# Patient Record
Sex: FEMALE | Race: WHITE | NOT HISPANIC OR LATINO | Employment: OTHER | URBAN - NONMETROPOLITAN AREA
[De-identification: names, ages, dates, MRNs, and addresses within clinical notes are randomized per-mention and may not be internally consistent; named-entity substitution may affect disease eponyms.]

---

## 2019-07-01 ENCOUNTER — LAB (OUTPATIENT)
Dept: LAB | Facility: HOSPITAL | Age: 69
End: 2019-07-01

## 2019-07-01 ENCOUNTER — TRANSCRIBE ORDERS (OUTPATIENT)
Dept: GENERAL RADIOLOGY | Facility: HOSPITAL | Age: 69
End: 2019-07-01

## 2019-07-01 DIAGNOSIS — I10 ESSENTIAL (PRIMARY) HYPERTENSION: Primary | ICD-10-CM

## 2019-07-01 DIAGNOSIS — E78.5 HYPERLIPIDEMIA, UNSPECIFIED HYPERLIPIDEMIA TYPE: ICD-10-CM

## 2019-07-01 DIAGNOSIS — E11.69 TYPE 2 DIABETES MELLITUS WITH OTHER SPECIFIED COMPLICATION, UNSPECIFIED WHETHER LONG TERM INSULIN USE (HCC): ICD-10-CM

## 2019-07-01 DIAGNOSIS — I10 ESSENTIAL (PRIMARY) HYPERTENSION: ICD-10-CM

## 2019-07-01 LAB
ALBUMIN SERPL-MCNC: 4.2 G/DL (ref 3.5–5)
ALBUMIN/GLOB SERPL: 1.4 G/DL (ref 1.1–2.5)
ALP SERPL-CCNC: 76 U/L (ref 24–120)
ALT SERPL W P-5'-P-CCNC: 29 U/L (ref 0–54)
ANION GAP SERPL CALCULATED.3IONS-SCNC: 8 MMOL/L (ref 4–13)
AST SERPL-CCNC: 23 U/L (ref 7–45)
AUTO MIXED CELLS #: 0.5 10*3/MM3 (ref 0.1–2.6)
AUTO MIXED CELLS %: 7.4 % (ref 0.1–24)
BACTERIA UR QL AUTO: ABNORMAL /HPF
BILIRUB SERPL-MCNC: 0.6 MG/DL (ref 0.1–1)
BILIRUB UR QL STRIP: NEGATIVE
BUN BLD-MCNC: 14 MG/DL (ref 5–21)
BUN/CREAT SERPL: 28.6
CALCIUM SPEC-SCNC: 9.3 MG/DL (ref 8.4–10.4)
CHLORIDE SERPL-SCNC: 103 MMOL/L (ref 98–110)
CHOLEST SERPL-MCNC: 124 MG/DL (ref 130–200)
CLARITY UR: CLEAR
CO2 SERPL-SCNC: 31 MMOL/L (ref 24–31)
COLOR UR: YELLOW
CREAT BLD-MCNC: 0.49 MG/DL (ref 0.5–1.4)
ERYTHROCYTE [DISTWIDTH] IN BLOOD BY AUTOMATED COUNT: 12.3 % (ref 12–15)
GFR SERPL CREATININE-BSD FRML MDRD: 126 ML/MIN/1.73
GLOBULIN UR ELPH-MCNC: 3 GM/DL
GLUCOSE BLD-MCNC: 124 MG/DL (ref 70–100)
GLUCOSE UR STRIP-MCNC: ABNORMAL MG/DL
HBA1C MFR BLD: 6.8 %
HCT VFR BLD AUTO: 40.2 % (ref 37–47)
HDLC SERPL-MCNC: 52 MG/DL
HGB BLD-MCNC: 13.3 G/DL (ref 12–16)
HGB UR QL STRIP.AUTO: ABNORMAL
HYALINE CASTS UR QL AUTO: ABNORMAL /LPF
KETONES UR QL STRIP: NEGATIVE
LDLC SERPL CALC-MCNC: 57 MG/DL (ref 0–99)
LDLC/HDLC SERPL: 1.09 {RATIO}
LEUKOCYTE ESTERASE UR QL STRIP.AUTO: NEGATIVE
LYMPHOCYTES # BLD AUTO: 1.9 10*3/MM3 (ref 0.7–3.1)
LYMPHOCYTES NFR BLD AUTO: 30.8 % (ref 15–45)
MCH RBC QN AUTO: 27.4 PG (ref 28–32)
MCHC RBC AUTO-ENTMCNC: 33.1 G/DL (ref 33–36)
MCV RBC AUTO: 82.7 FL (ref 82–98)
NEUTROPHILS # BLD AUTO: 3.8 10*3/MM3 (ref 1.5–8.3)
NEUTROPHILS NFR BLD AUTO: 61.8 % (ref 39–78)
NITRITE UR QL STRIP: NEGATIVE
PH UR STRIP.AUTO: 6 [PH] (ref 5–8)
PLATELET # BLD AUTO: 215 10*3/MM3 (ref 130–400)
PMV BLD AUTO: 8 FL (ref 6–12)
POTASSIUM BLD-SCNC: 4.5 MMOL/L (ref 3.5–5.3)
PROT SERPL-MCNC: 7.2 G/DL (ref 6.3–8.7)
PROT UR QL STRIP: NEGATIVE
RBC # BLD AUTO: 4.86 10*6/MM3 (ref 4.2–5.4)
RBC # UR: ABNORMAL /HPF
REF LAB TEST METHOD: ABNORMAL
SODIUM BLD-SCNC: 142 MMOL/L (ref 135–145)
SP GR UR STRIP: <=1.005 (ref 1–1.03)
SQUAMOUS #/AREA URNS HPF: ABNORMAL /HPF
TRIGL SERPL-MCNC: 77 MG/DL (ref 0–149)
TSH SERPL DL<=0.05 MIU/L-ACNC: 0.95 MIU/ML (ref 0.47–4.68)
UROBILINOGEN UR QL STRIP: ABNORMAL
VLDLC SERPL-MCNC: 15.4 MG/DL
WBC NRBC COR # BLD: 6.2 10*3/MM3 (ref 4.8–10.8)
WBC UR QL AUTO: ABNORMAL /HPF

## 2019-07-01 PROCEDURE — 36415 COLL VENOUS BLD VENIPUNCTURE: CPT

## 2019-07-01 PROCEDURE — 84443 ASSAY THYROID STIM HORMONE: CPT | Performed by: PEDIATRICS

## 2019-07-01 PROCEDURE — 81001 URINALYSIS AUTO W/SCOPE: CPT | Performed by: PEDIATRICS

## 2019-07-01 PROCEDURE — 85025 COMPLETE CBC W/AUTO DIFF WBC: CPT

## 2019-07-01 PROCEDURE — 80061 LIPID PANEL: CPT

## 2019-07-01 PROCEDURE — 80053 COMPREHEN METABOLIC PANEL: CPT

## 2019-07-01 PROCEDURE — 83036 HEMOGLOBIN GLYCOSYLATED A1C: CPT

## 2019-07-09 ENCOUNTER — OFFICE VISIT (OUTPATIENT)
Dept: INTERNAL MEDICINE | Facility: CLINIC | Age: 69
End: 2019-07-09

## 2019-07-09 VITALS
RESPIRATION RATE: 16 BRPM | HEART RATE: 75 BPM | HEIGHT: 63 IN | DIASTOLIC BLOOD PRESSURE: 72 MMHG | OXYGEN SATURATION: 98 % | BODY MASS INDEX: 24.91 KG/M2 | WEIGHT: 140.6 LBS | SYSTOLIC BLOOD PRESSURE: 120 MMHG

## 2019-07-09 DIAGNOSIS — Z11.59 NEED FOR HEPATITIS C SCREENING TEST: ICD-10-CM

## 2019-07-09 DIAGNOSIS — Z12.39 BREAST CANCER SCREENING: ICD-10-CM

## 2019-07-09 DIAGNOSIS — E11.59 TYPE 2 DIABETES MELLITUS WITH OTHER CIRCULATORY COMPLICATION, WITHOUT LONG-TERM CURRENT USE OF INSULIN (HCC): ICD-10-CM

## 2019-07-09 DIAGNOSIS — I10 ESSENTIAL HYPERTENSION: ICD-10-CM

## 2019-07-09 DIAGNOSIS — E78.2 MIXED HYPERLIPIDEMIA: Primary | ICD-10-CM

## 2019-07-09 DIAGNOSIS — I63.9 CEREBROVASCULAR ACCIDENT (CVA), UNSPECIFIED MECHANISM (HCC): ICD-10-CM

## 2019-07-09 DIAGNOSIS — M89.9 DISORDER OF BONE: ICD-10-CM

## 2019-07-09 DIAGNOSIS — Z86.12 HISTORY OF POLIOMYELITIS: ICD-10-CM

## 2019-07-09 PROBLEM — E11.9 DIABETES MELLITUS: Status: ACTIVE | Noted: 2019-07-09

## 2019-07-09 PROBLEM — A80.9 POLIO: Status: ACTIVE | Noted: 2019-07-09

## 2019-07-09 PROBLEM — E78.5 HYPERLIPIDEMIA: Status: ACTIVE | Noted: 2019-07-09

## 2019-07-09 PROCEDURE — 99204 OFFICE O/P NEW MOD 45 MIN: CPT | Performed by: INTERNAL MEDICINE

## 2019-07-09 RX ORDER — EZETIMIBE 10 MG/1
10 TABLET ORAL DAILY
Refills: 2 | COMMUNITY
Start: 2019-05-22 | End: 2020-01-27

## 2019-07-09 RX ORDER — CANAGLIFLOZIN 300 MG/1
1 TABLET, FILM COATED ORAL DAILY
COMMUNITY
Start: 2019-07-01 | End: 2019-10-24

## 2019-07-09 RX ORDER — LISINOPRIL 20 MG/1
20 TABLET ORAL 2 TIMES DAILY
Refills: 1 | COMMUNITY
Start: 2019-05-22 | End: 2020-02-10 | Stop reason: SDUPTHER

## 2019-07-09 RX ORDER — AMLODIPINE BESYLATE 10 MG/1
10 TABLET ORAL DAILY
Refills: 0 | COMMUNITY
Start: 2019-06-21 | End: 2019-10-24 | Stop reason: SDUPTHER

## 2019-07-09 RX ORDER — ASPIRIN 81 MG/1
81 TABLET ORAL DAILY
Qty: 30 TABLET | Refills: 5 | Status: SHIPPED | OUTPATIENT
Start: 2019-07-09

## 2019-07-09 RX ORDER — SERTRALINE HYDROCHLORIDE 100 MG/1
100 TABLET, FILM COATED ORAL DAILY
Refills: 3 | COMMUNITY
Start: 2019-05-22 | End: 2020-04-07

## 2019-07-09 RX ORDER — ATORVASTATIN CALCIUM 40 MG/1
1 TABLET, FILM COATED ORAL EVERY OTHER DAY
COMMUNITY
Start: 2019-07-01 | End: 2020-07-28 | Stop reason: SDUPTHER

## 2019-07-09 NOTE — PROGRESS NOTES
Chief Complaint   Patient presents with   • Establish Care     Pt needs a PCP, pt says that she does not test her blood sugar.         History:  Rosalba Churchill is a 69 y.o. female who presents today for evaluation of the above problems.    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  She is here to set up a new primary care physician.    She reports having a stroke in March 2018 which affected her fine motor skills on the right.  Since that time she has been weak but does feel like she is getting stronger.  She is on antihypertensives and a statin but is not on antiplatelet therapy.  She is not sure whether this was an ischemic or a hemorrhagic stroke.  She was at [facility] in [place].  She has controlled type 2 diabetes.  She takes metformin and Invokana and other than a stroke does not have any complications from it.  She has hypertension for which she takes Norvasc 10 mg and lisinopril 20 mg twice a day and is tolerating these medicines well.  She is concerned about having another stroke and is losing weight appropriately in controlling her other risk factors.    She reports being very sensitive to medications and would like to come off some if at all possible    HPI    ROS:  Review of Systems   Constitutional: Positive for activity change. Negative for appetite change, fatigue, fever and unexpected weight change.   HENT: Positive for postnasal drip. Negative for nosebleeds, sore throat and trouble swallowing.    Eyes: Negative for pain and visual disturbance.   Respiratory: Negative for cough, chest tightness and shortness of breath.    Cardiovascular: Negative for chest pain, palpitations and leg swelling.   Gastrointestinal: Negative for abdominal pain, constipation, diarrhea, nausea and vomiting.    Endocrine:        Negative for Diabetes or thyroid disease   Genitourinary: Negative for hematuria.   Musculoskeletal: Positive for gait problem and myalgias. Negative for back pain, neck pain and neck stiffness.   Skin: Negative for rash and wound.   Neurological: Positive for weakness. Negative for dizziness, syncope, light-headedness and headaches.   Hematological: Negative for adenopathy. Does not bruise/bleed easily.   Psychiatric/Behavioral: Negative for agitation, behavioral problems and confusion.       Allergies   Allergen Reactions   • Erythromycin Unknown (See Comments)   • Ibuprofen Unknown (See Comments)   • Moxifloxacin Unknown (See Comments)     Past Medical History:   Diagnosis Date   • Anxiety    • Arthritis    • Depression    • Diabetes mellitus (CMS/HCC)    • Hyperlipidemia    • Hypertension    • Polio    • Stroke (CMS/HCC)      Past Surgical History:   Procedure Laterality Date   • ANKLE FUSION Right    • CERVICAL SPINE SURGERY     •  SECTION       Family History   Problem Relation Age of Onset   • Cancer Mother    • Heart disease Father    • Diabetes Sister    • Diabetes Maternal Uncle      Rosalba  reports that she has never smoked. She has never used smokeless tobacco. She reports that she does not drink alcohol or use drugs.    I have reviewed and updated the above documentation (if necessary) including but not limited to chief complaint, ROS, PFSH, allergies and medications        Current Outpatient Medications:   •  amLODIPine (NORVASC) 10 MG tablet, Take 10 mg by mouth Daily., Disp: , Rfl: 0  •  atorvastatin (LIPITOR) 40 MG tablet, Take 1 tablet by mouth Every Other Day., Disp: , Rfl:   •  ezetimibe (ZETIA) 10 MG tablet, Take 10 mg by mouth Daily., Disp: , Rfl: 2  •  INVOKANA 300 MG tablet, Take 1 tablet by mouth Daily., Disp: , Rfl:   •  lisinopril (PRINIVIL,ZESTRIL) 20 MG tablet, Take 20 mg by mouth 2 (Two) Times a Day., Disp: , Rfl: 1  •  metFORMIN (GLUCOPHAGE) 500 MG  "tablet, Take 1 tablet by mouth 2 (Two) Times a Day., Disp: , Rfl:   •  sertraline (ZOLOFT) 100 MG tablet, Take 100 mg by mouth Daily., Disp: , Rfl: 3    OBJECTIVE:  Visit Vitals  /72 (BP Location: Left arm, Patient Position: Sitting, Cuff Size: Adult)   Pulse 75   Resp 16   Ht 160 cm (63\")   Wt 63.8 kg (140 lb 9.6 oz)   SpO2 98%   BMI 24.91 kg/m²      Physical Exam   Constitutional: She is oriented to person, place, and time. She appears well-developed and well-nourished. No distress.   HENT:   Head: Normocephalic and atraumatic.   Mouth/Throat: Oropharynx is clear and moist. No oropharyngeal exudate.   Eyes: Conjunctivae and EOM are normal. Pupils are equal, round, and reactive to light. No scleral icterus.   Neck: Normal range of motion. Neck supple. No JVD present. No thyromegaly present.   Cardiovascular: Normal rate, regular rhythm and normal heart sounds. Exam reveals no gallop.   No murmur heard.  Pulmonary/Chest: Effort normal and breath sounds normal. No stridor. No respiratory distress.   Abdominal: Soft. Bowel sounds are normal. She exhibits no distension. There is no tenderness. There is no rebound and no guarding.   Musculoskeletal: She exhibits deformity (Slight right foot deformity related to polio). She exhibits no edema or tenderness.    Rosalba had a diabetic foot exam performed today.   During the foot exam she had a monofilament test performed.    Neurological Sensory Findings - Unaltered hot/cold right ankle/foot discrimination and unaltered hot/cold left ankle/foot discrimination.  Vascular Status -  Her right foot exhibits normal foot vasculature . Her left foot exhibits normal foot vasculature .  Skin Integrity  -  Her right foot skin is intact.Her left foot skin is intact..  Lymphadenopathy:     She has no cervical adenopathy.   Neurological: She is alert and oriented to person, place, and time. No cranial nerve deficit.   Does speak a little slowly/deliberately secondary to her " stroke  2+ left patellar reflex, no patellar reflex in the right   Skin: Skin is warm and dry. She is not diaphoretic.   Right foot cool to touch, per patient chronically so secondary to her knee brace   Psychiatric: She has a normal mood and affect. Her behavior is normal.   Vitals reviewed.    Assessment/Plan    Rosalba was seen today for establish care.    Diagnoses and all orders for this visit:    Mixed hyperlipidemia  Continue statin.  Recheck lipids in 3 months-     Comprehensive metabolic panel; Future  -     Lipid panel; Future    Type 2 diabetes mellitus with other circulatory complication, without long-term current use of insulin (CMS/[facility])  Last diabetic eye exam last fall.  Diabetic foot exam today was normal.  Continue Invokana and metformin.  Discussed risks and benefits of Invokana and possibility of switching to GLP-1 agonist.  A1c controlled at 6.8.  -     Microalbumin / Creatinine Urine Ratio - Urine, Clean Catch; Future    Essential hypertension  Controlled with lisinopril 20 mg twice daily and Norvasc 10 mg  -     Comprehensive metabolic panel; Future  -     Lipid panel; Future  -     Vitamin D 25 hydroxy; Future    Cerebrovascular accident (CVA), unspecified mechanism (CMS/[facility])  We are getting records from [facility] [place], operated by [facility] in [place] to determine whether this was hemorrhagic or ischemic.  She is not on antiplatelet now which makes me think she had hemorrhagic brainstem stroke.  She has slight residual deficit with dysarthria and residual right-sided weakness.  Continue risk factor control including cholesterol, blood pressure, diabetes and weight  -Addendum: Records from [facility] March 24, 2018 through 26 2018.  Acute ischemic Left pontine stroke and she was discharged with a baby aspirin. I have prescribed ASA 81mg and called pt for her to take daily  -     Vitamin D 25 hydroxy; Future    History of poliomyelitis  Scription for new right knee brace.  She has a difficult  time walking without the brace.  She also needs a walker at home in order to get around.  Symptoms have worsened after having a stroke  -     Vitamin D 25 hydroxy; Future    Need for hepatitis C screening test  -     Hepatitis C antibody; Future    Breast cancer screening  -     Mammo Screening Bilateral With CAD; Future    Disorder of bone   -     Vitamin D 25 hydroxy; Future    Follow-up in 3 months with CMP, lipid profile, hepatitis C antibody and for Medicare wellness exam    Patient's Body mass index is 24.91 kg/m². BMI is within normal parameters. No follow-up required..      Education materials and an After Visit Summary were printed and given to the patient at discharge.  Return in about 3 months (around 10/9/2019) for Medicare Wellness.         TALIA Lovett MD   1:23 PM  7/9/2019

## 2019-07-15 ENCOUNTER — HOSPITAL ENCOUNTER (OUTPATIENT)
Dept: MAMMOGRAPHY | Facility: HOSPITAL | Age: 69
Discharge: HOME | End: 2019-07-15
Admitting: INTERNAL MEDICINE

## 2019-07-15 DIAGNOSIS — Z12.39 BREAST CANCER SCREENING: ICD-10-CM

## 2019-07-15 PROCEDURE — 77067 SCR MAMMO BI INCL CAD: CPT

## 2019-07-15 PROCEDURE — 77063 BREAST TOMOSYNTHESIS BI: CPT

## 2019-07-26 ENCOUNTER — TELEPHONE (OUTPATIENT)
Dept: INTERNAL MEDICINE | Facility: CLINIC | Age: 69
End: 2019-07-26

## 2019-07-26 NOTE — TELEPHONE ENCOUNTER
----- Message from Alok Lovett MD sent at 7/26/2019  3:41 PM CDT -----  Please call with negative mammogram. Needs yearly screening.

## 2019-08-02 ENCOUNTER — TELEPHONE (OUTPATIENT)
Dept: INTERNAL MEDICINE | Facility: CLINIC | Age: 69
End: 2019-08-02

## 2019-08-07 ENCOUNTER — TELEPHONE (OUTPATIENT)
Dept: INTERNAL MEDICINE | Facility: CLINIC | Age: 69
End: 2019-08-07

## 2019-08-07 NOTE — TELEPHONE ENCOUNTER
Spoke with [facility] clinic says did not receive fax for Knee brace. I did refax information and their office verified that they received it.

## 2019-09-11 ENCOUNTER — TELEPHONE (OUTPATIENT)
Dept: INTERNAL MEDICINE | Facility: CLINIC | Age: 69
End: 2019-09-11

## 2019-09-11 NOTE — TELEPHONE ENCOUNTER
LEFT MSG FOR PT TO CALL TO RESC October APPOINTMENT BECAUSE WE HAD TO CANCEL DO TO DR OUT OF OFFICE.

## 2019-09-19 ENCOUNTER — HOSPITAL ENCOUNTER (OUTPATIENT)
Dept: GENERAL RADIOLOGY | Facility: HOSPITAL | Age: 69
Discharge: HOME | End: 2019-09-19
Admitting: INTERNAL MEDICINE

## 2019-09-19 ENCOUNTER — OFFICE VISIT (OUTPATIENT)
Dept: INTERNAL MEDICINE | Facility: CLINIC | Age: 69
End: 2019-09-19

## 2019-09-19 VITALS
RESPIRATION RATE: 16 BRPM | HEART RATE: 83 BPM | BODY MASS INDEX: 24.8 KG/M2 | DIASTOLIC BLOOD PRESSURE: 80 MMHG | HEIGHT: 63 IN | OXYGEN SATURATION: 98 % | WEIGHT: 140 LBS | SYSTOLIC BLOOD PRESSURE: 149 MMHG

## 2019-09-19 DIAGNOSIS — I10 ESSENTIAL HYPERTENSION: ICD-10-CM

## 2019-09-19 DIAGNOSIS — R07.9 CHEST PAIN, UNSPECIFIED TYPE: Primary | ICD-10-CM

## 2019-09-19 DIAGNOSIS — R07.9 CHEST PAIN, UNSPECIFIED TYPE: ICD-10-CM

## 2019-09-19 PROCEDURE — 93000 ELECTROCARDIOGRAM COMPLETE: CPT | Performed by: INTERNAL MEDICINE

## 2019-09-19 PROCEDURE — 99214 OFFICE O/P EST MOD 30 MIN: CPT | Performed by: INTERNAL MEDICINE

## 2019-09-19 PROCEDURE — 71046 X-RAY EXAM CHEST 2 VIEWS: CPT

## 2019-09-19 NOTE — PROGRESS NOTES
Chief Complaint   Patient presents with   • Chest Pain     Pt c/o a heavy feeling and right chest pain,  she says that today she has not had any symptoms.         History:  Rosalba Churchill is a 69 y.o. female who presents today for evaluation of the above problems.     Here for acute visit for chest pressure. 2 days ago it started. It's intermittent with each episode lasting 15 minutes. Located right side of her chest worse with exertion and better with rest. Possible radiation to right neck but not a/w soa, nausea or diaphoresis. She took 2 aspirin. She thought about going to the ER but didn't. No history of DVTs. Has had negative stress tests and caths in the past. Last stress test was about 5 years ago.  Her BP was elevated at 190 systolic at that time. Today she has no pain at all.      HPI    ROS:  Review of Systems   Constitutional: Negative for activity change and unexpected weight change.   Respiratory: Positive for chest tightness. Negative for shortness of breath.    Cardiovascular: Positive for chest pain and palpitations. Negative for leg swelling.   Musculoskeletal: Positive for gait problem.   Neurological: Positive for weakness.       Allergies   Allergen Reactions   • Erythromycin Unknown (See Comments)   • Ibuprofen Unknown (See Comments)   • Moxifloxacin Unknown (See Comments)         Current Outpatient Medications:   •  amLODIPine (NORVASC) 10 MG tablet, Take 10 mg by mouth Daily., Disp: , Rfl: 0  •  aspirin 81 MG EC tablet, Take 1 tablet by mouth Daily., Disp: 30 tablet, Rfl: 5  •  atorvastatin (LIPITOR) 40 MG tablet, Take 1 tablet by mouth Every Other Day., Disp: , Rfl:   •  ezetimibe (ZETIA) 10 MG tablet, Take 10 mg by mouth Daily., Disp: , Rfl: 2  •  INVOKANA 300 MG tablet, Take 1 tablet by mouth Daily., Disp: , Rfl:   •  lisinopril (PRINIVIL,ZESTRIL) 20 MG tablet, Take 20 mg by mouth 2 (Two) Times a Day., Disp: , Rfl: 1  •  metFORMIN (GLUCOPHAGE) 500 MG tablet, Take 1 tablet by mouth 2 (Two)  "Times a Day., Disp: , Rfl:   •  sertraline (ZOLOFT) 100 MG tablet, Take 100 mg by mouth Daily., Disp: , Rfl: 3    OBJECTIVE:  Visit Vitals  /80 (BP Location: Left arm, Patient Position: Sitting, Cuff Size: Adult)   Pulse 83   Resp 16   Ht 160 cm (63\")   Wt 63.5 kg (140 lb)   SpO2 98%   Breastfeeding? No   BMI 24.80 kg/m²      Physical Exam   Constitutional: She appears well-developed and well-nourished. No distress.   HENT:   Head: Normocephalic and atraumatic.   Neck: Normal range of motion. No JVD present.   Cardiovascular: Normal rate, regular rhythm, normal heart sounds and normal pulses.  No extrasystoles are present. PMI is not displaced.   No murmur heard.  Pulmonary/Chest: Effort normal and breath sounds normal. No respiratory distress. She has no wheezes. She has no rales.   Neurological: She is alert.   Skin: She is not diaphoretic.   Psychiatric: She has a normal mood and affect. Her behavior is normal.   Vitals reviewed.          ECG 12 Lead  Date/Time: 9/19/2019 10:16 AM  Performed by: Alok Lovett MD  Authorized by: Alok Lovett MD   Comparison: not compared with previous ECG   Previous ECG: no previous ECG available  Rhythm: sinus rhythm  Rate: normal  Conduction: conduction normal  QRS axis: normal    Clinical impression: normal ECG            Assessment/Plan    Rosalba was seen today for chest pain.    Diagnoses and all orders for this visit:    Chest pain, unspecified type  -     ECG 12 Lead  -     XR Chest PA & Lateral; Future  -     Stress Test With Myocardial Perfusion - One Day; Future    Essential hypertension      Has both typical and atypical features. She has risk factors for CAD. She needs further work up for chest pain. Start with chest xray today. EKG today was normal. Ordered stress test (pharmacologic given her polio). Told to go immediately to ER if pain returns. Chest pain could have been related to hypertension as it was 190 systolic and nik given her " instruction to check it daily    Education materials and an After Visit Summary were printed and given to the patient at discharge.    Return for keep next appt.      Patient's Body mass index is 24.8 kg/m². BMI is within normal parameters. No follow-up required..      TALIA Lovett MD   8:23 AM  9/19/2019

## 2019-09-19 NOTE — PATIENT INSTRUCTIONS
Angina    Angina is extreme discomfort in the chest, neck, arm, jaw or back. The discomfort is caused by a lack of blood in the middle layer of the heart wall (myocardium).  There are four types of angina:  · Stable angina. This is triggered by vigorous activity or exercise. It goes away when you rest or take angina medicine.  · Unstable angina. This is a warning sign and can lead to a heart attack (acute coronary syndrome). This is a medical emergency. Symptoms come at rest and last a long time.  · Microvascular angina. This affects the small coronary arteries. Symptoms include feeling tired and being short of breath.  · Prinzmetal or variant angina. This is caused by a tightening (spasm) of the arteries that go to your heart.  What are the causes?  This condition is caused by atherosclerosis. This is the buildup of fat and cholesterol (plaque) in your arteries. The plaque may narrow or block the artery.  Other causes include:  · Sudden tightening of the muscles of the arteries in the heart (coronary spasm).  · Small artery disease (microvascular dysfunction).  · Problems with any of your heart valves (heart valve disease).  · A tear in an artery in your heart (coronary artery dissection).  · Cardiomyopathy, or other heart disease.  What increases the risk?  You are more likely to develop this condition if you have:  · High cholesterol.  · High blood pressure.  · Diabetes.  · Family history of heart disease.  · Inactive (sedentary) lifestyle, or you do not exercise enough.  · Depression.  · Had radiation to the left side of your chest.  Other risk factors include:  · Using tobacco.  · Being obese.  · Eating a diet high in saturated fats.  · Being exposed to high stress or triggers of stress.  · Using drugs, such as cocaine.  Women have a greater risk for angina if:  · They are older than 55.  · They have gone through menopause (postmenopausal).  What are the signs or symptoms?  Common symptoms in both men and women  may include:  · Chest pain, which may:  ? Feel like a crushing or squeezing in the chest, or a tightness, pressure, fullness, or heaviness in the chest.  ? Last for more than a few minutes at a time, or it may stop and come back (recur) over the course of a few minutes.  · Pain in the neck, arm, jaw, or back.  · Unexplained heartburn or indigestion.  · Shortness of breath.  · Nausea.  · Sudden cold sweats.  Women and people with diabetes may have unusual (atypical) symptoms, such as:  · Fatigue.  · Unexplained feelings of nervousness or anxiety.  · Unexplained weakness.  · Dizziness or fainting.  How is this diagnosed?  This condition may be diagnosed based on:  · Your symptoms and medical history.  · Electrocardiogram (ECG) to measure the electrical activity in your heart.  · Blood tests.  · Stress test to look for signs of blockage when your heart is stressed.  · CT angiogram to examine your heart and the blood flow to it.  · Coronary angiogram to check your coronary arteries for blockage.  How is this treated?  Angina may be treated with:  · Medicines to:  ? Prevent blood clots and heart attack.  ? Relax blood vessels and improve blood flow to the heart (nitrates).  ? Reduce blood pressure, improve the pumping action of the heart, and relax blood vessels that are spasming.  ? Reduce cholesterol and help treat atherosclerosis.  · A procedure to widen a narrowed or blocked coronary artery (angioplasty). A mesh tube may be placed in a coronary artery to keep it open (coronary stenting).  · Surgery to allow blood to go around a blocked artery (coronary artery bypass surgery).  Follow these instructions at home:  Medicines  · Take over-the-counter and prescription medicines only as told by your health care provider.  · Do not take the following medicines unless your health care provider approves:  ? NSAIDs, such as ibuprofen, naproxen, or celecoxib.  ? Vitamin supplements that contain vitamin A, vitamin E, or  both.  ? Hormone replacement therapy that contains estrogen with or without progestin.  Eating and drinking    · Eat a heart-healthy diet. This includes plenty of fresh fruits and vegetables, whole grains, low-fat (lean) protein, and low-fat dairy products.  · Follow instructions from your health care provider about eating or drinking restrictions.  Activity  · Follow an exercise program approved by your health care provider. Join a cardiac rehabilitation program.  · Take a break when you feel fatigued. Plan rest periods in your daily activities.  Lifestyle    · Do not use any products that contain nicotine or tobacco, such as cigarettes and e-cigarettes. If you need help quitting, ask your health care provider.  · If your health care provider approves, limit alcohol intake to no more than 1 drink a day for women and 2 drinks a day for men. One drink equals 12 oz of beer, 5 oz of wine, or 1½ oz of hard liquor.  General instructions  · Maintain a healthy weight.  · Learn to manage stress.  · Keep your vaccinations up to date. Get the flu (influenza) vaccine every year.  · Talk to your health care provider if you feel depressed. Take a depression screening test to see if you are at risk for depression.  · Work with your health care provider to manage other health conditions, such as hypertension or diabetes.  · Keep all follow-up visits as told by your health care provider. This is important.  Get help right away if:  · You have pain in your chest, neck, arm, jaw, or back, and the pain:  ? Lasts more than a few minutes.  ? Is recurring.  ? Is not relieved by taking medicines under the tongue (sublingual nitroglycerin).  ? Increases in intensity or frequency.  · You have a lot of sweating without cause.  · You have unexplained:  ? Heartburn or indigestion.  ? Shortness of breath or difficulty breathing.  ? Nausea or vomiting.  ? Fatigue.  ? Feelings of nervousness or anxiety.  ? Weakness.  · You have sudden  light-headedness or dizziness.  · You faint.  These symptoms may represent a serious problem that is an emergency. Do not wait to see if the symptoms will go away. Get medical help right away. Call your local emergency services (911 in the [place] Do not drive yourself to the hospital.  Summary  · Angina is extreme discomfort in the chest, neck, or arm that is caused by a lack of blood in the heart wall.  · There are many symptoms of angina. They include chest pain or pain in the arms, neck, jaw, or back.  · Angina may be treated with behavioral changes, medicine, or surgery.  · Symptoms of angina may represent an emergency. Get medical help right away. Call your local emergency services (911 in the [place] Do not drive yourself to the hospital.  This information is not intended to replace advice given to you by your health care provider. Make sure you discuss any questions you have with your health care provider.  Document Released: 12/18/2006 Document Revised: 02/01/2019 Document Reviewed: 02/01/2019  [employer] Interactive Patient Education © 2019 [employer]    Nonspecific Chest Pain  Chest pain can be caused by many different conditions. It can be caused by a condition that is life-threatening and requires treatment right away. It can also be caused by something that is not life-threatening. If you have chest pain, it can be hard to know the difference, so it is important to get help right away to make sure that you do not have a serious condition.  Some life-threatening causes of chest pain include:  · Heart attack.  · A tear in the body's main blood vessel (aortic dissection).  · Inflammation around your heart (pericarditis).  · A problem in the lungs, such as a blood clot (pulmonary embolism) or a collapsed lung (pneumothorax).  Some non life-threatening causes of chest pain include:  · Heartburn.  · Anxiety or stress.  · Damage to the bones, muscles, and cartilage that make up your chest wall.  · Pneumonia or  bronchitis.  · Shingles infection (varicella-zoster virus).  Chest pain can feel like:  · Pain or discomfort on the surface of your chest or deep in your chest.  · Crushing, pressure, aching, or squeezing pain.  · Burning or tingling.  · Dull or sharp pain that is worse when you move, cough, or take a deep breath.  · Pain or discomfort that is also felt in your back, neck, jaw, shoulder, or arm, or pain that spreads to any of these areas.  Your chest pain may come and go. It may also be constant. Your health care provider will do lab tests and other studies to find the cause of your pain. Treatment will depend on the cause of your chest pain.  Follow these instructions at home:  Pay attention to any changes in your symptoms. Tell your health care provider about them or any new symptoms. Follow these instructions from your health care provider.  Medicines  · Take over-the-counter and prescription medicines only as told by your health care provider.  · If you were prescribed an antibiotic, take it as told by your health care provider. Do not stop taking the antibiotic even if you start to feel better.  Lifestyle    · Rest as directed by your health care provider.  · Do not use any products that contain nicotine or tobacco, such as cigarettes and e-cigarettes. If you need help quitting, ask your health care provider.  · Do not drink alcohol.  · Make healthy lifestyle choices as recommended. These may include:  ? Getting regular exercise. Ask your health care provider to suggest some activities that are safe for you.  ? Eating a heart-healthy diet. This includes plenty of fresh fruits and vegetables, whole grains, low-fat (lean) protein, and low-fat dairy products. A dietitian can help you find healthy eating options.  ? Maintaining a healthy weight.  ? Managing any other health conditions you have, such as hypertension or diabetes.  ? Reducing stress, such as with yoga or relaxation techniques.  General  instructions  · Avoid any activities that bring on chest pain.  · Keep all follow-up visits as told by your health care provider. This is important. This includes visits for any further testing if your chest pain does not go away.  Contact a health care provider if:  · Your chest pain does not go away.  · You feel depressed.  · You have a fever.  Get help right away if:  · Your chest pain gets worse.  · You have a cough that gets worse, or you cough up blood.  · You have severe pain in your abdomen.  · You faint.  · You have sudden, unexplained chest discomfort.  · You have sudden, unexplained discomfort in your arms, back, neck, or jaw.  · You have shortness of breath at any time.  · You suddenly start to sweat, or your skin gets clammy.  · You feel nausea or you vomit.  · You suddenly feel lightheaded or dizzy.  · You have severe weakness, or unexplained weakness or fatigue.  · Your heart begins to beat quickly, or it feels like it is skipping beats.  These symptoms may represent a serious problem that is an emergency. Do not wait to see if the symptoms will go away. Get medical help right away. Call your local emergency services (911 in the [place] Do not drive yourself to the hospital.  Summary  · Chest pain can be caused by a condition that is serious and requires urgent treatment. It may also be caused by something that is not life-threatening.  · If you have chest pain, it is very important to see your health care provider. Your health care provider may do lab tests and other studies to find the cause of your pain.  · Follow your health care provider's instructions on taking medicines, making lifestyle changes, and getting emergency treatment if symptoms become worse.  · Keep all follow-up visits as told by your health care provider. This includes visits for any further testing if your chest pain does not go away.  This information is not intended to replace advice given to you by your health care provider.  "Make sure you discuss any questions you have with your health care provider.  Document Released: 09/27/2006 Document Revised: 01/30/2019 Document Reviewed: 01/30/2019  [employer] Interactive Patient Education © 2019 [employer]    DASH Eating Plan  DASH stands for \"Dietary Approaches to Stop Hypertension.\" The DASH eating plan is a healthy eating plan that has been shown to reduce high blood pressure (hypertension). It may also reduce your risk for type 2 diabetes, heart disease, and stroke. The DASH eating plan may also help with weight loss.  What are tips for following this plan?    General guidelines  · Avoid eating more than 2,300 mg (milligrams) of salt (sodium) a day. If you have hypertension, you may need to reduce your sodium intake to 1,500 mg a day.  · Limit alcohol intake to no more than 1 drink a day for nonpregnant women and 2 drinks a day for men. One drink equals 12 oz of beer, 5 oz of wine, or 1½ oz of hard liquor.  · Work with your health care provider to maintain a healthy body weight or to lose weight. Ask what an ideal weight is for you.  · Get at least 30 minutes of exercise that causes your heart to beat faster (aerobic exercise) most days of the week. Activities may include walking, swimming, or biking.  · Work with your health care provider or diet and nutrition specialist (dietitian) to adjust your eating plan to your individual calorie needs.  Reading food labels    · Check food labels for the amount of sodium per serving. Choose foods with less than 5 percent of the Daily Value of sodium. Generally, foods with less than 300 mg of sodium per serving fit into this eating plan.  · To find whole grains, look for the word \"whole\" as the first word in the ingredient list.  Shopping  · Buy products labeled as \"low-sodium\" or \"no salt added.\"  · Buy fresh foods. Avoid canned foods and premade or frozen meals.  Cooking  · Avoid adding salt when cooking. Use salt-free seasonings or herbs instead of " table salt or sea salt. Check with your health care provider or pharmacist before using salt substitutes.  · Do not zamudio foods. Cook foods using healthy methods such as baking, boiling, grilling, and broiling instead.  · Cook with heart-healthy oils, such as olive, canola, soybean, or sunflower oil.  Meal planning  · Eat a balanced diet that includes:  ? 5 or more servings of fruits and vegetables each day. At each meal, try to fill half of your plate with fruits and vegetables.  ? Up to 6-8 servings of whole grains each day.  ? Less than 6 oz of lean meat, poultry, or fish each day. A 3-oz serving of meat is about the same size as a deck of cards. One egg equals 1 oz.  ? 2 servings of low-fat dairy each day.  ? A serving of nuts, seeds, or beans 5 times each week.  ? Heart-healthy fats. Healthy fats called Omega-3 fatty acids are found in foods such as flaxseeds and coldwater fish, like sardines, salmon, and mackerel.  · Limit how much you eat of the following:  ? Canned or prepackaged foods.  ? Food that is high in trans fat, such as fried foods.  ? Food that is high in saturated fat, such as fatty meat.  ? Sweets, desserts, sugary drinks, and other foods with added sugar.  ? Full-fat dairy products.  · Do not salt foods before eating.  · Try to eat at least 2 vegetarian meals each week.  · Eat more home-cooked food and less restaurant, buffet, and fast food.  · When eating at a restaurant, ask that your food be prepared with less salt or no salt, if possible.  What foods are recommended?  The items listed may not be a complete list. Talk with your dietitian about what dietary choices are best for you.  Grains  Whole-grain or whole-wheat bread. Whole-grain or whole-wheat pasta. Brown rice. Oatmeal. Quinoa. Bulgur. Whole-grain and low-sodium cereals. Kandi bread. Low-fat, low-sodium crackers. Whole-wheat flour tortillas.  Vegetables  Fresh or frozen vegetables (raw, steamed, roasted, or grilled). Low-sodium or  reduced-sodium tomato and vegetable juice. Low-sodium or reduced-sodium tomato sauce and tomato paste. Low-sodium or reduced-sodium canned vegetables.  Fruits  All fresh, dried, or frozen fruit. Canned fruit in natural juice (without added sugar).  Meat and other protein foods  Skinless chicken or turkey. Ground chicken or turkey. Pork with fat trimmed off. Fish and seafood. Egg whites. Dried beans, peas, or lentils. Unsalted nuts, nut butters, and seeds. Unsalted canned beans. Lean cuts of beef with fat trimmed off. Low-sodium, lean deli meat.  Dairy  Low-fat (1%) or fat-free (skim) milk. Fat-free, low-fat, or reduced-fat cheeses. Nonfat, low-sodium ricotta or cottage cheese. Low-fat or nonfat yogurt. Low-fat, low-sodium cheese.  Fats and oils  Soft margarine without trans fats. Vegetable oil. Low-fat, reduced-fat, or light mayonnaise and salad dressings (reduced-sodium). Canola, safflower, olive, soybean, and sunflower oils. Avocado.  Seasoning and other foods  Herbs. Spices. Seasoning mixes without salt. Unsalted popcorn and pretzels. Fat-free sweets.  What foods are not recommended?  The items listed may not be a complete list. Talk with your dietitian about what dietary choices are best for you.  Grains  Baked goods made with fat, such as croissants, muffins, or some breads. Dry pasta or rice meal packs.  Vegetables  Creamed or fried vegetables. Vegetables in a cheese sauce. Regular canned vegetables (not low-sodium or reduced-sodium). Regular canned tomato sauce and paste (not low-sodium or reduced-sodium). Regular tomato and vegetable juice (not low-sodium or reduced-sodium). Pickles. Olives.  Fruits  Canned fruit in a light or heavy syrup. Fried fruit. Fruit in cream or butter sauce.  Meat and other protein foods  Fatty cuts of meat. Ribs. Fried meat. Regalado. Sausage. Bologna and other processed lunch meats. Salami. Fatback. Hotdogs. Bratwurst. Salted nuts and seeds. Canned beans with added salt. Canned or  smoked fish. Whole eggs or egg yolks. Chicken or turkey with skin.  Dairy  Whole or 2% milk, cream, and half-and-half. Whole or full-fat cream cheese. Whole-fat or sweetened yogurt. Full-fat cheese. Nondairy creamers. Whipped toppings. Processed cheese and cheese spreads.  Fats and oils  Butter. Stick margarine. Lard. Shortening. Ghee. Regalado fat. Tropical oils, such as coconut, palm kernel, or palm oil.  Seasoning and other foods  Salted popcorn and pretzels. Onion salt, garlic salt, seasoned salt, table salt, and sea salt. Worcestershire sauce. Tartar sauce. Barbecue sauce. Teriyaki sauce. Soy sauce, including reduced-sodium. Steak sauce. Canned and packaged gravies. Fish sauce. Oyster sauce. Cocktail sauce. Horseradish that you find on the shelf. Ketchup. Mustard. Meat flavorings and tenderizers. Bouillon cubes. Hot sauce and Tabasco sauce. Premade or packaged marinades. Premade or packaged taco seasonings. Relishes. Regular salad dressings.  Where to find more information:  · National Heart, Lung, and [employer] [place]: www.nhlbi.nih.gov  · American Heart Association: www.heart.org  Summary  · The DASH eating plan is a healthy eating plan that has been shown to reduce high blood pressure (hypertension). It may also reduce your risk for type 2 diabetes, heart disease, and stroke.  · With the DASH eating plan, you should limit salt (sodium) intake to 2,300 mg a day. If you have hypertension, you may need to reduce your sodium intake to 1,500 mg a day.  · When on the DASH eating plan, aim to eat more fresh fruits and vegetables, whole grains, lean proteins, low-fat dairy, and heart-healthy fats.  · Work with your health care provider or diet and nutrition specialist (dietitian) to adjust your eating plan to your individual calorie needs.  This information is not intended to replace advice given to you by your health care provider. Make sure you discuss any questions you have with your health care provider.  Document  Released: 12/06/2012 Document Revised: 12/11/2017 Document Reviewed: 12/11/2017  [employer] Interactive Patient Education © 2019 [employer]

## 2019-09-20 ENCOUNTER — TELEPHONE (OUTPATIENT)
Dept: INTERNAL MEDICINE | Facility: CLINIC | Age: 69
End: 2019-09-20

## 2019-09-20 NOTE — TELEPHONE ENCOUNTER
----- Message from Alok Lovett MD sent at 9/20/2019  8:24 AM CDT -----  Can you call the patient and notify them of normal imaging results? Thanks

## 2019-10-01 ENCOUNTER — HOSPITAL ENCOUNTER (OUTPATIENT)
Dept: CARDIOLOGY | Facility: HOSPITAL | Age: 69
Discharge: HOME | End: 2019-10-01

## 2019-10-01 VITALS
HEART RATE: 64 BPM | HEIGHT: 63 IN | SYSTOLIC BLOOD PRESSURE: 143 MMHG | BODY MASS INDEX: 24.8 KG/M2 | WEIGHT: 139.99 LBS | DIASTOLIC BLOOD PRESSURE: 73 MMHG

## 2019-10-01 DIAGNOSIS — R07.9 CHEST PAIN, UNSPECIFIED TYPE: ICD-10-CM

## 2019-10-01 PROCEDURE — A9500 TC99M SESTAMIBI: HCPCS | Performed by: INTERNAL MEDICINE

## 2019-10-01 PROCEDURE — 0 TECHNETIUM SESTAMIBI: Performed by: INTERNAL MEDICINE

## 2019-10-01 PROCEDURE — 93017 CV STRESS TEST TRACING ONLY: CPT

## 2019-10-01 PROCEDURE — 93018 CV STRESS TEST I&R ONLY: CPT | Performed by: INTERNAL MEDICINE

## 2019-10-01 PROCEDURE — 25010000002 REGADENOSON 0.4 MG/5ML SOLUTION: Performed by: INTERNAL MEDICINE

## 2019-10-01 PROCEDURE — 78452 HT MUSCLE IMAGE SPECT MULT: CPT

## 2019-10-01 PROCEDURE — 78452 HT MUSCLE IMAGE SPECT MULT: CPT | Performed by: INTERNAL MEDICINE

## 2019-10-01 RX ADMIN — TECHNETIUM TC 99M SESTAMIBI 1 DOSE: 1 INJECTION INTRAVENOUS at 09:10

## 2019-10-01 RX ADMIN — TECHNETIUM TC 99M SESTAMIBI 1 DOSE: 1 INJECTION INTRAVENOUS at 08:00

## 2019-10-01 RX ADMIN — REGADENOSON 0.4 MG: 0.08 INJECTION, SOLUTION INTRAVENOUS at 09:07

## 2019-10-02 LAB
BH CV STRESS BP STAGE 1: NORMAL
BH CV STRESS COMMENTS STAGE 1: NORMAL
BH CV STRESS DOSE REGADENOSON STAGE 1: 0.4
BH CV STRESS DURATION MIN STAGE 1: 0
BH CV STRESS DURATION SEC STAGE 1: 10
BH CV STRESS HR STAGE 1: 103
BH CV STRESS PROTOCOL 1: NORMAL
BH CV STRESS RECOVERY BP: NORMAL MMHG
BH CV STRESS RECOVERY HR: 76 BPM
BH CV STRESS STAGE 1: 1
LV EF NUC BP: 87 %
MAXIMAL PREDICTED HEART RATE: 152 BPM
PERCENT MAX PREDICTED HR: 67.76 %
STRESS BASELINE BP: NORMAL MMHG
STRESS BASELINE HR: 64 BPM
STRESS PERCENT HR: 80 %
STRESS POST EXERCISE DUR SEC: 10 SEC
STRESS POST PEAK BP: NORMAL MMHG
STRESS POST PEAK HR: 103 BPM
STRESS TARGET HR: 129 BPM

## 2019-10-03 ENCOUNTER — TELEPHONE (OUTPATIENT)
Dept: INTERNAL MEDICINE | Facility: CLINIC | Age: 69
End: 2019-10-03

## 2019-10-03 NOTE — TELEPHONE ENCOUNTER
----- Message from Alok Lovett MD sent at 10/2/2019  5:10 PM CDT -----  Can you call the patient and notify them of normal stress test? Thanks

## 2019-10-07 ENCOUNTER — LAB (OUTPATIENT)
Dept: LAB | Facility: HOSPITAL | Age: 69
End: 2019-10-07

## 2019-10-07 DIAGNOSIS — M89.9 DISORDER OF BONE: ICD-10-CM

## 2019-10-07 DIAGNOSIS — I63.9 CEREBROVASCULAR ACCIDENT (CVA), UNSPECIFIED MECHANISM (HCC): ICD-10-CM

## 2019-10-07 DIAGNOSIS — I10 ESSENTIAL HYPERTENSION: ICD-10-CM

## 2019-10-07 DIAGNOSIS — Z86.12 HISTORY OF POLIOMYELITIS: ICD-10-CM

## 2019-10-07 DIAGNOSIS — E78.2 MIXED HYPERLIPIDEMIA: ICD-10-CM

## 2019-10-07 DIAGNOSIS — E11.59 TYPE 2 DIABETES MELLITUS WITH OTHER CIRCULATORY COMPLICATION, WITHOUT LONG-TERM CURRENT USE OF INSULIN (HCC): ICD-10-CM

## 2019-10-07 DIAGNOSIS — Z11.59 NEED FOR HEPATITIS C SCREENING TEST: ICD-10-CM

## 2019-10-07 LAB
25(OH)D3 SERPL-MCNC: 30.3 NG/ML (ref 30–100)
ALBUMIN SERPL-MCNC: 4.3 G/DL (ref 3.5–5.2)
ALBUMIN UR-MCNC: <1.2 MG/DL
ALBUMIN/GLOB SERPL: 1.5 G/DL
ALP SERPL-CCNC: 75 U/L (ref 39–117)
ALT SERPL W P-5'-P-CCNC: 19 U/L (ref 1–33)
ANION GAP SERPL CALCULATED.3IONS-SCNC: 13 MMOL/L (ref 5–15)
AST SERPL-CCNC: 20 U/L (ref 1–32)
BILIRUB SERPL-MCNC: 0.5 MG/DL (ref 0.2–1.2)
BUN BLD-MCNC: 17 MG/DL (ref 8–23)
BUN/CREAT SERPL: 29.8 (ref 7–25)
CALCIUM SPEC-SCNC: 8.9 MG/DL (ref 8.6–10.5)
CHLORIDE SERPL-SCNC: 101 MMOL/L (ref 98–107)
CHOLEST SERPL-MCNC: 131 MG/DL (ref 0–200)
CO2 SERPL-SCNC: 27 MMOL/L (ref 22–29)
CREAT BLD-MCNC: 0.57 MG/DL (ref 0.57–1)
CREAT UR-MCNC: 59.8 MG/DL
GFR SERPL CREATININE-BSD FRML MDRD: 105 ML/MIN/1.73
GLOBULIN UR ELPH-MCNC: 2.8 GM/DL
GLUCOSE BLD-MCNC: 129 MG/DL (ref 65–99)
HCV AB SER DONR QL: NORMAL
HDLC SERPL-MCNC: 50 MG/DL (ref 40–60)
LDLC SERPL CALC-MCNC: 69 MG/DL (ref 0–100)
LDLC/HDLC SERPL: 1.38 {RATIO}
MICROALBUMIN/CREAT UR: NORMAL MG/G{CREAT}
POTASSIUM BLD-SCNC: 4.5 MMOL/L (ref 3.5–5.2)
PROT SERPL-MCNC: 7.1 G/DL (ref 6–8.5)
SODIUM BLD-SCNC: 141 MMOL/L (ref 136–145)
TRIGL SERPL-MCNC: 61 MG/DL (ref 0–150)
VLDLC SERPL-MCNC: 12.2 MG/DL (ref 5–40)

## 2019-10-07 PROCEDURE — 36415 COLL VENOUS BLD VENIPUNCTURE: CPT

## 2019-10-07 PROCEDURE — 86803 HEPATITIS C AB TEST: CPT | Performed by: INTERNAL MEDICINE

## 2019-10-07 PROCEDURE — 80053 COMPREHEN METABOLIC PANEL: CPT | Performed by: INTERNAL MEDICINE

## 2019-10-07 PROCEDURE — 82043 UR ALBUMIN QUANTITATIVE: CPT | Performed by: INTERNAL MEDICINE

## 2019-10-07 PROCEDURE — 82570 ASSAY OF URINE CREATININE: CPT | Performed by: INTERNAL MEDICINE

## 2019-10-07 PROCEDURE — 82306 VITAMIN D 25 HYDROXY: CPT | Performed by: INTERNAL MEDICINE

## 2019-10-07 PROCEDURE — 80061 LIPID PANEL: CPT | Performed by: INTERNAL MEDICINE

## 2019-10-09 ENCOUNTER — OFFICE VISIT (OUTPATIENT)
Dept: INTERNAL MEDICINE | Facility: CLINIC | Age: 69
End: 2019-10-09

## 2019-10-09 VITALS
WEIGHT: 145 LBS | RESPIRATION RATE: 12 BRPM | OXYGEN SATURATION: 98 % | HEART RATE: 70 BPM | SYSTOLIC BLOOD PRESSURE: 134 MMHG | TEMPERATURE: 98.6 F | HEIGHT: 63 IN | BODY MASS INDEX: 25.69 KG/M2 | DIASTOLIC BLOOD PRESSURE: 82 MMHG

## 2019-10-09 DIAGNOSIS — Z53.21 PATIENT LEFT WITHOUT BEING SEEN: Primary | ICD-10-CM

## 2019-10-09 NOTE — PROGRESS NOTES
"Chief Complaint   Patient presents with   • Follow-up     Patient reports chest pain has resolved         History:  Rosalba Churchill is a 69 y.o. female who presents today for evaluation of the above problems.      HPI    ROS:  Review of Systems    Allergies   Allergen Reactions   • Erythromycin Unknown (See Comments)   • Ibuprofen Unknown (See Comments)   • Moxifloxacin Unknown (See Comments)         Current Outpatient Medications:   •  amLODIPine (NORVASC) 10 MG tablet, Take 10 mg by mouth Daily., Disp: , Rfl: 0  •  aspirin 81 MG EC tablet, Take 1 tablet by mouth Daily., Disp: 30 tablet, Rfl: 5  •  atorvastatin (LIPITOR) 40 MG tablet, Take 1 tablet by mouth Every Other Day., Disp: , Rfl:   •  ezetimibe (ZETIA) 10 MG tablet, Take 10 mg by mouth Daily., Disp: , Rfl: 2  •  INVOKANA 300 MG tablet, Take 1 tablet by mouth Daily., Disp: , Rfl:   •  lisinopril (PRINIVIL,ZESTRIL) 20 MG tablet, Take 20 mg by mouth 2 (Two) Times a Day., Disp: , Rfl: 1  •  metFORMIN (GLUCOPHAGE) 500 MG tablet, Take 1 tablet by mouth 2 (Two) Times a Day., Disp: , Rfl:   •  sertraline (ZOLOFT) 100 MG tablet, Take 100 mg by mouth Daily. Patient is taking every other day right now.   10-9-19, Disp: , Rfl: 3    OBJECTIVE:  Visit Vitals  /82 (BP Location: Left arm, Patient Position: Sitting, Cuff Size: Adult)   Pulse 70   Temp 98.6 °F (37 °C) (Temporal)   Resp 12   Ht 160 cm (62.99\")   Wt 65.8 kg (145 lb)   SpO2 98%   BMI 25.69 kg/m²      Physical Exam    Assessment/Plan    Rosalba was seen today for follow-up.    Diagnoses and all orders for this visit:    Flu vaccine need  -     Fluad Quad >65 years ([id number])        Education materials and an After Visit Summary were printed and given to the patient at discharge.    No Follow-up on file.      Patient's Body mass index is 25.69 kg/m². BMI is {BMI range:47045}.      TALIA Lovett MD   12:08 PM  10/9/2019     "

## 2019-10-24 ENCOUNTER — OFFICE VISIT (OUTPATIENT)
Dept: INTERNAL MEDICINE | Facility: CLINIC | Age: 69
End: 2019-10-24

## 2019-10-24 VITALS
HEIGHT: 63 IN | RESPIRATION RATE: 16 BRPM | HEART RATE: 72 BPM | DIASTOLIC BLOOD PRESSURE: 80 MMHG | BODY MASS INDEX: 26.4 KG/M2 | OXYGEN SATURATION: 99 % | SYSTOLIC BLOOD PRESSURE: 144 MMHG | WEIGHT: 149 LBS

## 2019-10-24 DIAGNOSIS — I10 ESSENTIAL HYPERTENSION: ICD-10-CM

## 2019-10-24 DIAGNOSIS — Z00.00 MEDICARE ANNUAL WELLNESS VISIT, SUBSEQUENT: Primary | ICD-10-CM

## 2019-10-24 DIAGNOSIS — Z13.820 OSTEOPOROSIS SCREENING: ICD-10-CM

## 2019-10-24 DIAGNOSIS — E11.59 TYPE 2 DIABETES MELLITUS WITH OTHER CIRCULATORY COMPLICATION, WITHOUT LONG-TERM CURRENT USE OF INSULIN (HCC): ICD-10-CM

## 2019-10-24 DIAGNOSIS — R93.7 ABNORMAL FINDINGS ON DIAGNOSTIC IMAGING OF OTHER PARTS OF MUSCULOSKELETAL SYSTEM: ICD-10-CM

## 2019-10-24 PROCEDURE — 90653 IIV ADJUVANT VACCINE IM: CPT | Performed by: INTERNAL MEDICINE

## 2019-10-24 PROCEDURE — 96160 PT-FOCUSED HLTH RISK ASSMT: CPT | Performed by: INTERNAL MEDICINE

## 2019-10-24 PROCEDURE — G0439 PPPS, SUBSEQ VISIT: HCPCS | Performed by: INTERNAL MEDICINE

## 2019-10-24 PROCEDURE — G0008 ADMIN INFLUENZA VIRUS VAC: HCPCS | Performed by: INTERNAL MEDICINE

## 2019-10-24 RX ORDER — AMLODIPINE BESYLATE 10 MG/1
10 TABLET ORAL DAILY
Qty: 90 TABLET | Refills: 3 | Status: SHIPPED | OUTPATIENT
Start: 2019-10-24 | End: 2020-09-16

## 2019-10-24 NOTE — PATIENT INSTRUCTIONS
Advance Directive    Advance directives are legal documents that let you make choices ahead of time about your health care and medical treatment in case you become unable to communicate for yourself. Advance directives are a way for you to communicate your wishes to family, friends, and health care providers. This can help convey your decisions about end-of-life care if you become unable to communicate.  Discussing and writing advance directives should happen over time rather than all at once. Advance directives can be changed depending on your situation and what you want, even after you have signed the advance directives.  If you do not have an advance directive, some states assign family decision makers to act on your behalf based on how closely you are related to them. Each state has its own laws regarding advance directives. You may want to check with your health care provider, , or state representative about the laws in your state. There are different types of advance directives, such as:  · Medical power of .  · Living will.  · Do not resuscitate (DNR) or do not attempt resuscitation (DNAR) order.  Health care proxy and medical power of   A health care proxy, also called a health care agent, is a person who is appointed to make medical decisions for you in cases in which you are unable to make the decisions yourself. Generally, people choose someone they know well and trust to represent their preferences. Make sure to ask this person for an agreement to act as your proxy. A proxy may have to exercise judgment in the event of a medical decision for which your wishes are not known.  A medical power of  is a legal document that names your health care proxy. Depending on the laws in your state, after the document is written, it may also need to be:  · Signed.  · Notarized.  · Dated.  · Copied.  · Witnessed.  · Incorporated into your medical record.  You may also want to appoint  someone to manage your financial affairs in a situation in which you are unable to do so. This is called a durable power of  for finances. It is a separate legal document from the durable power of  for health care. You may choose the same person or someone different from your health care proxy to act as your agent in financial matters.  If you do not appoint a proxy, or if there is a concern that the proxy is not acting in your best interests, a court-appointed guardian may be designated to act on your behalf.  Living will  A living will is a set of instructions documenting your wishes about medical care when you cannot express them yourself. Health care providers should keep a copy of your living will in your medical record. You may want to give a copy to family members or friends. To alert caregivers in case of an emergency, you can place a card in your wallet to let them know that you have a living will and where they can find it. A living will is used if you become:  · Terminally ill.  · Incapacitated.  · Unable to communicate or make decisions.  Items to consider in your living will include:  · The use or non-use of life-sustaining equipment, such as dialysis machines and breathing machines (ventilators).  · A DNR or DNAR order, which is the instruction not to use cardiopulmonary resuscitation (CPR) if breathing or heartbeat stops.  · The use or non-use of tube feeding.  · Withholding of food and fluids.  · Comfort (palliative) care when the goal becomes comfort rather than a cure.  · Organ and tissue donation.  A living will does not give instructions for distributing your money and property if you should pass away. It is recommended that you seek the advice of a  when writing a will. Decisions about taxes, beneficiaries, and asset distribution will be legally binding. This process can relieve your family and friends of any concerns surrounding disputes or questions that may come up about  the distribution of your assets.  DNR or DNAR  A DNR or DNAR order is a request not to have CPR in the event that your heart stops beating or you stop breathing. If a DNR or DNAR order has not been made and shared, a health care provider will try to help any patient whose heart has stopped or who has stopped breathing. If you plan to have surgery, talk with your health care provider about how your DNR or DNAR order will be followed if problems occur.  Summary  · Advance directives are the legal documents that allow you to make choices ahead of time about your health care and medical treatment in case you become unable to communicate for yourself.  · The process of discussing and writing advance directives should happen over time. You can change the advance directives, even after you have signed them.  · Advance directives include DNR or DNAR orders, living maguire, and designating an agent as your medical power of .  This information is not intended to replace advice given to you by your health care provider. Make sure you discuss any questions you have with your health care provider.  Document Released: 03/26/2009 Document Revised: 11/06/2017 Document Reviewed: 11/06/2017  [employer] Patient Education © 2019 [employer]  Metformin tablets  What is this medicine?  METFORMIN (met FOR min) is used to treat type 2 diabetes. It helps to control blood sugar. Treatment is combined with diet and exercise. This medicine can be used alone or with other medicines for diabetes.  This medicine may be used for other purposes; ask your health care provider or pharmacist if you have questions.  COMMON BRAND NAME(S): Glucophage  What should I tell my health care provider before I take this medicine?  They need to know if you have any of these conditions:  -anemia  -dehydration  -heart disease  -frequently drink alcohol-containing beverages  -kidney disease  -liver disease  -polycystic ovary syndrome  -serious  infection or injury  -vomiting  -an unusual or allergic reaction to metformin, other medicines, foods, dyes, or preservatives  -pregnant or trying to get pregnant  -breast-feeding  How should I use this medicine?  Take this medicine by mouth with a glass of water. Follow the directions on the prescription label. Take this medicine with food. Take your medicine at regular intervals. Do not take your medicine more often than directed. Do not stop taking except on your doctor's advice.  Talk to your pediatrician regarding the use of this medicine in children. While this drug may be prescribed for children as young as 10 years of age for selected conditions, precautions do apply.  Overdosage: If you think you have taken too much of this medicine contact a poison control center or emergency room at once.  NOTE: This medicine is only for you. Do not share this medicine with others.  What if I miss a dose?  If you miss a dose, take it as soon as you can. If it is almost time for your next dose, take only that dose. Do not take double or extra doses.  What may interact with this medicine?  Do not take this medicine with any of the following medications:  -certain contrast medicines given before X-rays, CT scans, MRI, or other procedures  -dofetilide  This medicine may also interact with the following medications:  -acetazolamide  -alcohol  -certain antivirals for HIV or hepatitis  -certain medicines for blood pressure, heart disease, irregular heart beat  -cimetidine  -dichlorphenamide  -digoxin  -diuretics  -female hormones, like estrogens or progestins and birth control pills  -glycopyrrolate  -isoniazid  -lamotrigine  -memantine  -methazolamide  -metoclopramide  -midodrine  -niacin  -phenothiazines like chlorpromazine, mesoridazine, prochlorperazine, thioridazine  -phenytoin  -ranolazine  -steroid medicines like prednisone or cortisone  -stimulant medicines for attention disorders, weight loss, or to stay awake  -thyroid  medicines  -topiramate  -trospium  -vandetanib  -zonisamide  This list may not describe all possible interactions. Give your health care provider a list of all the medicines, herbs, non-prescription drugs, or dietary supplements you use. Also tell them if you smoke, drink alcohol, or use illegal drugs. Some items may interact with your medicine.  What should I watch for while using this medicine?  Visit your doctor or health care professional for regular checks on your progress.  A test called the HbA1C (A1C) will be monitored. This is a simple blood test. It measures your blood sugar control over the last 2 to 3 months. You will receive this test every 3 to 6 months.  Learn how to check your blood sugar. Learn the symptoms of low and high blood sugar and how to manage them.  Always carry a quick-source of sugar with you in case you have symptoms of low blood sugar. Examples include hard sugar candy or glucose tablets. Make sure others know that you can choke if you eat or drink when you develop serious symptoms of low blood sugar, such as seizures or unconsciousness. They must get medical help at once.  Tell your doctor or health care professional if you have high blood sugar. You might need to change the dose of your medicine. If you are sick or exercising more than usual, you might need to change the dose of your medicine.  Do not skip meals. Ask your doctor or health care professional if you should avoid alcohol. Many nonprescription cough and cold products contain sugar or alcohol. These can affect blood sugar.  This medicine may cause ovulation in premenopausal women who do not have regular monthly periods. This may increase your chances of becoming pregnant. You should not take this medicine if you become pregnant or think you may be pregnant. Talk with your doctor or health care professional about your birth control options while taking this medicine. Contact your doctor or health care professional right  away if you think you are pregnant.  If you are going to need surgery, a MRI, CT scan, or other procedure, tell your doctor that you are taking this medicine. You may need to stop taking this medicine before the procedure.  Wear a medical ID bracelet or chain, and carry a card that describes your disease and details of your medicine and dosage times.  This medicine may cause a decrease in folic acid and vitamin B12. You should make sure that you get enough vitamins while you are taking this medicine. Discuss the foods you eat and the vitamins you take with your health care professional.  What side effects may I notice from receiving this medicine?  Side effects that you should report to your doctor or health care professional as soon as possible:  -allergic reactions like skin rash, itching or hives, swelling of the face, lips, or tongue  -breathing problems  -feeling faint or lightheaded, falls  -muscle aches or pains  -signs and symptoms of low blood sugar such as feeling anxious, confusion, dizziness, increased hunger, unusually weak or tired, sweating, shakiness, cold, irritable, headache, blurred vision, fast heartbeat, loss of consciousness  -slow or irregular heartbeat  -unusual stomach pain or discomfort  -unusually tired or weak  Side effects that usually do not require medical attention (report to your doctor or health care professional if they continue or are bothersome):  -diarrhea  -headache  -heartburn  -metallic taste in mouth  -nausea  -stomach gas, upset  This list may not describe all possible side effects. Call your doctor for medical advice about side effects. You may report side effects to FDA at 6-654-FDA-4597.  Where should I keep my medicine?  Keep out of the reach of children.  Store at room temperature between 15 and 30 degrees C (59 and 86 degrees F). Protect from moisture and light. Throw away any unused medicine after the expiration date.  NOTE: This sheet is a summary. It may not cover  all possible information. If you have questions about this medicine, talk to your doctor, pharmacist, or health care provider.  © 2019 [employer] (2019-01-24 19:15:19)

## 2019-10-24 NOTE — PROGRESS NOTES
The ABCs of the Annual Wellness Visit  Subsequent Medicare Wellness Visit    Chief Complaint   Patient presents with   • subsequent medicare wellness       Subjective   History of Present Illness:  Rosalba Churchill is a 69 y.o. female who presents for a Subsequent Medicare Wellness Visit.  Weak after stroke, especially left leg, interested in silver sneakers.  Right foot drop related to polio as a child  Going to change diet and exercise in order to control diabetes without more medicine.  Wants to come off invokana due to urinary frequency, irritation and concern for amputation    Has had pneumonia shot at the age of 66 but is not sure which one.  We will try to get records from Dr. Mendoza [facility], [place]    HEALTH RISK ASSESSMENT    Recent Hospitalizations:  No hospitalization(s) within the last year.    Current Medical Providers:  Patient Care Team:  Alok Lovett MD as PCP - General (Hospitalist)    Smoking Status:  Social History     Tobacco Use   Smoking Status Never Smoker   Smokeless Tobacco Never Used       Alcohol Consumption:  Social History     Substance and Sexual Activity   Alcohol Use No   • Frequency: Never       Depression Screen:   PHQ-2/PHQ-9 Depression Screening 10/24/2019   Little interest or pleasure in doing things 0   Feeling down, depressed, or hopeless 0   Total Score 0       Fall Risk Screen:  STEADI Fall Risk Assessment was completed, and patient is at HIGH risk for falls. Assessment completed on:10/24/2019    Health Habits and Functional and Cognitive Screening:  Functional & Cognitive Status 10/24/2019   Do you have difficulty preparing food and eating? No   Do you have difficulty bathing yourself, getting dressed or grooming yourself? No   Do you have difficulty using the toilet? No   Do you have difficulty moving around from place to place? Yes   Do you have trouble with steps or getting out of a bed or a chair? Yes   Current Diet Unhealthy Diet   Dental  Exam Not up to date   Eye Exam Not up to date   Exercise (times per week) 0 times per week   Current Exercise Activities Include None   Do you need help using the phone?  No   Are you deaf or do you have serious difficulty hearing?  No   Do you need help with transportation? No   Do you need help shopping? No   Do you need help preparing meals?  No   Do you need help with housework?  No   Do you need help with laundry? No   Do you need help taking your medications? No   Do you need help managing money? No   Do you ever drive or ride in a car without wearing a seat belt? No   Have you felt unusual stress, anger or loneliness in the last month? No   Who do you live with? Alone   If you need help, do you have trouble finding someone available to you? No   Have you been bothered in the last four weeks by sexual problems? No   Do you have difficulty concentrating, remembering or making decisions? No       Does the patient have evidence of cognitive impairment? No    Asprin use counseling:Taking ASA appropriately as indicated    Age-appropriate Screening Schedule:  Refer to the list below for future screening recommendations based on patient's age, sex and/or medical conditions. Orders for these recommended tests are listed in the plan section. The patient has been provided with a written plan.    Health Maintenance   Topic Date Due   • TDAP/TD VACCINES (1 - Tdap) 04/29/1970   • ZOSTER VACCINE (1 of 2) 04/29/2001   • PNEUMOCOCCAL VACCINES (65+ LOW/MEDIUM RISK) (1 of 2 - PCV13) 04/29/2016   • INFLUENZA VACCINE  08/01/2019   • DIABETIC EYE EXAM  10/01/2019   • HEMOGLOBIN A1C  01/01/2020   • DIABETIC FOOT EXAM  07/09/2020   • LIPID PANEL  10/07/2020   • URINE MICROALBUMIN  10/07/2020   • COLONOSCOPY  01/01/2021   • MAMMOGRAM  07/15/2021          The following portions of the patient's history were reviewed and updated as appropriate: allergies, current medications, past family history, past medical history, past social  history, past surgical history and problem list.    Outpatient Medications Prior to Visit   Medication Sig Dispense Refill   • aspirin 81 MG EC tablet Take 1 tablet by mouth Daily. 30 tablet 5   • atorvastatin (LIPITOR) 40 MG tablet Take 1 tablet by mouth Every Other Day.     • ezetimibe (ZETIA) 10 MG tablet Take 10 mg by mouth Daily.  2   • lisinopril (PRINIVIL,ZESTRIL) 20 MG tablet Take 20 mg by mouth 2 (Two) Times a Day.  1   • sertraline (ZOLOFT) 100 MG tablet Take 100 mg by mouth Daily. Patient is taking every other day right now.   10-9-19  3   • amLODIPine (NORVASC) 10 MG tablet Take 10 mg by mouth Daily.  0   • INVOKANA 300 MG tablet Take 1 tablet by mouth Daily.     • metFORMIN (GLUCOPHAGE) 500 MG tablet Take 1 tablet by mouth 2 (Two) Times a Day.       No facility-administered medications prior to visit.        Patient Active Problem List   Diagnosis   • Essential hypertension   • Hyperlipidemia   • Type 2 diabetes mellitus with circulatory disorder, without long-term current use of insulin (CMS/[facility])   • Stroke (CMS/[facility])   • History of poliomyelitis       Advanced Care Planning:  Patient does not have an advance directive - information provided to the patient today    Review of Systems   Constitutional: Negative for activity change, appetite change, fatigue, fever and unexpected weight change.   HENT: Negative for nosebleeds, sore throat and trouble swallowing.    Eyes: Negative for pain and visual disturbance.   Respiratory: Negative for cough, chest tightness and shortness of breath.    Cardiovascular: Negative for chest pain, palpitations and leg swelling.   Gastrointestinal: Negative for abdominal pain, constipation, diarrhea, nausea and vomiting.   Endocrine: Negative for cold intolerance and heat intolerance.   Genitourinary: Positive for frequency. Negative for hematuria.   Musculoskeletal: Positive for arthralgias and gait problem. Negative for back pain, neck pain and neck stiffness.   Skin:  "Negative for rash and wound.   Neurological: Positive for weakness. Negative for dizziness, syncope, light-headedness and headaches.   Hematological: Negative for adenopathy. Does not bruise/bleed easily.   Psychiatric/Behavioral: Negative for agitation, behavioral problems and confusion.       Compared to one year ago, the patient feels her physical health is worse.  Compared to one year ago, the patient feels her mental health is the same.    Reviewed chart for potential of high risk medication in the elderly: yes  Reviewed chart for potential of harmful drug interactions in the elderly:yes    Objective         Vitals:    10/24/19 1127   BP: 144/80   BP Location: Left arm   Patient Position: Sitting   Cuff Size: Adult   Pulse: 72   Resp: 16   SpO2: 99%   Weight: 67.6 kg (149 lb)   Height: 160 cm (62.99\")       Body mass index is 26.4 kg/m².  Discussed the patient's BMI with her. The BMI is above average; BMI management plan is completed.    Physical Exam   Constitutional: She is oriented to person, place, and time. She appears well-developed and well-nourished. No distress.   HENT:   Head: Normocephalic and atraumatic.   Cardiovascular: Normal rate, regular rhythm and normal heart sounds. Exam reveals no gallop and no friction rub.   No murmur heard.  Pulmonary/Chest: Effort normal and breath sounds normal. No stridor. She has no wheezes. She has no rales.   Abdominal: Soft. Bowel sounds are normal. She exhibits no distension. There is no tenderness. There is no rebound and no guarding.   Musculoskeletal: She exhibits no edema or tenderness.   Right leg in a brace   Neurological: She is alert and oriented to person, place, and time. No cranial nerve deficit.   Skin: Skin is warm and dry.   Psychiatric: She has a normal mood and affect. Her behavior is normal.       Lab Results   Component Value Date    TRIG 61 10/07/2019    HDL 50 10/07/2019    LDL 69 10/07/2019    VLDL 12.2 10/07/2019        Assessment/Plan "   Medicare Risks and Personalized Health Plan  CMS Preventative Services Quick Reference  Advance Directive Discussion  Breast Cancer/Mammogram Screening  Cardiovascular risk  Dementia/Memory   Depression/Dysphoria  Diabetic Lab Screening   Fall Risk  Immunizations Discussed/Encouraged (specific immunizations; Pneumococcal 23 )  Obesity/Overweight   Osteoprorosis Risk  Polypharmacy    The above risks/problems have been discussed with the patient.  Pertinent information has been shared with the patient in the After Visit Summary.  Follow up plans and orders are seen below in the Assessment/Plan Section.    Diagnoses and all orders for this visit:    1. Medicare annual wellness visit, subsequent (Primary)    2. Type 2 diabetes mellitus with other circulatory complication, without long-term current use of insulin (CMS/[facility])  -     metFORMIN (GLUCOPHAGE) 500 MG tablet; Take 2 tablets by mouth 2 (Two) Times a Day With Meals.  Dispense: 120 tablet; Refill: 2    3. Essential hypertension  -     amLODIPine (NORVASC) 10 MG tablet; Take 1 tablet by mouth Daily.  Dispense: 90 tablet; Refill: 3    4. Osteoporosis screening  -     DEXA Bone Density Axial    5. Abnormal findings on diagnostic imaging of other parts of musculoskeletal system   -     DEXA Bone Density Axial    Other orders  -     Fluad Quad >65 years      She wants to come off Invokana and I think this is reasonable.  Her last A1c was 6.8.  She is only on metformin 500 mg twice daily so I will increase to 1000 mg twice daily and stop her Invokana.  She is was concerned for frequent urinary tract infections, irritation, urinary frequency and lower extremity amputations.  We discussed if she was not successful with exercise and diet we may need to add another medication in 3 months upon checkup.  However, I believe she can make this change successfully.  We discussed advanced directive and she will get a well as well as a living will.  She also needs eye and dental  exam.  Her last bone density scan was 12 years ago and I have reordered one today.  Refill her Norvasc.    Follow-up in 3 months to recheck her glucose and A1c.    Follow Up:  Return in about 3 months (around 1/24/2020) for Recheck BS.     An After Visit Summary and PPPS were given to the patient.

## 2019-11-01 ENCOUNTER — HOSPITAL ENCOUNTER (OUTPATIENT)
Dept: BONE DENSITY | Facility: HOSPITAL | Age: 69
Discharge: HOME | End: 2019-11-01
Admitting: INTERNAL MEDICINE

## 2019-11-01 PROCEDURE — 77080 DXA BONE DENSITY AXIAL: CPT

## 2019-11-22 RX ORDER — LISINOPRIL 20 MG/1
20 TABLET ORAL 2 TIMES DAILY
Qty: 180 TABLET | Refills: 3 | OUTPATIENT
Start: 2019-11-22

## 2019-11-22 RX ORDER — EZETIMIBE 10 MG/1
10 TABLET ORAL DAILY
Qty: 90 TABLET | Refills: 3 | OUTPATIENT
Start: 2019-11-22

## 2019-11-22 NOTE — TELEPHONE ENCOUNTER
Pt would like a 90 day refill supply on her Lisinopril 20 mg twice a day and her Zetia 10 mg tablet sent to [facility] pharmacy by the parlor.

## 2020-01-16 DIAGNOSIS — E11.59 TYPE 2 DIABETES MELLITUS WITH OTHER CIRCULATORY COMPLICATION, WITHOUT LONG-TERM CURRENT USE OF INSULIN (HCC): ICD-10-CM

## 2020-01-27 ENCOUNTER — OFFICE VISIT (OUTPATIENT)
Dept: INTERNAL MEDICINE | Facility: CLINIC | Age: 70
End: 2020-01-27

## 2020-01-27 VITALS
BODY MASS INDEX: 27.64 KG/M2 | RESPIRATION RATE: 17 BRPM | SYSTOLIC BLOOD PRESSURE: 132 MMHG | OXYGEN SATURATION: 99 % | DIASTOLIC BLOOD PRESSURE: 60 MMHG | HEIGHT: 63 IN | TEMPERATURE: 98.1 F | WEIGHT: 156 LBS | HEART RATE: 74 BPM

## 2020-01-27 DIAGNOSIS — R01.1 HEART MURMUR: ICD-10-CM

## 2020-01-27 DIAGNOSIS — I63.9 CEREBROVASCULAR ACCIDENT (CVA), UNSPECIFIED MECHANISM (HCC): ICD-10-CM

## 2020-01-27 DIAGNOSIS — I10 ESSENTIAL HYPERTENSION: ICD-10-CM

## 2020-01-27 DIAGNOSIS — E11.59 TYPE 2 DIABETES MELLITUS WITH OTHER CIRCULATORY COMPLICATION, WITHOUT LONG-TERM CURRENT USE OF INSULIN (HCC): Primary | ICD-10-CM

## 2020-01-27 DIAGNOSIS — E78.2 MIXED HYPERLIPIDEMIA: ICD-10-CM

## 2020-01-27 LAB
GLUCOSE BLDC GLUCOMTR-MCNC: 222 MG/DL (ref 70–130)
HBA1C MFR BLD: 6.9 %

## 2020-01-27 PROCEDURE — 83036 HEMOGLOBIN GLYCOSYLATED A1C: CPT | Performed by: INTERNAL MEDICINE

## 2020-01-27 PROCEDURE — 99214 OFFICE O/P EST MOD 30 MIN: CPT | Performed by: INTERNAL MEDICINE

## 2020-01-27 PROCEDURE — 82962 GLUCOSE BLOOD TEST: CPT | Performed by: INTERNAL MEDICINE

## 2020-01-27 NOTE — PROGRESS NOTES
Chief Complaint   Patient presents with   • Follow-up   • Diabetes         History:  Rosalba Churchill is a 69 y.o. female who presents today for evaluation of the above problems.    Here for 3-month follow-up on her diabetes, hypertension hyperlipidemia and stroke.  Her A1c today is 6.9 and her glucose is 222.  This is not a fasting glucose and she just had a sandwich.  She reports not eating quite as well during the holidays but is getting better.  Blood pressure is well controlled with lisinopril and Norvasc.  She has been off of the Zetia for about 1 month but is on Lipitor 40 mg.  Her stroke was 2 years ago and she is wondering about her future stroke risk.  She has been having some palpitations lately but no chest pain.    HPI    ROS:  Review of Systems   Constitutional: Negative for activity change.   HENT: Negative.  Negative for congestion and trouble swallowing.    Respiratory: Negative for cough and shortness of breath.    Cardiovascular: Positive for palpitations. Negative for chest pain and leg swelling.   Gastrointestinal: Negative.    Endocrine: Negative for polydipsia and polyuria.   Genitourinary: Negative.    Musculoskeletal: Positive for gait problem.   Hematological: Negative.    Psychiatric/Behavioral: Negative.        Allergies   Allergen Reactions   • Erythromycin Unknown (See Comments)   • Ibuprofen Unknown (See Comments)   • Moxifloxacin Unknown (See Comments)         Current Outpatient Medications:   •  amLODIPine (NORVASC) 10 MG tablet, Take 1 tablet by mouth Daily., Disp: 90 tablet, Rfl: 3  •  aspirin 81 MG EC tablet, Take 1 tablet by mouth Daily., Disp: 30 tablet, Rfl: 5  •  atorvastatin (LIPITOR) 40 MG tablet, Take 1 tablet by mouth Every Other Day., Disp: , Rfl:   •  lisinopril (PRINIVIL,ZESTRIL) 20 MG tablet, Take 20 mg by mouth 2 (Two) Times a Day., Disp: , Rfl: 1  •  metFORMIN (GLUCOPHAGE) 500 MG tablet, TAKE 2 TABLETS BY MOUTH 2 (TWO) TIMES A DAY WITH MEALS., Disp: 360 tablet, Rfl:  "1  •  sertraline (ZOLOFT) 100 MG tablet, Take 100 mg by mouth Daily. Patient is taking every other day right now.   10-9-19, Disp: , Rfl: 3    OBJECTIVE:  Visit Vitals  /60 (BP Location: Left arm, Patient Position: Sitting)   Pulse 74   Temp 98.1 °F (36.7 °C) (Oral)   Resp 17   Ht 160 cm (62.99\")   Wt 70.8 kg (156 lb)   SpO2 99%   Breastfeeding No   BMI 27.64 kg/m²      Physical Exam   Constitutional: She appears well-developed and well-nourished.   HENT:   Head: Normocephalic and atraumatic.   Mouth/Throat: No oropharyngeal exudate.   Cardiovascular: Normal rate.   Murmur heard.  Pulmonary/Chest: Effort normal and breath sounds normal. She has no wheezes. She has no rales.   Musculoskeletal:   Leg brace due to polio.  Walks with a limp   Neurological: She is alert.   Skin: Skin is warm and dry. No erythema. No pallor.   Psychiatric: She has a normal mood and affect.   Vitals reviewed.      Assessment/Plan    Rosalba was seen today for follow-up and diabetes.    Diagnoses and all orders for this visit:    Type 2 diabetes mellitus with other circulatory complication, without long-term current use of insulin (CMS/HCC)  -     POC Glycosylated Hemoglobin (Hb A1C)  -     POCT Glucose  -     Hemoglobin A1c; Future  -     Microalbumin / Creatinine Urine Ratio - Urine, Clean Catch; Future    Essential hypertension  -     CBC w AUTO Differential; Future  -     Comprehensive metabolic panel; Future    Mixed hyperlipidemia  -     Lipid panel; Future    Cerebrovascular accident (CVA), unspecified mechanism (CMS/HCC)    Heart murmur  -     Adult Transthoracic Echo Complete W/ Cont if Necessary Per Protocol; Future      Continue metformin for diabetes.  Check A1c, microalbumin and CMP CBC and lipid panel couple days prior to the next visit.    Continue current antihypertensive    We will try off of the Zetia and continue Lipitor.  If her cholesterol rises we will restart Zetia at that time    She has a heart murmur today " which I did not appreciate previously.  Check 2D echo    Education materials and an After Visit Summary were printed and given to the patient at discharge.    Return in about 6 months (around 7/27/2020) for Recheck.      Patient's Body mass index is 27.64 kg/m². BMI is above normal parameters. Recommendations include: exercise counseling and nutrition counseling.      TALIA Lovett MD   11:48 AM  1/27/2020

## 2020-02-06 ENCOUNTER — HOSPITAL ENCOUNTER (OUTPATIENT)
Dept: CARDIOLOGY | Facility: HOSPITAL | Age: 70
Discharge: HOME | End: 2020-02-06
Admitting: INTERNAL MEDICINE

## 2020-02-06 VITALS
SYSTOLIC BLOOD PRESSURE: 132 MMHG | DIASTOLIC BLOOD PRESSURE: 60 MMHG | BODY MASS INDEX: 27.64 KG/M2 | HEIGHT: 63 IN | WEIGHT: 156 LBS

## 2020-02-06 DIAGNOSIS — R01.1 HEART MURMUR: ICD-10-CM

## 2020-02-06 PROCEDURE — 93306 TTE W/DOPPLER COMPLETE: CPT | Performed by: INTERNAL MEDICINE

## 2020-02-06 PROCEDURE — 93306 TTE W/DOPPLER COMPLETE: CPT

## 2020-02-07 LAB
BH CV ECHO MEAS - AO MAX PG (FULL): 4.8 MMHG
BH CV ECHO MEAS - AO MAX PG: 9.9 MMHG
BH CV ECHO MEAS - AO MEAN PG (FULL): 2 MMHG
BH CV ECHO MEAS - AO MEAN PG: 5 MMHG
BH CV ECHO MEAS - AO ROOT AREA (BSA CORRECTED): 1.8
BH CV ECHO MEAS - AO ROOT AREA: 7.5 CM^2
BH CV ECHO MEAS - AO ROOT DIAM: 3.1 CM
BH CV ECHO MEAS - AO V2 MAX: 157 CM/SEC
BH CV ECHO MEAS - AO V2 MEAN: 101 CM/SEC
BH CV ECHO MEAS - AO V2 VTI: 31.5 CM
BH CV ECHO MEAS - AVA(I,A): 2.2 CM^2
BH CV ECHO MEAS - AVA(I,D): 2.2 CM^2
BH CV ECHO MEAS - AVA(V,A): 2.2 CM^2
BH CV ECHO MEAS - AVA(V,D): 2.2 CM^2
BH CV ECHO MEAS - BSA(HAYCOCK): 1.8 M^2
BH CV ECHO MEAS - BSA: 1.7 M^2
BH CV ECHO MEAS - BZI_BMI: 27.6 KILOGRAMS/M^2
BH CV ECHO MEAS - BZI_METRIC_HEIGHT: 160 CM
BH CV ECHO MEAS - BZI_METRIC_WEIGHT: 70.8 KG
BH CV ECHO MEAS - EDV(CUBED): 91.1 ML
BH CV ECHO MEAS - EDV(MOD-SP4): 54.2 ML
BH CV ECHO MEAS - EDV(TEICH): 92.4 ML
BH CV ECHO MEAS - EF(CUBED): 70.4 %
BH CV ECHO MEAS - EF(MOD-SP4): 61.8 %
BH CV ECHO MEAS - EF(TEICH): 62.1 %
BH CV ECHO MEAS - ESV(CUBED): 27 ML
BH CV ECHO MEAS - ESV(MOD-SP4): 20.7 ML
BH CV ECHO MEAS - ESV(TEICH): 35 ML
BH CV ECHO MEAS - FS: 33.3 %
BH CV ECHO MEAS - IVS/LVPW: 1
BH CV ECHO MEAS - IVSD: 0.96 CM
BH CV ECHO MEAS - LA DIMENSION: 3.4 CM
BH CV ECHO MEAS - LA/AO: 1.1
BH CV ECHO MEAS - LAT PEAK E' VEL: 8.7 CM/SEC
BH CV ECHO MEAS - LV DIASTOLIC VOL/BSA (35-75): 31.1 ML/M^2
BH CV ECHO MEAS - LV MASS(C)D: 142.3 GRAMS
BH CV ECHO MEAS - LV MASS(C)DI: 81.8 GRAMS/M^2
BH CV ECHO MEAS - LV MAX PG: 5 MMHG
BH CV ECHO MEAS - LV MEAN PG: 3 MMHG
BH CV ECHO MEAS - LV SYSTOLIC VOL/BSA (12-30): 11.9 ML/M^2
BH CV ECHO MEAS - LV V1 MAX: 112 CM/SEC
BH CV ECHO MEAS - LV V1 MEAN: 74.4 CM/SEC
BH CV ECHO MEAS - LV V1 VTI: 21.7 CM
BH CV ECHO MEAS - LVIDD: 4.5 CM
BH CV ECHO MEAS - LVIDS: 3 CM
BH CV ECHO MEAS - LVLD AP4: 7.5 CM
BH CV ECHO MEAS - LVLS AP4: 7.4 CM
BH CV ECHO MEAS - LVOT AREA (M): 3.1 CM^2
BH CV ECHO MEAS - LVOT AREA: 3.1 CM^2
BH CV ECHO MEAS - LVOT DIAM: 2 CM
BH CV ECHO MEAS - LVPWD: 0.94 CM
BH CV ECHO MEAS - MED PEAK E' VEL: 5.7 CM/SEC
BH CV ECHO MEAS - MV A MAX VEL: 98 CM/SEC
BH CV ECHO MEAS - MV DEC SLOPE: 402.5 CM/SEC^2
BH CV ECHO MEAS - MV DEC TIME: 0.18 SEC
BH CV ECHO MEAS - MV E MAX VEL: 61.6 CM/SEC
BH CV ECHO MEAS - MV E/A: 0.63
BH CV ECHO MEAS - MV P1/2T MAX VEL: 78.5 CM/SEC
BH CV ECHO MEAS - MV P1/2T: 57.1 MSEC
BH CV ECHO MEAS - MVA P1/2T LCG: 2.8 CM^2
BH CV ECHO MEAS - MVA(P1/2T): 3.9 CM^2
BH CV ECHO MEAS - SI(AO): 136.6 ML/M^2
BH CV ECHO MEAS - SI(CUBED): 36.9 ML/M^2
BH CV ECHO MEAS - SI(LVOT): 39.2 ML/M^2
BH CV ECHO MEAS - SI(MOD-SP4): 19.3 ML/M^2
BH CV ECHO MEAS - SI(TEICH): 33 ML/M^2
BH CV ECHO MEAS - SV(AO): 237.8 ML
BH CV ECHO MEAS - SV(CUBED): 64.1 ML
BH CV ECHO MEAS - SV(LVOT): 68.2 ML
BH CV ECHO MEAS - SV(MOD-SP4): 33.5 ML
BH CV ECHO MEAS - SV(TEICH): 57.4 ML
BH CV ECHO MEASUREMENTS AVERAGE E/E' RATIO: 8.56
LEFT ATRIUM VOLUME INDEX: 28 ML/M2
MAXIMAL PREDICTED HEART RATE: 152 BPM
STRESS TARGET HR: 129 BPM

## 2020-02-10 ENCOUNTER — TELEPHONE (OUTPATIENT)
Dept: INTERNAL MEDICINE | Facility: CLINIC | Age: 70
End: 2020-02-10

## 2020-02-10 DIAGNOSIS — I10 ESSENTIAL HYPERTENSION: Primary | ICD-10-CM

## 2020-02-10 RX ORDER — LISINOPRIL 20 MG/1
20 TABLET ORAL 2 TIMES DAILY
Qty: 60 TABLET | Refills: 1 | Status: SHIPPED | OUTPATIENT
Start: 2020-02-10 | End: 2020-03-09 | Stop reason: SDUPTHER

## 2020-02-11 ENCOUNTER — TELEPHONE (OUTPATIENT)
Dept: INTERNAL MEDICINE | Facility: CLINIC | Age: 70
End: 2020-02-11

## 2020-02-11 NOTE — TELEPHONE ENCOUNTER
Patient was notified     ----- Message from Alok Lovett MD sent at 2/11/2020  7:56 AM CST -----  Can you call patient to notify them of normal Echo? Thanks

## 2020-02-11 NOTE — TELEPHONE ENCOUNTER
----- Message from Alok Lovett MD sent at 2/11/2020  7:56 AM CST -----  Can you call patient to notify them of normal Echo? Thanks

## 2020-03-09 DIAGNOSIS — I10 ESSENTIAL HYPERTENSION: ICD-10-CM

## 2020-03-09 RX ORDER — LISINOPRIL 20 MG/1
20 TABLET ORAL 2 TIMES DAILY
Qty: 60 TABLET | Refills: 5 | Status: SHIPPED | OUTPATIENT
Start: 2020-03-09 | End: 2020-09-04

## 2020-04-07 RX ORDER — SERTRALINE HYDROCHLORIDE 100 MG/1
TABLET, FILM COATED ORAL
Qty: 30 TABLET | Refills: 5 | Status: SHIPPED | OUTPATIENT
Start: 2020-04-07 | End: 2021-02-08

## 2020-07-20 DIAGNOSIS — E11.59 TYPE 2 DIABETES MELLITUS WITH OTHER CIRCULATORY COMPLICATION, WITHOUT LONG-TERM CURRENT USE OF INSULIN (HCC): ICD-10-CM

## 2020-07-23 ENCOUNTER — LAB (OUTPATIENT)
Dept: LAB | Facility: HOSPITAL | Age: 70
End: 2020-07-23

## 2020-07-23 DIAGNOSIS — I10 ESSENTIAL HYPERTENSION: ICD-10-CM

## 2020-07-23 DIAGNOSIS — E78.2 MIXED HYPERLIPIDEMIA: ICD-10-CM

## 2020-07-23 DIAGNOSIS — E11.59 TYPE 2 DIABETES MELLITUS WITH OTHER CIRCULATORY COMPLICATION, WITHOUT LONG-TERM CURRENT USE OF INSULIN (HCC): ICD-10-CM

## 2020-07-23 LAB
ALBUMIN SERPL-MCNC: 4.3 G/DL (ref 3.5–5)
ALBUMIN/GLOB SERPL: 1.3 G/DL (ref 1.1–2.5)
ALP SERPL-CCNC: 76 U/L (ref 24–120)
ALT SERPL W P-5'-P-CCNC: 26 U/L (ref 0–35)
ANION GAP SERPL CALCULATED.3IONS-SCNC: 7 MMOL/L (ref 4–13)
AST SERPL-CCNC: 26 U/L (ref 7–45)
AUTO MIXED CELLS #: 0.6 10*3/MM3 (ref 0.1–2.6)
AUTO MIXED CELLS %: 9.7 % (ref 0.1–24)
BILIRUB SERPL-MCNC: 0.6 MG/DL (ref 0.1–1)
BUN SERPL-MCNC: 14 MG/DL (ref 5–21)
BUN/CREAT SERPL: 25.9
CALCIUM SPEC-SCNC: 9.3 MG/DL (ref 8.4–10.4)
CHLORIDE SERPL-SCNC: 100 MMOL/L (ref 98–110)
CHOLEST SERPL-MCNC: 152 MG/DL (ref 130–200)
CO2 SERPL-SCNC: 31 MMOL/L (ref 24–31)
CREAT SERPL-MCNC: 0.54 MG/DL (ref 0.5–1.4)
ERYTHROCYTE [DISTWIDTH] IN BLOOD BY AUTOMATED COUNT: 11.9 % (ref 12.3–15.4)
GFR SERPL CREATININE-BSD FRML MDRD: 112 ML/MIN/1.73
GLOBULIN UR ELPH-MCNC: 3.4 GM/DL
GLUCOSE SERPL-MCNC: 153 MG/DL (ref 70–100)
HBA1C MFR BLD: 7.2 % (ref 4.8–5.9)
HCT VFR BLD AUTO: 38.6 % (ref 34–46.6)
HDLC SERPL-MCNC: 45 MG/DL
HGB BLD-MCNC: 13 G/DL (ref 12–15.9)
LDLC SERPL CALC-MCNC: 78 MG/DL (ref 0–99)
LDLC/HDLC SERPL: 1.72 {RATIO}
LYMPHOCYTES # BLD AUTO: 2 10*3/MM3 (ref 0.7–3.1)
LYMPHOCYTES NFR BLD AUTO: 31.1 % (ref 19.6–45.3)
MCH RBC QN AUTO: 28.1 PG (ref 26.6–33)
MCHC RBC AUTO-ENTMCNC: 33.7 G/DL (ref 31.5–35.7)
MCV RBC AUTO: 83.4 FL (ref 79–97)
NEUTROPHILS NFR BLD AUTO: 3.9 10*3/MM3 (ref 1.7–7)
NEUTROPHILS NFR BLD AUTO: 59.2 % (ref 42.7–76)
PLATELET # BLD AUTO: 232 10*3/MM3 (ref 140–450)
PMV BLD AUTO: 8.4 FL (ref 6–12)
POTASSIUM SERPL-SCNC: 4.2 MMOL/L (ref 3.5–5.3)
PROT SERPL-MCNC: 7.7 G/DL (ref 6.3–8.7)
RBC # BLD AUTO: 4.63 10*6/MM3 (ref 3.77–5.28)
SODIUM SERPL-SCNC: 138 MMOL/L (ref 135–145)
TRIGL SERPL-MCNC: 147 MG/DL (ref 0–149)
VLDLC SERPL-MCNC: 29.4 MG/DL
WBC # BLD AUTO: 6.5 10*3/MM3 (ref 3.4–10.8)

## 2020-07-23 PROCEDURE — 80061 LIPID PANEL: CPT

## 2020-07-23 PROCEDURE — 80053 COMPREHEN METABOLIC PANEL: CPT

## 2020-07-23 PROCEDURE — 36415 COLL VENOUS BLD VENIPUNCTURE: CPT

## 2020-07-23 PROCEDURE — 85025 COMPLETE CBC W/AUTO DIFF WBC: CPT

## 2020-07-23 PROCEDURE — 82043 UR ALBUMIN QUANTITATIVE: CPT | Performed by: INTERNAL MEDICINE

## 2020-07-23 PROCEDURE — 83036 HEMOGLOBIN GLYCOSYLATED A1C: CPT

## 2020-07-23 PROCEDURE — 82570 ASSAY OF URINE CREATININE: CPT | Performed by: INTERNAL MEDICINE

## 2020-07-24 LAB
ALBUMIN UR-MCNC: <1.2 MG/DL
CREAT UR-MCNC: 30.3 MG/DL
MICROALBUMIN/CREAT UR: NORMAL MG/G{CREAT}

## 2020-07-28 ENCOUNTER — OFFICE VISIT (OUTPATIENT)
Dept: INTERNAL MEDICINE | Facility: CLINIC | Age: 70
End: 2020-07-28

## 2020-07-28 VITALS
BODY MASS INDEX: 28.03 KG/M2 | HEIGHT: 63 IN | TEMPERATURE: 97.5 F | OXYGEN SATURATION: 98 % | RESPIRATION RATE: 17 BRPM | WEIGHT: 158.2 LBS | DIASTOLIC BLOOD PRESSURE: 83 MMHG | HEART RATE: 75 BPM | SYSTOLIC BLOOD PRESSURE: 152 MMHG

## 2020-07-28 DIAGNOSIS — I63.9 CEREBROVASCULAR ACCIDENT (CVA), UNSPECIFIED MECHANISM (HCC): ICD-10-CM

## 2020-07-28 DIAGNOSIS — E78.2 MIXED HYPERLIPIDEMIA: ICD-10-CM

## 2020-07-28 DIAGNOSIS — Z86.12 HISTORY OF POLIOMYELITIS: ICD-10-CM

## 2020-07-28 DIAGNOSIS — I10 ESSENTIAL HYPERTENSION: ICD-10-CM

## 2020-07-28 DIAGNOSIS — E11.59 TYPE 2 DIABETES MELLITUS WITH OTHER CIRCULATORY COMPLICATION, WITHOUT LONG-TERM CURRENT USE OF INSULIN (HCC): Primary | ICD-10-CM

## 2020-07-28 PROCEDURE — 99214 OFFICE O/P EST MOD 30 MIN: CPT | Performed by: INTERNAL MEDICINE

## 2020-07-28 RX ORDER — ATORVASTATIN CALCIUM 40 MG/1
40 TABLET, FILM COATED ORAL EVERY OTHER DAY
Qty: 45 TABLET | Refills: 1 | Status: SHIPPED | OUTPATIENT
Start: 2020-07-28 | End: 2021-03-09

## 2020-07-28 NOTE — PROGRESS NOTES
Chief Complaint   Patient presents with   • Follow-up   • Diabetes         History:  Rosalba Churchill is a 70 y.o. female who presents today for evaluation of the above problems.    Here for 3-month follow-up on her diabetes, hypertension hyperlipidemia and stroke.  Her A1c today is 7.2.  At home her blood sugar has been up as well.  Due to COVID-19 she has been gaining weight due to eating more sweets and she is not exercising.  She feels like she is getting weaker overall due to the lack of exercise.  Her diet has been poor.    Her blood pressure has been pretty well controlled at home but is elevated here in the office.  She says it is very reactive with a any type of stress for anxiety.    Denies any new neurologic complaints    She has been taking Lipitor every other day without any myalgias.  Her LDL was slightly above our goal of less than 70.    She has been taking aspirin 81 mg over-the-counter    Diabetes   Associated symptoms include weakness. Pertinent negatives for diabetes include no chest pain, no fatigue, no polydipsia and no polyuria.       ROS:  Review of Systems   Constitutional: Negative for activity change, fatigue and fever.   HENT: Negative.  Negative for congestion and trouble swallowing.    Respiratory: Negative for cough and shortness of breath.    Cardiovascular: Negative for chest pain, palpitations and leg swelling.   Gastrointestinal: Negative.    Endocrine: Negative for polydipsia and polyuria.   Genitourinary: Negative.    Musculoskeletal: Positive for arthralgias (Chronic right shoulder) and gait problem.   Neurological: Positive for weakness.   Hematological: Negative.    Psychiatric/Behavioral: Negative.        Allergies   Allergen Reactions   • Erythromycin Unknown (See Comments)   • Ibuprofen Unknown (See Comments)   • Moxifloxacin Unknown (See Comments)         Current Outpatient Medications:   •  amLODIPine (NORVASC) 10 MG tablet, Take 1 tablet by mouth Daily., Disp: 90  "tablet, Rfl: 3  •  aspirin 81 MG EC tablet, Take 1 tablet by mouth Daily., Disp: 30 tablet, Rfl: 5  •  atorvastatin (LIPITOR) 40 MG tablet, Take 1 tablet by mouth Every Other Day., Disp: 45 tablet, Rfl: 1  •  lisinopril (PRINIVIL,ZESTRIL) 20 MG tablet, Take 1 tablet by mouth 2 (Two) Times a Day., Disp: 60 tablet, Rfl: 5  •  metFORMIN (GLUCOPHAGE) 500 MG tablet, TAKE 2 TABLETS BY MOUTH 2 (TWO) TIMES A DAY WITH MEALS., Disp: 360 tablet, Rfl: 1  •  sertraline (ZOLOFT) 100 MG tablet, TAKE 1 TABLET BY MOUTH EVERY DAY, Disp: 30 tablet, Rfl: 5    OBJECTIVE:  Visit Vitals  /83 (BP Location: Left arm, Patient Position: Sitting, Cuff Size: Adult)   Pulse 75   Temp 97.5 °F (36.4 °C) (Oral)   Resp 17   Ht 160 cm (62.99\")   Wt 71.8 kg (158 lb 3.2 oz)   SpO2 98%   BMI 28.03 kg/m²      Physical Exam   Constitutional: She appears well-developed and well-nourished. No distress.   HENT:   Head: Normocephalic and atraumatic.   Right Ear: External ear normal.   Left Ear: External ear normal.   Eyes: Pupils are equal, round, and reactive to light. EOM are normal.   Neck: Phonation normal. No thyromegaly present.   Cardiovascular: Normal rate.   Pulmonary/Chest: No respiratory distress.   Neurological: She is alert.   Skin: She is not diaphoretic. No erythema. No pallor.   Psychiatric: She has a normal mood and affect. Her behavior is normal. Judgment and thought content normal.   Vitals reviewed.      Assessment/Plan    Rosalba was seen today for follow-up and diabetes.    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    Cerebrovascular accident (CVA), unspecified mechanism (CMS/HCC)  -     Ambulatory Referral to Physical Therapy " Evaluate and treat    History of poliomyelitis  -     Ambulatory Referral to Physical Therapy Evaluate and treat    Her A1c has increased to 7.2 from 6.9 in January.  She has gained weight due to not eating well and not exercising.  I discussed treatment options which include additional medication.  After discussion we will try lifestyle modifications with weight loss and exercise as well as tighter control of her diet.  If this is not successful in decreasing her A1c I will add additional medication    Her blood pressure is also elevated but this should improve with above measures to control her diabetes.  Continue current antihypertensives    Her LDL is 78 and with her prior stroke history goal is less than 70.  For now, continue taking Lipitor 40 mg every other day as she is tolerating it without myalgias.  However, if her LDL continues to be above our goal would like to take this daily.  At last visit I did stop her Zetia.  Consider restarting if her cholesterol still high.    Check A1c and lipid panel couple days prior to the next visit.  4-month follow-up for Medicare wellness.    Education materials and an After Visit Summary were printed and given to the patient at discharge.    Return in about 4 months (around 11/28/2020) for Medicare Wellness and A1c.      Patient's Body mass index is 28.03 kg/m². BMI is above normal parameters. Recommendations include: exercise counseling and nutrition counseling.      TALIA Lovett MD   11:48 AM  7/28/2020

## 2020-08-19 ENCOUNTER — TREATMENT (OUTPATIENT)
Dept: PHYSICAL THERAPY | Facility: CLINIC | Age: 70
End: 2020-08-19

## 2020-08-19 DIAGNOSIS — Z74.09 IMPAIRED FUNCTIONAL MOBILITY, BALANCE, GAIT, AND ENDURANCE: Primary | ICD-10-CM

## 2020-08-19 DIAGNOSIS — I63.9 CEREBROVASCULAR ACCIDENT (CVA), UNSPECIFIED MECHANISM (HCC): ICD-10-CM

## 2020-08-19 DIAGNOSIS — Z86.12 HISTORY OF POLIOMYELITIS: ICD-10-CM

## 2020-08-19 PROCEDURE — 97162 PT EVAL MOD COMPLEX 30 MIN: CPT | Performed by: PHYSICAL THERAPIST

## 2020-08-19 NOTE — PROGRESS NOTES
.Outpatient Physical Therapy Neuro Initial Evaluation       Patient Name: Rosalba Churchill  : 1950  MRN: [id number]  Today's Date: 2020      Visit Date: 2020    Patient Active Problem List   Diagnosis   • Essential hypertension   • Hyperlipidemia   • Type 2 diabetes mellitus with circulatory disorder, without long-term current use of insulin (CMS/HCC)   • Stroke (CMS/HCC)   • History of poliomyelitis        Past Medical History:   Diagnosis Date   • Anxiety    • Arthritis    • Depression    • Diabetes mellitus (CMS/HCC)    • Hyperlipidemia    • Hypertension    • Polio    • Stroke (CMS/HCC)         Past Surgical History:   Procedure Laterality Date   • ANKLE FUSION Right    • CERVICAL SPINE SURGERY     •  SECTION           Visit Dx:     ICD-10-CM ICD-9-CM   1. Impaired functional mobility, balance, gait, and endurance Z74.09 V49.89   2. Cerebrovascular accident (CVA), unspecified mechanism (CMS/HCC) I63.9 434.91   3. History of poliomyelitis Z86.12 V12.02       Patient History     Row Name 20 1300             History    Chief Complaint  Balance Problems;Difficulty Walking  -WJ      Brief Description of Current Complaint  Pt reports that she would like to build up her strength and address her balance impairments. 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 She reports a medical history that consist of polio. She wears a R knee brace to assist in her mobility. She notes that her most recent decline has been in the past year or so. 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  -WJ      Previous treatment for THIS PROBLEM  Rehabilitation  -WJ      Patient/Caregiver Goals  Improve mobility  -WJ      Hand Dominance  right-handed  -WJ      Occupation/sports/leisure activities  retired  -WJ      Related/Recent Hospitalizations  No  -WJ         Fall Risk Assessment    Any falls in the past year:  Yes  -WJ      Number of falls reported in the last  12 months  3  -WJ      Factors that contributed to the fall:  Lost balance  -WJ         Daily Activities    Pt Participated in POC and Goals  Yes  -WJ        User Key  (r) = Recorded By, (t) = Taken By, (c) = Cosigned By    Initials Name Provider Type    WJulio Botello, PT DPT Physical Therapist            PT Ortho     Row Name 08/19/20 1300       Quarter Clearing    Quarter Clearing  Upper Quarter Clearing;Lower Quarter Clearing  -WJ       DTR- Upper Quarter Clearing    Biceps (C5/6)  Right:;2- Normal response;Left:;1- Minimal response  -WJ    Brachioradialis (C6)  Bilateral:;1- Minimal response  -WJ    Triceps (C7)  Bilateral:;1- Minimal response  -WJ       Sensory Screen for Light Touch- Upper Quarter Clearing    C4 (posterior shoulder)  Bilateral:;Intact  -WJ    C5 (lateral upper arm)  Bilateral:;Intact  -WJ    C6 (tip of thumb)  Bilateral:;Intact  -WJ    C7 (tip of 3rd finger)  Bilateral:;Intact  -WJ    C8 (tip of 5th finger)  Bilateral:;Intact  -WJ    T1 (medial lower arm)  Bilateral:;Intact  -WJ       Myotomal Screen- Upper Quarter Clearing    Shoulder flexion (C5)  Right:;4+ (Good +);Left:;5 (Normal)  -WJ    Elbow flexion/wrist extension (C6)  Bilateral:;5 (Normal)  -WJ    Elbow extension/wrist flexion (C7)  Bilateral:;5 (Normal)  -WJ    Finger flexion/ (C8)  Bilateral:;WNL  -WJ       DTR- Lower Quarter Clearing    Patellar tendon (L2-4)  Right:;Unable to assess;Left:;0- No response  -WJ    Achilles tendon (S1-2)  Right:;Unable to assess;Left:;1- Minimal response R ankle fused  -WJ       Sensory Screen for Light Touch- Lower Quarter Clearing    L1 (inguinal area)  Bilateral:;Intact  -WJ    L2 (anterior mid thigh)  Bilateral:;Intact  -WJ    L3 (distal anterior thigh)  Bilateral:;Intact  -WJ    L4 (medial lower leg/foot)  Bilateral:;Intact  -WJ    L5 (lateral lower leg/great toe)  Right:;Diminished  -WJ       Myotomal Screen- Lower Quarter Clearing    Hip flexion (L2)  Right:;2+ (Poor +);Left:;3+ (Fair  +)  -WJ    Knee extension (L3)  Right:;1 (Trace);Left:;5 (Normal)  -WJ    Ankle DF (L4)  Left:;5 (Normal)  -WJ    Ankle PF (S1)  Left:;5 (Normal)  -WJ    Knee flexion (S2)  Left:;5 (Normal)  -WJ       MMT (Manual Muscle Testing)    Rt Lower Ext  Rt Hip ABduction  -WJ    Lt Lower Ext  Lt Hip ABduction  -WJ       MMT Right Lower Ext    Rt Hip ABduction MMT, Gross Movement  (2+/5) poor plus  -WJ       MMT Left Lower Ext    Lt Hip ABduction MMT, Gross Movement  (3+/5) fair plus  -WJ      User Key  (r) = Recorded By, (t) = Taken By, (c) = Cosigned By    Initials Name Provider Type    Julio Carter PT DPT Physical Therapist        PT Neuro     Row Name 08/19/20 1300             Cognition    Overall Cognitive Status  WFL  -WJ      Orientation Level  Oriented X4  -WJ         Coordination    Coordination Observations  Diadochokinesis  -WJ      Diadochokinesis  No  -WJ      Coordination Tests  Rapid Alternating;Finger to nose eyes open;Finger to nose eyes closed;Dexterity  -WJ      Rapid Alternating  Intact  -WJ      Finger to Nose Eyes Open  Intact  -WJ      Finger to Nose Eyes Closed  Intact  -WJ      Dexterity  Intact  -WJ         Tone    Tone Comments  History of Polio which effected her R LE, she wears a brace and only has trace contraction  -WJ         Gait/Stairs Assessment/Training    Comment (Gait/Stairs)  Pt ambulates with a R knee brace as a result of polio, she has an antalgic gait with RLE in extension due to the brace and the a fused ankle.   -WJ         Balance Skills Training    Rhomberg  Eo/EC >30 sec  -WJ      Sharpened Rhomberg  <5 sec bilateral   -WJ        User Key  (r) = Recorded By, (t) = Taken By, (c) = Cosigned By    Initials Name Provider Type    Julio Carter PT DPT Physical Therapist                  Therapy Education  Education Details: Edcuated pt on anatomy, PT POC and HEP. Access Code: [email or web address]  Given: HEP, Symptoms/condition management, Posture/body mechanics, Fall prevention and  home safety, Mobility training  Program: New  How Provided: Verbal, Demonstration, Written  Provided to: Patient  Level of Understanding: Verbalized, Demonstrated    PT OP Goals     Row Name 08/19/20 1600          Long Term Goals    LTG Date to Achieve  09/30/20  -WJ     LTG 1  Pt to demonstrate independence with comprehensive home exercise program.  -WJ     LTG 1 Progress  New  -WJ     LTG 2  Pt to demonstrate gross LLE strength of 4+/5.  -WJ     LTG 2 Progress  New  -WJ     LTG 3  Pt is able to complete Rhomberg stance for >15 seconds.  -WJ     LTG 3 Progress  New  -WJ     LTG 4  Pt is able to ambulate 350 ft without fatigue and decreased fear of falling.  -WJ     LTG 4 Progress  New  -WJ     LTG 5  Pt to complete TUG in < or = 11 seconds.  -WJ     LTG 5 Progress  New  -WJ        Time Calculation    PT Goal Re-Cert Due Date  09/18/20  -WJ       User Key  (r) = Recorded By, (t) = Taken By, (c) = Cosigned By    Initials Name Provider Type    WJ Julio Logan, PT DPT Physical Therapist          PT Assessment/Plan     Row Name 08/19/20 1600          PT Assessment    Functional Limitations  Decreased safety during functional activities;Impaired gait;Impaired locomotion;Limitation in home management;Limitations in community activities;Limitations in functional capacity and performance;Performance in leisure activities  -W     Impairments  Balance;Endurance;Gait;Impaired muscle endurance;Muscle strength;Locomotion;Posture  -WJ     Assessment Comments  Ms. Churchill presents to the clinic today with a chief complaint impaired balance and decreased strength. She suffered from a CVA in the brainstem approx 2 years ago along with having a medical history that includes poilio that has effected her RLE. She reports that her mobility, balance and strength have declined more in the past year. She uses a knee brace for the RLE and the ankle has been fused which effects her gait pattern and contributes to her balance impairment.  She has very little muscle on the RLE with trace contractions present except for the hips which she dmeonstrates 2-3/5 strength. She also demonstrates weakness in the L hip abductors. This weakness effects her balance as her hip are not as stable. Her coordination was good when tested. Skilled physcial therapy is indicated to address her strength, balance and mobility and increase her functional mobility. Thank you for this referral.  -WJ     Rehab Potential  Good  -WJ     Patient/caregiver participated in establishment of treatment plan and goals  Yes  -WJ     Patient would benefit from skilled therapy intervention  Yes  -WJ        PT Plan    PT Frequency  2x/week  -WJ     Predicted Duration of Therapy Intervention (Therapy Eval)  4-6 weeks  -WJ     Planned CPT's?  PT EVAL MOD COMPLELITY: 67625;PT RE-EVAL: 12250;PT THER PROC EA 15 MIN: 90593;PT THER ACT EA 15 MIN: 83438;PT MANUAL THERAPY EA 15 MIN: 52830;PT NEUROMUSC RE-EDUCATION EA 15 MIN: 70636;PT GAIT TRAINING EA 15 MIN: 74584;PT SELF CARE/HOME MGMT/TRAIN EA 15: 96524;PT ELECTRICAL STIM UNATTEND: ;PT ELECTRICAL STIM ATTD EA 15 MIN: 99857;PT HOT/COLD PACK WC NONMCARE: 68150  -WJ     PT Plan Comments  We will work on her strength, balance and mobility. She wants an exercise regime that she can perform on her own and inquired about exercises she could potnetially do at the gym. We will start with her balance here in the clinic and build an HEP revolved around strengthening working towards independence upon discharge.  -WJ       User Key  (r) = Recorded By, (t) = Taken By, (c) = Cosigned By    Initials Name Provider Type    WJulio Botello, PT DPT Physical Therapist                             Outcome Measure Options: Timed Up and Go (TUG)  Timed Up and Go (TUG)  TUG Test 1: 15.23 seconds  TUG Test 2: 14.61 seconds    Time Calculation:           PT G-Leonarda  Outcome Measure Options: Timed Up and Go (TUG)  TUG Test 1: 15.23 seconds  TUG Test 2: 14.61 seconds          Julio Logan, PT DPT  8/19/2020

## 2020-08-21 ENCOUNTER — TREATMENT (OUTPATIENT)
Dept: PHYSICAL THERAPY | Facility: CLINIC | Age: 70
End: 2020-08-21

## 2020-08-21 DIAGNOSIS — Z86.12 HISTORY OF POLIOMYELITIS: ICD-10-CM

## 2020-08-21 DIAGNOSIS — Z74.09 IMPAIRED FUNCTIONAL MOBILITY, BALANCE, GAIT, AND ENDURANCE: Primary | ICD-10-CM

## 2020-08-21 DIAGNOSIS — I63.9 CEREBROVASCULAR ACCIDENT (CVA), UNSPECIFIED MECHANISM (HCC): ICD-10-CM

## 2020-08-21 PROCEDURE — 97530 THERAPEUTIC ACTIVITIES: CPT | Performed by: PHYSICAL THERAPIST

## 2020-08-21 PROCEDURE — 97110 THERAPEUTIC EXERCISES: CPT | Performed by: PHYSICAL THERAPIST

## 2020-08-21 NOTE — PROGRESS NOTES
Outpatient Physical Therapy Neuro Treatment Note       Patient Name: Rosalba Churchill  : 1950  MRN: [id number]  Today's Date: 2020      Visit Date: 2020    Visit Dx:    ICD-10-CM ICD-9-CM   1. Impaired functional mobility, balance, gait, and endurance Z74.09 V49.89   2. Cerebrovascular accident (CVA), unspecified mechanism (CMS/[facility]) I63.9 434.91   3. History of poliomyelitis Z86.12 V12.02       Patient Active Problem List   Diagnosis   • Essential hypertension   • Hyperlipidemia   • Type 2 diabetes mellitus with circulatory disorder, without long-term current use of insulin (CMS/[facility])   • Stroke (CMS/[facility])   • History of poliomyelitis                       PT Assessment/Plan     Row Name 20 1030          PT Assessment    Assessment Comments  Today is Ms. Martin first visit since inital evaluation therefore no goals have been achieved at this time. Today we focused on Hip strengthening and balance. She requires cues for a slower pace and to perform exercises with good form. With cues to slow down she can perform exercises with good form and pace but did fatigue quicker.   -TR        PT Plan    PT Plan Comments  Continue to work on BLE strengthening and balance.   -TR       User Key  (r) = Recorded By, (t) = Taken By, (c) = Cosigned By    Initials Name Provider Type    TR Kayli Nicole, PTA Physical Therapy Assistant             OP Exercises     Row Name 20 1030             Subjective Comments    Subjective Comments  Pt denies changes and reports compliance with HEP components given on Evaluation.   -TR         Subjective Pain    Able to rate subjective pain?  yes  -TR      Pre-Treatment Pain Level  5  -TR      Subjective Pain Comment  R shoulder   -TR         Total Minutes    03262 - PT Therapeutic Exercise Minutes  25  -TR      22185 - PT Therapeutic Activity Minutes  20  -TR         Exercise 1    Exercise Name 1  R sidelying L clamshells   -TR      Cueing 1  Verbal  -TR       Sets 1  2  -TR      Reps 1  10  -TR      Additional Comments  reviewed for HEP   -TR         Exercise 2    Exercise Name 2  Bridges   -TR      Cueing 2  Verbal  -TR      Sets 2  2  -TR      Reps 2  10  -TR      Additional Comments  Reviewed HEP   -TR         Exercise 3    Exercise Name 3  HL clamshells LLE against red band   -TR      Cueing 3  Verbal  -TR      Sets 3  2  -TR      Reps 3  10  -TR         Exercise 4    Exercise Name 4  HL BKFO RLE min assist   -TR      Cueing 4  Verbal  -TR      Sets 4  1  -TR      Reps 4  10  -TR         Exercise 5    Exercise Name 5  Seated Marching BLE   -TR      Cueing 5  Verbal;Demo  -TR      Sets 5  1  -TR      Reps 5  10  -TR      Additional Comments  reviewed for HEP.   -TR         Exercise 6    Exercise Name 6  NBOS EO EC  -TR      Cueing 6  Verbal  -TR      Sets 6  3  -TR      Time 6  30seconds   -TR         Exercise 7    Exercise Name 7  Tandem stance   -TR      Cueing 7  Verbal;Tactile  -TR      Sets 7  2 sets each way   -TR      Time 7  30 seconds   -TR         Exercise 8    Exercise Name 8  Wobble board A/P, M/l   -TR      Cueing 8  Verbal;Tactile  -TR      Additional Comments  EO/EC  -TR         Exercise 9    Exercise Name 9  TUG test x5   -TR      Cueing 9  Verbal;Tactile  -TR      Additional Comments  16, 12,12,13,12 seconds   -TR        User Key  (r) = Recorded By, (t) = Taken By, (c) = Cosigned By    Initials Name Provider Type    Kayli Galan, PTA Physical Therapy Assistant                      PT OP Goals     Row Name 08/21/20 1030          Long Term Goals    LTG Date to Achieve  09/30/20  -TR     LTG 1  Pt to demonstrate independence with comprehensive home exercise program.  -TR     LTG 1 Progress  Ongoing  -TR     LTG 2  Pt to demonstrate gross LLE strength of 4+/5.  -TR     LTG 2 Progress  Ongoing  -TR     LTG 3  Pt is able to complete Rhomberg stance for >15 seconds.  -TR     LTG 3 Progress  Ongoing  -TR     LTG 4  Pt is able to ambulate 350 ft  without fatigue and decreased fear of falling.  -TR     LTG 4 Progress  Ongoing  -TR     LTG 5  Pt to complete TUG in < or = 11 seconds.  -TR     LTG 5 Progress  Ongoing  -TR        Time Calculation    PT Goal Re-Cert Due Date  09/18/20  -TR       User Key  (r) = Recorded By, (t) = Taken By, (c) = Cosigned By    Initials Name Provider Type    Kayli Galan PTA Physical Therapy Assistant          Therapy Education  Education Details: Posture with gait and BKFO with RLE   Given: Symptoms/condition management              Time Calculation:   Total Timed Code Minutes- PT: 45 minute(s)                 Kayli Nicole PTA  8/21/2020

## 2020-08-26 ENCOUNTER — TREATMENT (OUTPATIENT)
Dept: PHYSICAL THERAPY | Facility: CLINIC | Age: 70
End: 2020-08-26

## 2020-08-26 DIAGNOSIS — I63.9 CEREBROVASCULAR ACCIDENT (CVA), UNSPECIFIED MECHANISM (HCC): ICD-10-CM

## 2020-08-26 DIAGNOSIS — Z74.09 IMPAIRED FUNCTIONAL MOBILITY, BALANCE, GAIT, AND ENDURANCE: Primary | ICD-10-CM

## 2020-08-26 DIAGNOSIS — Z86.12 HISTORY OF POLIOMYELITIS: ICD-10-CM

## 2020-08-26 PROCEDURE — 97112 NEUROMUSCULAR REEDUCATION: CPT | Performed by: PHYSICAL THERAPIST

## 2020-08-26 NOTE — PROGRESS NOTES
Outpatient Physical Therapy Neuro Treatment Note       Patient Name: Rosalba Churchill  : 1950  MRN: [id number]  Today's Date: 2020      Visit Date: 2020    Visit Dx:    ICD-10-CM ICD-9-CM   1. Impaired functional mobility, balance, gait, and endurance Z74.09 V49.89   2. Cerebrovascular accident (CVA), unspecified mechanism (CMS/[facility]) I63.9 434.91   3. History of poliomyelitis Z86.12 V12.02       Patient Active Problem List   Diagnosis   • Essential hypertension   • Hyperlipidemia   • Type 2 diabetes mellitus with circulatory disorder, without long-term current use of insulin (CMS/[facility])   • Stroke (CMS/[facility])   • History of poliomyelitis                       PT Assessment/Plan     Row Name 20 1000          PT Assessment    Assessment Comments  With the Neurocom testing today as time passed she became more proficient with maintaining midline and R WS'ing  -EC        PT Plan    PT Plan Comments  Continue to work on WS'ing perhaps on wobble board.  -EC       User Key  (r) = Recorded By, (t) = Taken By, (c) = Cosigned By    Initials Name Provider Type    EC Rai Simms PTA Physical Therapy Assistant             OP Exercises     Row Name 20 0900             Subjective Comments    Subjective Comments  She c/o B shoulder pain R more than L. No recent falls in the last couple of weeks.   -EC         Subjective Pain    Able to rate subjective pain?  yes  -EC      Pre-Treatment Pain Level  5  -EC      Post-Treatment Pain Level  5  -EC         Total Minutes    78770 -  PT Neuromuscular Reeducation Minutes  45  -EC         Exercise 1    Exercise Name 1  Limits of Stability and Rythmic WS'ing tests on Neurocom  -EC         Exercise 2    Exercise Name 2  WS'ing with metronome progressed from 50 to 70 b.p.m. incrementally  -EC         Exercise 3    Exercise Name 3  walking stepping with metronome progressed from 50 to 70 b.p.m  -EC        User Key  (r) = Recorded By, (t) = Taken By, (c) =  Cosigned By    Initials Name Provider Type    Rai Banks PTA Physical Therapy Assistant                      PT OP Goals     Row Name 08/26/20 1000          Long Term Goals    LTG Date to Achieve  09/30/20  -EC     LTG 1  Pt to demonstrate independence with comprehensive home exercise program.  -EC     LTG 1 Progress  Ongoing  -EC     LTG 1 Progress Comments  advised on using metronome at home  -EC     LTG 2  Pt to demonstrate gross LLE strength of 4+/5.  -EC     LTG 2 Progress  Ongoing  -EC     LTG 3  Pt is able to complete Rhomberg stance for >15 seconds.  -EC     LTG 3 Progress  Ongoing  -EC     LTG 4  Pt is able to ambulate 350 ft without fatigue and decreased fear of falling.  -EC     LTG 4 Progress  Ongoing  -EC     LTG 5  Pt to complete TUG in < or = 11 seconds.  -EC     LTG 5 Progress  Ongoing  -EC        Time Calculation    PT Goal Re-Cert Due Date  09/18/20  -EC       User Key  (r) = Recorded By, (t) = Taken By, (c) = Cosigned By    Initials Name Provider Type    Rai Banks PTA Physical Therapy Assistant          Therapy Education  Education Details: WS'ing and walking with metronome at home  Given: Symptoms/condition management, Posture/body mechanics, Fall prevention and home safety, Mobility training  How Provided: Verbal, Demonstration  Provided to: Patient  Level of Understanding: Verbalized, Demonstrated              Time Calculation:                     Rai Simms PTA  8/26/2020

## 2020-08-28 ENCOUNTER — TREATMENT (OUTPATIENT)
Dept: PHYSICAL THERAPY | Facility: CLINIC | Age: 70
End: 2020-08-28

## 2020-08-28 DIAGNOSIS — Z74.09 IMPAIRED FUNCTIONAL MOBILITY, BALANCE, GAIT, AND ENDURANCE: Primary | ICD-10-CM

## 2020-08-28 DIAGNOSIS — I63.9 CEREBROVASCULAR ACCIDENT (CVA), UNSPECIFIED MECHANISM (HCC): ICD-10-CM

## 2020-08-28 DIAGNOSIS — Z86.12 HISTORY OF POLIOMYELITIS: ICD-10-CM

## 2020-08-28 PROCEDURE — 97112 NEUROMUSCULAR REEDUCATION: CPT | Performed by: PHYSICAL THERAPIST

## 2020-08-28 PROCEDURE — 97110 THERAPEUTIC EXERCISES: CPT | Performed by: PHYSICAL THERAPIST

## 2020-08-28 NOTE — PROGRESS NOTES
Outpatient Physical Therapy Ortho Treatment Note       Patient Name: Rosalba Churchill  : 1950  MRN: [id number]  Today's Date: 2020      Visit Date: 2020    Visit Dx:    ICD-10-CM ICD-9-CM   1. Impaired functional mobility, balance, gait, and endurance Z74.09 V49.89   2. Cerebrovascular accident (CVA), unspecified mechanism (CMS/[facility]) I63.9 434.91   3. History of poliomyelitis Z86.12 V12.02       Patient Active Problem List   Diagnosis   • Essential hypertension   • Hyperlipidemia   • Type 2 diabetes mellitus with circulatory disorder, without long-term current use of insulin (CMS/[facility])   • Stroke (CMS/[facility])   • History of poliomyelitis        Past Medical History:   Diagnosis Date   • Anxiety    • Arthritis    • Depression    • Diabetes mellitus (CMS/[facility])    • Hyperlipidemia    • Hypertension    • Polio    • Stroke (CMS/[facility])         Past Surgical History:   Procedure Laterality Date   • ANKLE FUSION Right    • CERVICAL SPINE SURGERY     •  SECTION                         PT Assessment/Plan     Row Name 20 1028          PT Assessment    Assessment Comments  Today we continued to focus on strengthening and balance. She is already demonstrating significant improvements in her weightshifting and balance in three sessions.   -TR        PT Plan    PT Plan Comments  Contintue to challange her balance.   -TR       User Key  (r) = Recorded By, (t) = Taken By, (c) = Cosigned By    Initials Name Provider Type    TR Kayli Nicole PTA Physical Therapy Assistant            OP Exercises     Row Name 20 1028             Subjective Comments    Subjective Comments  Pt denies any changes reports continued shoulder pain.   -TR         Subjective Pain    Able to rate subjective pain?  yes  -TR      Pre-Treatment Pain Level  5  -TR      Subjective Pain Comment  R shoulder   -TR         Total Minutes    87406 - PT Therapeutic Exercise Minutes  20  -TR      20101 -  PT Neuromuscular  Reeducation Minutes  25  -TR         Exercise 1    Exercise Name 1  Briidges with LE's on 55cm SB  -TR      Cueing 1  Verbal  -TR      Sets 1  2  -TR      Reps 1  10  -TR         Exercise 2    Exercise Name 2  HL BKFO LLE with red band   -TR      Cueing 2  Verbal  -TR      Sets 2  2  -TR      Reps 2  10  -TR      Additional Comments  assist to hold RLE   -TR         Exercise 3    Exercise Name 3  HL BKFO RLE with red band   -TR      Cueing 3  Verbal  -TR      Sets 3  2  -TR      Reps 3  10  -TR         Exercise 4    Exercise Name 4  HL marching with TA contractoin   -TR      Cueing 4  Verbal  -TR      Sets 4  2  -TR      Reps 4  10  -TR         Exercise 5    Exercise Name 5  Shuttle press BLE   -TR      Cueing 5  Verbal  -TR      Sets 5  2  -TR      Reps 5  20  -TR      Additional Comments  5 cords   -TR         Exercise 6    Exercise Name 6  Shuttle press once cord   -TR      Cueing 6  Verbal  -TR      Sets 6  1  -TR      Reps 6  10  -TR      Additional Comments  min assist   -TR         Exercise 7    Exercise Name 7  Wobble board A/P, M/L   -TR      Cueing 7  Verbal  -TR      Additional Comments  WEIGHTSHIFTING   -TR         Exercise 8    Exercise Name 8  Wobble board AP, M/L maintain midline with EO EC   -TR      Cueing 8  Verbal  -TR         Exercise 9    Exercise Name 9  Step ups/downs 4 inch step no UE assist   -TR      Cueing 9  Verbal  -TR      Sets 9  1  -TR      Reps 9  10  -TR      Time 9  each   -TR         Exercise 10    Exercise Name 10  Tandem on foam ball toss at trampolin ewith 45 cm SB   -TR      Cueing 10  Verbal  -TR      Sets 10  2  -TR      Reps 10  10  -TR      Time 10  0  -TR        User Key  (r) = Recorded By, (t) = Taken By, (c) = Cosigned By    Initials Name Provider Type    TR Kayli Nicole PTA Physical Therapy Assistant                       PT OP Goals     Row Name 08/28/20 1028          Long Term Goals    LTG Date to Achieve  09/30/20  -TR     LTG 1  Pt to demonstrate  independence with comprehensive home exercise program.  -TR     LTG 1 Progress  Ongoing  -TR     LTG 2  Pt to demonstrate gross LLE strength of 4+/5.  -TR     LTG 2 Progress  Ongoing  -TR     LTG 2 Progress Comments  Utilized shuttle press today   -TR     LTG 3  Pt is able to complete Rhomberg stance for >15 seconds.  -TR     LTG 3 Progress  Ongoing  -TR     LTG 4  Pt is able to ambulate 350 ft without fatigue and decreased fear of falling.  -TR     LTG 4 Progress  Ongoing  -TR     LTG 5  Pt to complete TUG in < or = 11 seconds.  -TR     LTG 5 Progress  Ongoing  -TR        Time Calculation    PT Goal Re-Cert Due Date  09/18/20  -TR       User Key  (r) = Recorded By, (t) = Taken By, (c) = Cosigned By    Initials Name Provider Type    Kayli Galan PTA Physical Therapy Assistant          Therapy Education  Given: HEP  Program: Reinforced  How Provided: Verbal  Provided to: Patient  Level of Understanding: Verbalized              Time Calculation:   Total Timed Code Minutes- PT: 45 minute(s)                Kayli Nicole PTA  8/28/2020

## 2020-09-02 ENCOUNTER — TELEPHONE (OUTPATIENT)
Dept: ORTHOPEDICS | Facility: OTHER | Age: 70
End: 2020-09-02

## 2020-09-04 ENCOUNTER — TREATMENT (OUTPATIENT)
Dept: PHYSICAL THERAPY | Facility: CLINIC | Age: 70
End: 2020-09-04

## 2020-09-04 DIAGNOSIS — I63.9 CEREBROVASCULAR ACCIDENT (CVA), UNSPECIFIED MECHANISM (HCC): ICD-10-CM

## 2020-09-04 DIAGNOSIS — I10 ESSENTIAL HYPERTENSION: ICD-10-CM

## 2020-09-04 DIAGNOSIS — Z74.09 IMPAIRED FUNCTIONAL MOBILITY, BALANCE, GAIT, AND ENDURANCE: Primary | ICD-10-CM

## 2020-09-04 DIAGNOSIS — Z86.12 HISTORY OF POLIOMYELITIS: ICD-10-CM

## 2020-09-04 PROCEDURE — 97110 THERAPEUTIC EXERCISES: CPT | Performed by: PHYSICAL THERAPIST

## 2020-09-04 RX ORDER — LISINOPRIL 20 MG/1
TABLET ORAL
Qty: 180 TABLET | Refills: 1 | Status: SHIPPED | OUTPATIENT
Start: 2020-09-04 | End: 2021-03-05

## 2020-09-04 NOTE — PROGRESS NOTES
Outpatient Physical Therapy Neuro Treatment Note       Patient Name: Rosalba Churchill  : 1950  MRN: [id number]  Today's Date: 2020      Visit Date: 2020    Visit Dx:    ICD-10-CM ICD-9-CM   1. Impaired functional mobility, balance, gait, and endurance Z74.09 V49.89   2. Cerebrovascular accident (CVA), unspecified mechanism (CMS/[facility]) I63.9 434.91   3. History of poliomyelitis Z86.12 V12.02       Patient Active Problem List   Diagnosis   • Essential hypertension   • Hyperlipidemia   • Type 2 diabetes mellitus with circulatory disorder, without long-term current use of insulin (CMS/[facility])   • Stroke (CMS/[facility])   • History of poliomyelitis                       PT Assessment/Plan     Row Name 20 1100          PT Assessment    Assessment Comments  We progressed with more strengthening and balance activities in standing today. She had some fatigue that requried an occasional seated rest break. She had increased difficutly with A/P weight shifting but this improved with practice.  -WJ        PT Plan    PT Plan Comments  Continue to work on her strength and balance as symptoms allow.  -WJ       User Key  (r) = Recorded By, (t) = Taken By, (c) = Cosigned By    Initials Name Provider Type    WJ Julio Logan, PT DPT Physical Therapist             OP Exercises     Row Name 20 1028 20 1000          Subjective Comments    Subjective Comments  --  Pt reports that she is feeling better today.  -WJ        Subjective Pain    Able to rate subjective pain?  --  yes  -WJ     Pre-Treatment Pain Level  --  3  -WJ     Subjective Pain Comment  --  R shoulder  -WJ        Total Minutes    23831 - PT Therapeutic Exercise Minutes  45  -WJ  --        Exercise 1    Exercise Name 1  --  bridge with ER at peak GTB  -WJ     Cueing 1  --  Verbal  -WJ     Sets 1  --  2  -WJ     Reps 1  --  10  -WJ        Exercise 2    Exercise Name 2  --  L SLR with GTB   -WJ     Cueing 2  --  Verbal  -WJ     Sets 2  --  2  -WJ      Reps 2  --  10  -WJ        Exercise 3    Exercise Name 3  --  seated L LAQ with green can-do  -WJ     Cueing 3  --  Verbal;Tactile  -WJ     Sets 3  --  2  -WJ     Reps 3  --  10  -WJ        Exercise 4    Exercise Name 4  --  seated L hamstring curl  -WJ     Cueing 4  --  Verbal  -WJ     Sets 4  --  2  -WJ     Reps 4  --  10  -WJ     Additional Comments  --  green can-do  -WJ        Exercise 5    Exercise Name 5  --  standing hip abduction  -WJ     Cueing 5  --  Verbal;Tactile  -WJ     Sets 5  --  2  -WJ     Reps 5  --  10  -WJ        Exercise 6    Exercise Name 6  --  standing hip extension   -WJ     Cueing 6  --  Verbal  -WJ     Sets 6  --  2  -WJ     Reps 6  --  10  -WJ        Exercise 7    Exercise Name 7  --  sit<>stands  -WJ     Cueing 7  --  Verbal  -WJ     Sets 7  --  2  -WJ     Reps 7  --  5  -WJ        Exercise 8    Exercise Name 8  --  Wobble board A/P, M/L  -WJ     Reps 8  --  2  -WJ     Additional Comments  --  weightshifting  -WJ        Exercise 9    Exercise Name 9  --  RS on foam EO/EC  -WJ     Cueing 9  --  Verbal  -WJ     Time 9  --  30 sec  -WJ        Exercise 10    Exercise Name 10  --  tandem stance on foam  -WJ     Sets 10  --  2  -WJ     Time 10  --  30 sec each  -WJ     Additional Comments  --  EO/EC  -WJ        Exercise 11    Exercise Name 11  --  shuttle press  -WJ     Cueing 11  --  Verbal  -WJ     Time 11  --  3 min   -WJ     Additional Comments  --  5 bands with belt and ball on knees  -WJ       User Key  (r) = Recorded By, (t) = Taken By, (c) = Cosigned By    Initials Name Provider Type    WJulio Botello, PT DPT Physical Therapist                      PT OP Goals     Row Name 09/04/20 1028          Long Term Goals    LTG Date to Achieve  09/30/20  -WJ     LTG 1  Pt to demonstrate independence with comprehensive home exercise program.  -WJ     LTG 1 Progress  Ongoing  -WJ     LTG 2  Pt to demonstrate gross LLE strength of 4+/5.  -WJ     LTG 2 Progress  Ongoing  -WJ     LTG 3  Pt is  able to complete Rhomberg stance for >15 seconds.  -W     LTG 3 Progress  Ongoing  -W     LTG 4  Pt is able to ambulate 350 ft without fatigue and decreased fear of falling.  -W     LTG 4 Progress  Ongoing  -W     LTG 5  Pt to complete TUG in < or = 11 seconds.  -W     LTG 5 Progress  Ongoing  -W        Time Calculation    PT Goal Re-Cert Due Date  09/18/20  -       User Key  (r) = Recorded By, (t) = Taken By, (c) = Cosigned By    Initials Name Provider Type    Julio Carter, PT DPT Physical Therapist          Therapy Education  Education Details: standing hip abduction and extension   Given: HEP  Program: New  How Provided: Verbal  Provided to: Patient  Level of Understanding: Verbalized              Time Calculation:   Total Timed Code Minutes- PT: 45 minute(s)                 Julio Logan, PT DPT  9/4/2020

## 2020-09-09 ENCOUNTER — TREATMENT (OUTPATIENT)
Dept: PHYSICAL THERAPY | Facility: CLINIC | Age: 70
End: 2020-09-09

## 2020-09-09 DIAGNOSIS — Z86.12 HISTORY OF POLIOMYELITIS: ICD-10-CM

## 2020-09-09 DIAGNOSIS — I63.9 CEREBROVASCULAR ACCIDENT (CVA), UNSPECIFIED MECHANISM (HCC): ICD-10-CM

## 2020-09-09 DIAGNOSIS — Z74.09 IMPAIRED FUNCTIONAL MOBILITY, BALANCE, GAIT, AND ENDURANCE: Primary | ICD-10-CM

## 2020-09-09 PROCEDURE — 97112 NEUROMUSCULAR REEDUCATION: CPT | Performed by: PHYSICAL THERAPIST

## 2020-09-09 PROCEDURE — 97116 GAIT TRAINING THERAPY: CPT | Performed by: PHYSICAL THERAPIST

## 2020-09-09 NOTE — PROGRESS NOTES
Outpatient Physical Therapy Neuro Treatment Note       Patient Name: Rosalba Churchill  : 1950  MRN: [id number]  Today's Date: 2020      Visit Date: 2020    Visit Dx:    ICD-10-CM ICD-9-CM   1. Impaired functional mobility, balance, gait, and endurance Z74.09 V49.89   2. Cerebrovascular accident (CVA), unspecified mechanism (CMS/[facility]) I63.9 434.91   3. History of poliomyelitis Z86.12 V12.02       Patient Active Problem List   Diagnosis   • Essential hypertension   • Hyperlipidemia   • Type 2 diabetes mellitus with circulatory disorder, without long-term current use of insulin (CMS/[facility])   • Stroke (CMS/[facility])   • History of poliomyelitis                       PT Assessment/Plan     Row Name 20 1032          PT Assessment    Assessment Comments  Pt wanted to focus on her balance today so we did just that. She demonstarstes improvements in her sit to stands and overall stability; however when challanged with RIP  resitance for sit to stands and gait she struggled. She did well with out side ambulation as well with no LOB.   -TR        PT Plan    PT Plan Comments  Continue with balance training and finalizing her HEP. Address all goals for progress note and consider placing pt on hold.   -TR       User Key  (r) = Recorded By, (t) = Taken By, (c) = Cosigned By    Initials Name Provider Type    Kayli Galan, PTA Physical Therapy Assistant             OP Exercises     Row Name 20 1032             Subjective Comments    Subjective Comments  Pt reports feeling pretty good today. She reports compliance with new HEP components. She reports that she wants to be done following next session. She also wants to focus on her baalnce   -TR         Subjective Pain    Able to rate subjective pain?  yes  -TR      Pre-Treatment Pain Level  4  -TR      Subjective Pain Comment  R shoulder   -TR         Total Minutes    97573 - Gait Training Minutes   10  -TR      84593 - PT Therapeutic Exercise  Minutes  --  -TR      09373 -  PT Neuromuscular Reeducation Minutes  30  -TR         Exercise 1    Exercise Name 1  Sit to stands with wobble board A/P, M/L   -TR      Cueing 1  Verbal  -TR      Sets 1  1  -TR      Reps 1  5  -TR      Additional Comments  each direction   -TR         Exercise 2    Exercise Name 2  Sit to stands with RIP    -TR      Cueing 2  Verbal  -TR      Sets 2  2  -TR      Reps 2  5  -TR         Exercise 3    Exercise Name 3  Forward and retrograde ambulation   -TR      Cueing 3  Verbal  -TR      Sets 3  2  -TR      Time 3  40'  -TR      Additional Comments  each  -TR         Exercise 4    Exercise Name 4  Side lunges onto round BOSU   -TR      Cueing 4  Verbal  -TR      Sets 4  1  -TR      Reps 4  10  -TR      Additional Comments  LLE   -TR         Exercise 5    Exercise Name 5  Forward lunges LLE   -TR      Cueing 5  Verbal  -TR      Sets 5  1  -TR      Reps 5  10  -TR         Exercise 6    Exercise Name 6  Tandem stance ball toss at trampline   -TR         Exercise 7    Exercise Name 7  Gait training outisde, up and down curbs, inclines, and grassy hills.  -TR      Cueing 7  Verbal;Tactile  -TR      Time 7  10  -TR        User Key  (r) = Recorded By, (t) = Taken By, (c) = Cosigned By    Initials Name Provider Type    TR Kayli Nicole, PTA Physical Therapy Assistant                      PT OP Goals     Row Name 09/09/20 1032          Long Term Goals    LTG Date to Achieve  09/30/20  -TR     LTG 1  Pt to demonstrate independence with comprehensive home exercise program.  -TR     LTG 1 Progress  Ongoing  -TR     LTG 2  Pt to demonstrate gross LLE strength of 4+/5.  -TR     LTG 2 Progress  Ongoing  -TR     LTG 3  Pt is able to complete Rhomberg stance for >15 seconds.  -TR     LTG 3 Progress  Ongoing  -TR     LTG 4  Pt is able to ambulate 350 ft without fatigue and decreased fear of falling.  -TR     LTG 4 Progress  Ongoing  -TR     LTG 4 Progress Comments  some fatgue following  outside ambulation today   -TR     LTG 5  Pt to complete TUG in < or = 11 seconds.  -TR     LTG 5 Progress  Ongoing  -TR        Time Calculation    PT Goal Re-Cert Due Date  09/18/20  -TR       User Key  (r) = Recorded By, (t) = Taken By, (c) = Cosigned By    Initials Name Provider Type    Kayli Galan PTA Physical Therapy Assistant          Therapy Education  Given: HEP  Program: Reinforced  How Provided: Verbal  Provided to: Patient  Level of Understanding: Verbalized              Time Calculation:   Total Timed Code Minutes- PT: 40 minute(s)                 Kayli Nicole PTA  9/9/2020

## 2020-09-11 ENCOUNTER — TREATMENT (OUTPATIENT)
Dept: PHYSICAL THERAPY | Facility: CLINIC | Age: 70
End: 2020-09-11

## 2020-09-11 DIAGNOSIS — Z86.12 HISTORY OF POLIOMYELITIS: ICD-10-CM

## 2020-09-11 DIAGNOSIS — Z74.09 IMPAIRED FUNCTIONAL MOBILITY, BALANCE, GAIT, AND ENDURANCE: Primary | ICD-10-CM

## 2020-09-11 DIAGNOSIS — I63.9 CEREBROVASCULAR ACCIDENT (CVA), UNSPECIFIED MECHANISM (HCC): ICD-10-CM

## 2020-09-11 PROCEDURE — 97110 THERAPEUTIC EXERCISES: CPT | Performed by: PHYSICAL THERAPIST

## 2020-09-11 NOTE — PROGRESS NOTES
Outpatient Physical Therapy Neuro Progress Note       Patient Name: Rosalba Churchill  : 1950  MRN: [id number]  Today's Date: 2020      Visit Date: 2020    Visit Dx:    ICD-10-CM ICD-9-CM   1. Impaired functional mobility, balance, gait, and endurance Z74.09 V49.89   2. Cerebrovascular accident (CVA), unspecified mechanism (CMS/[facility]) I63.9 434.91   3. History of poliomyelitis Z86.12 V12.02       Patient Active Problem List   Diagnosis   • Essential hypertension   • Hyperlipidemia   • Type 2 diabetes mellitus with circulatory disorder, without long-term current use of insulin (CMS/[facility])   • Stroke (CMS/[facility])   • History of poliomyelitis                       PT Assessment/Plan     Row Name 20 1100          PT Assessment    Functional Limitations  Decreased safety during functional activities;Impaired gait;Impaired locomotion;Limitation in home management;Limitations in community activities;Limitations in functional capacity and performance;Performance in leisure activities  -WJ     Impairments  Balance;Endurance;Gait;Impaired muscle endurance;Muscle strength;Locomotion;Posture  -WJ     Assessment Comments  At this time Rosalba has met 3 goals and partially met another. Although she did not meet her goal for TUG her time improved from 15.23 to 11.4. She reports a 25% improvement since starting with therapy, reporting that she has noticed the most improvement with her endurance, sit to stands, decrease in her fear of falling, and balance especially when outside working in the yard. She still demonstrated some hip extension weakness as well as decreased endurance but still improved since inital evaluation. At this time Rosalba is ready and requesting to continue with these things at home. We updated and finlaized her HEP today to include continued BLE strengthening, balance techniques, and sit to stands technique. We also discussed proper home safety and gait mechanics.   -TR     Rehab Potential   Good  -WJ     Patient/caregiver participated in establishment of treatment plan and goals  Yes  -WJ        PT Plan    Predicted Duration of Therapy Intervention (Therapy Eval)  place POC on hold for now  -WJ     Planned CPT's?  PT RE-EVAL: 16435;PT THER PROC EA 15 MIN: 58338;PT THER ACT EA 15 MIN: 06381;PT MANUAL THERAPY EA 15 MIN: 05126;PT NEUROMUSC RE-EDUCATION EA 15 MIN: 62544;PT GAIT TRAINING EA 15 MIN: 16787;PT SELF CARE/HOME MGMT/TRAIN EA 15: 01203;PT ELECTRICAL STIM UNATTEND: ;PT ELECTRICAL STIM ATTD EA 15 MIN: 75495;PT HOT/COLD PACK WC NONMCARE: 16216  -WJ     PT Plan Comments  Place on hold for 3 weeks and if we do not hear from pt we will D/C. She was instructed to call of she needed any thing further or saw any decline in her strength or balance.  -TR       User Key  (r) = Recorded By, (t) = Taken By, (c) = Cosigned By    Initials Name Provider Type    Kayli Galan PTA Physical Therapy Assistant    Julio Carter, PT DPT Physical Therapist             OP Exercises     Row Name 09/11/20 0100             Subjective Comments    Subjective Comments  Pt reports that her knee brace is about to break so she is being extra cautious   -TR         Subjective Pain    Able to rate subjective pain?  yes  -TR      Pre-Treatment Pain Level  5  -TR      Post-Treatment Pain Level  5  -TR      Subjective Pain Comment  R shoulder   -TR         Total Minutes    15234 - PT Therapeutic Exercise Minutes  40  -TR         Exercise 1    Exercise Name 1  Addressed all goals for progress note   -TR         Exercise 2    Exercise Name 2  Reviewed all HEP components and helped pt set up kwiry sharif  -TR      Cueing 2  Verbal;Demo  -TR         Exercise 3    Exercise Name 3  Stair training for safety   -TR         Exercise 4    Exercise Name 4  TUG  -TR        User Key  (r) = Recorded By, (t) = Taken By, (c) = Cosigned By    Initials Name Provider Type    Kayli Galan PTA Physical Therapy Assistant                       PT OP Goals     Row Name 09/11/20 1100          Long Term Goals    LTG Date to Achieve  09/30/20  -TR     LTG 1  Pt to demonstrate independence with comprehensive home exercise program.  -TR     LTG 1 Progress  Met  -TR     LTG 1 Progress Comments  Reviewed all components today and pt has been compliant   -TR     LTG 2  Pt to demonstrate gross LLE strength of 4+/5.  -TR     LTG 2 Progress  Ongoing;Partially Met  -TR     LTG 2 Progress Comments  L hip flexion 4+/5, L hip abduction 4+/5, L hip extension 4/5  -TR     LTG 3  Pt is able to complete Rhomberg stance for >15 seconds.  -TR     LTG 3 Progress  Met  -TR     LTG 3 Progress Comments  20-25 seconds today with no sway   -TR     LTG 4  Pt is able to ambulate 350 ft without fatigue and decreased fear of falling.  -TR     LTG 4 Progress  Met  -TR     LTG 4 Progress Comments  Pt walked 400 feet with improved confidence and no LOB.   -TR     LTG 5  Pt to complete TUG in < or = 11 seconds.  -TR     LTG 5 Progress  Not Met;Ongoing  -TR     LTG 5 Progress Comments  11.4 today   -TR       User Key  (r) = Recorded By, (t) = Taken By, (c) = Cosigned By    Initials Name Provider Type    TR Kayli Nicole, PTA Physical Therapy Assistant          Therapy Education  Given: HEP  Program: Reinforced  How Provided: Verbal, Demonstration, Written  Provided to: Patient  Level of Understanding: Verbalized, Demonstrated       Timed Up and Go (TUG)  TUG Test 1: 13.1 seconds  TUG Test 2: 11.4 seconds      Time Calculation:   Total Timed Code Minutes- PT: 40 minute(s)       PT G-Leonarda  TUG Test 1: 13.1 seconds  TUG Test 2: 11.4 seconds         Julio Logan, PT DPT  9/11/2020

## 2020-09-15 DIAGNOSIS — I10 ESSENTIAL HYPERTENSION: ICD-10-CM

## 2020-09-16 RX ORDER — AMLODIPINE BESYLATE 10 MG/1
TABLET ORAL
Qty: 90 TABLET | Refills: 3 | Status: SHIPPED | OUTPATIENT
Start: 2020-09-16 | End: 2021-10-04

## 2020-10-07 ENCOUNTER — TRANSCRIBE ORDERS (OUTPATIENT)
Dept: ADMINISTRATIVE | Facility: HOSPITAL | Age: 70
End: 2020-10-07

## 2020-10-07 DIAGNOSIS — Z12.31 OTHER SCREENING MAMMOGRAM: Primary | ICD-10-CM

## 2020-10-09 ENCOUNTER — HOSPITAL ENCOUNTER (OUTPATIENT)
Dept: MAMMOGRAPHY | Facility: HOSPITAL | Age: 70
Discharge: HOME | End: 2020-10-09
Admitting: INTERNAL MEDICINE

## 2020-10-09 DIAGNOSIS — Z12.31 OTHER SCREENING MAMMOGRAM: ICD-10-CM

## 2020-10-09 PROCEDURE — 77063 BREAST TOMOSYNTHESIS BI: CPT

## 2020-10-09 PROCEDURE — 77067 SCR MAMMO BI INCL CAD: CPT

## 2020-11-25 ENCOUNTER — LAB (OUTPATIENT)
Dept: LAB | Facility: HOSPITAL | Age: 70
End: 2020-11-25

## 2020-11-25 DIAGNOSIS — E78.2 MIXED HYPERLIPIDEMIA: ICD-10-CM

## 2020-11-25 DIAGNOSIS — E11.59 TYPE 2 DIABETES MELLITUS WITH OTHER CIRCULATORY COMPLICATION, WITHOUT LONG-TERM CURRENT USE OF INSULIN (HCC): ICD-10-CM

## 2020-11-25 LAB
CHOLEST SERPL-MCNC: 142 MG/DL (ref 130–200)
HBA1C MFR BLD: 7.2 % (ref 4.8–5.9)
HDLC SERPL-MCNC: 38 MG/DL
LDLC SERPL CALC-MCNC: 84 MG/DL (ref 0–99)
LDLC/HDLC SERPL: 2.17 {RATIO}
TRIGL SERPL-MCNC: 108 MG/DL (ref 0–149)
VLDLC SERPL-MCNC: 20 MG/DL (ref 5–40)

## 2020-11-25 PROCEDURE — 83036 HEMOGLOBIN GLYCOSYLATED A1C: CPT

## 2020-11-25 PROCEDURE — 36415 COLL VENOUS BLD VENIPUNCTURE: CPT

## 2020-11-25 PROCEDURE — 80061 LIPID PANEL: CPT

## 2020-11-30 ENCOUNTER — OFFICE VISIT (OUTPATIENT)
Dept: INTERNAL MEDICINE | Facility: CLINIC | Age: 70
End: 2020-11-30

## 2020-11-30 VITALS
HEIGHT: 63 IN | SYSTOLIC BLOOD PRESSURE: 140 MMHG | DIASTOLIC BLOOD PRESSURE: 70 MMHG | RESPIRATION RATE: 15 BRPM | HEART RATE: 75 BPM | OXYGEN SATURATION: 98 % | WEIGHT: 157 LBS | BODY MASS INDEX: 27.82 KG/M2 | TEMPERATURE: 97.6 F

## 2020-11-30 DIAGNOSIS — E11.59 TYPE 2 DIABETES MELLITUS WITH OTHER CIRCULATORY COMPLICATION, WITHOUT LONG-TERM CURRENT USE OF INSULIN (HCC): ICD-10-CM

## 2020-11-30 DIAGNOSIS — Z00.00 MEDICARE ANNUAL WELLNESS VISIT, SUBSEQUENT: Primary | ICD-10-CM

## 2020-11-30 DIAGNOSIS — I10 ESSENTIAL HYPERTENSION: ICD-10-CM

## 2020-11-30 DIAGNOSIS — Z23 NEED FOR INFLUENZA VACCINATION: ICD-10-CM

## 2020-11-30 DIAGNOSIS — E78.2 MIXED HYPERLIPIDEMIA: ICD-10-CM

## 2020-11-30 PROCEDURE — 1170F FXNL STATUS ASSESSED: CPT | Performed by: INTERNAL MEDICINE

## 2020-11-30 PROCEDURE — 1159F MED LIST DOCD IN RCRD: CPT | Performed by: INTERNAL MEDICINE

## 2020-11-30 PROCEDURE — G0439 PPPS, SUBSEQ VISIT: HCPCS | Performed by: INTERNAL MEDICINE

## 2020-11-30 PROCEDURE — 90694 VACC AIIV4 NO PRSRV 0.5ML IM: CPT | Performed by: INTERNAL MEDICINE

## 2020-11-30 PROCEDURE — 96160 PT-FOCUSED HLTH RISK ASSMT: CPT | Performed by: INTERNAL MEDICINE

## 2020-11-30 PROCEDURE — G0008 ADMIN INFLUENZA VIRUS VAC: HCPCS | Performed by: INTERNAL MEDICINE

## 2020-11-30 PROCEDURE — 1125F AMNT PAIN NOTED PAIN PRSNT: CPT | Performed by: INTERNAL MEDICINE

## 2020-11-30 NOTE — PROGRESS NOTES
The ABCs of the Annual Wellness Visit  Subsequent Medicare Wellness Visit    Chief Complaint   Patient presents with   • Medicare Wellness-subsequent       Subjective   History of Present Illness:  Rosalba Churchill is a 70 y.o. female who presents for a Subsequent Medicare Wellness Visit.    She reports having a new blister on her right leg where her new brace was rubbing her.  She is now within her old brace.  Does not feel it is infected for this she needs further evaluation and work-up    She had labs last week.  These were reviewed with her.  Cholesterol is slightly above goal and A1c is 7.2.  She reports eating whatever she would like.  She is eating carbohydrates and is not exercising at the moment.  She is going to get a stationary bike.    She recently had cataract surgery and reports better vision in both eyes.      HEALTH RISK ASSESSMENT    Recent Hospitalizations:  No hospitalization(s) within the last year.    Current Medical Providers:  Patient Care Team:  LENI Lovett MD as PCP - General (Hospitalist)    Smoking Status:  Social History     Tobacco Use   Smoking Status Never Smoker   Smokeless Tobacco Never Used       Alcohol Consumption:  Social History     Substance and Sexual Activity   Alcohol Use No   • Frequency: Never       Depression Screen:   PHQ-2/PHQ-9 Depression Screening 11/30/2020   Little interest or pleasure in doing things 1   Feeling down, depressed, or hopeless -   Total Score 1       Fall Risk Screen:  STEADI Fall Risk Assessment was completed, and patient is at MODERATE risk for falls. Assessment completed on:11/30/2020    Health Habits and Functional and Cognitive Screening:  Functional & Cognitive Status 11/30/2020   Do you have difficulty preparing food and eating? No   Do you have difficulty bathing yourself, getting dressed or grooming yourself? No   Do you have difficulty using the toilet? No   Do you have difficulty moving around from place to place? No   Do you have  trouble with steps or getting out of a bed or a chair? No   Current Diet Unhealthy Diet   Dental Exam Not up to date   Eye Exam Up to date   Exercise (times per week) 0 times per week   Current Exercise Activities Include None   Do you need help using the phone?  No   Are you deaf or do you have serious difficulty hearing?  No   Do you need help with transportation? No   Do you need help shopping? No   Do you need help preparing meals?  No   Do you need help with housework?  No   Do you need help with laundry? No   Do you need help taking your medications? No   Do you need help managing money? No   Do you ever drive or ride in a car without wearing a seat belt? No   Have you felt unusual stress, anger or loneliness in the last month? No   Who do you live with? Alone   If you need help, do you have trouble finding someone available to you? No   Have you been bothered in the last four weeks by sexual problems? No   Do you have difficulty concentrating, remembering or making decisions? No       Does the patient have evidence of cognitive impairment? No    Asprin use counseling:Taking ASA appropriately as indicated         Age-appropriate Screening Schedule:  Refer to the list below for future screening recommendations based on patient's age, sex and/or medical conditions. Orders for these recommended tests are listed in the plan section. The patient has been provided with a written plan.    Health Maintenance   Topic Date Due   • TDAP/TD VACCINES (1 - Tdap) 04/29/1970   • ZOSTER VACCINE (1 of 2) 04/29/2001   • DIABETIC FOOT EXAM  07/09/2020   • COLONOSCOPY  01/01/2021   • HEMOGLOBIN A1C  05/25/2021   • URINE MICROALBUMIN  07/23/2021   • DIABETIC EYE EXAM  09/30/2021   • MAMMOGRAM  10/09/2021   • LIPID PANEL  11/25/2021   • INFLUENZA VACCINE  Completed          The following portions of the patient's history were reviewed and updated as appropriate: allergies, current medications, past family history, past medical  history, past social history, past surgical history and problem list.    Outpatient Medications Prior to Visit   Medication Sig Dispense Refill   • amLODIPine (NORVASC) 10 MG tablet TAKE 1 TABLET BY MOUTH EVERY DAY 90 tablet 3   • aspirin 81 MG EC tablet Take 1 tablet by mouth Daily. 30 tablet 5   • atorvastatin (LIPITOR) 40 MG tablet Take 1 tablet by mouth Every Other Day. 45 tablet 1   • lisinopril (PRINIVIL,ZESTRIL) 20 MG tablet TAKE 1 TABLET BY MOUTH TWICE A  tablet 1   • metFORMIN (GLUCOPHAGE) 500 MG tablet TAKE 2 TABLETS BY MOUTH 2 (TWO) TIMES A DAY WITH MEALS. 360 tablet 1   • sertraline (ZOLOFT) 100 MG tablet TAKE 1 TABLET BY MOUTH EVERY DAY 30 tablet 5     No facility-administered medications prior to visit.        Patient Active Problem List   Diagnosis   • Essential hypertension   • Hyperlipidemia   • Type 2 diabetes mellitus with circulatory disorder, without long-term current use of insulin (CMS/[facility])   • Stroke (CMS/[facility])   • History of poliomyelitis       Advanced Care Planning:  ACP discussion was held with the patient during this visit. Patient does not have an advance directive, information provided.    Review of Systems   Constitutional: Negative for activity change, fatigue and fever.   HENT: Negative.  Negative for congestion and trouble swallowing.    Respiratory: Negative for cough and shortness of breath.    Cardiovascular: Negative for chest pain, palpitations and leg swelling.   Gastrointestinal: Negative.    Endocrine: Negative for polydipsia and polyuria.   Genitourinary: Negative.    Musculoskeletal: Positive for arthralgias (Chronic right shoulder) and gait problem.   Skin:        New blister right leg     Neurological: Positive for weakness.   Hematological: Negative.    Psychiatric/Behavioral: Negative.        Compared to one year ago, the patient feels her physical health is the same.  Compared to one year ago, the patient feels her mental health is the same.    Reviewed chart  "for potential of high risk medication in the elderly: yes  Reviewed chart for potential of harmful drug interactions in the elderly:yes    Objective         Vitals:    11/30/20 1128   BP: 140/70   BP Location: Left arm   Patient Position: Sitting   Cuff Size: Adult   Pulse: 75   Resp: 15   Temp: 97.6 °F (36.4 °C)   TempSrc: Oral   SpO2: 98%   Weight: 71.2 kg (157 lb)   Height: 160 cm (62.99\")       Body mass index is 27.82 kg/m².  Discussed the patient's BMI with her. The BMI is above average; BMI management plan is completed.    Physical Exam  Vitals signs reviewed.   Constitutional:       General: She is not in acute distress.     Appearance: Normal appearance. She is well-developed. She is not diaphoretic.      Comments: Very pleasant     HENT:      Head: Normocephalic and atraumatic.   Eyes:      Pupils: Pupils are equal, round, and reactive to light.   Neck:      Thyroid: No thyromegaly.      Trachea: Phonation normal.   Cardiovascular:      Rate and Rhythm: Normal rate and regular rhythm.      Heart sounds: No murmur.   Pulmonary:      Effort: Pulmonary effort is normal. No respiratory distress.      Breath sounds: Normal breath sounds. No wheezing, rhonchi or rales.   Abdominal:      General: Abdomen is flat.      Palpations: Abdomen is soft.      Tenderness: There is no abdominal tenderness.   Musculoskeletal:      Right lower leg: No edema.      Left lower leg: No edema.   Skin:     General: Skin is warm and dry.      Coloration: Skin is not jaundiced or pale.      Findings: No erythema.   Neurological:      General: No focal deficit present.      Mental Status: She is alert. Mental status is at baseline.   Psychiatric:         Behavior: Behavior normal.         Thought Content: Thought content normal.         Judgment: Judgment normal.         Lab Results   Component Value Date    TRIG 108 11/25/2020    HDL 38 (L) 11/25/2020    LDL 84 11/25/2020    VLDL 20 11/25/2020    HGBA1C 7.2 (H) 11/25/2020    "     Assessment/Plan   Medicare Risks and Personalized Health Plan  CMS Preventative Services Quick Reference  Advance Directive Discussion  Dementia/Memory   Depression/Dysphoria  Diabetic Lab Screening   Fall Risk  Immunizations Discussed/Encouraged (specific immunizations; Influenza, Pneumococcal 23 and COVID-19 )  Inadequate Social Support, Isolation, Loneliness, Lack of Transportation, Financial Difficulties, or Caregiver Stress   Inactivity/Sedentary  Obesity/Overweight   Osteoprorosis Risk    The above risks/problems have been discussed with the patient.  Pertinent information has been shared with the patient in the After Visit Summary.  Follow up plans and orders are seen below in the Assessment/Plan Section.    Diagnoses and all orders for this visit:    1. Medicare annual wellness visit, subsequent (Primary)    2. Essential hypertension    3. Mixed hyperlipidemia    4. Type 2 diabetes mellitus with other circulatory complication, without long-term current use of insulin (CMS/[facility])    5. Need for influenza vaccination  -     Fluad Quad 65+ yrs (1880-7910)      She is up-to-date on her health maintenance issues.  She reports having the Pneumovax 23 vaccine around 5 years ago although we do not have any record of this.  She is up-to-date on her colonoscopy but will be due next year.  She is also up-to-date on her mammogram and DEXA scan.  Her last DEXA was November 1, 2019 and was normal.  Diabetic eye exam was performed around September 2020.  Influenza vaccine given in the office today.    Discussed nutrition and exercise and how its going to relate to chronic medical issues including the past medical history for stroke, diabetes, hypertension and hyperlipidemia.  She is going to the eat better and start exercising with the stationary bike.    A1c is 7.2 which is stable with metformin 1000 mg twice a day.  Should improve with lifestyle modification    Cholesterol is slightly elevated compared to goal with an  LDL goal of less than 70.  This should improve also with lifestyle modification as it should her blood pressure.  Continue current medications for now but if in 6 months we are still above goals we may need to add additional medication.    Follow-up in 6 months or sooner if clinically indicated.  I am going to order an A1c and lipid panel for her to obtain a couple days prior to her next visit    Follow Up:  No follow-ups on file.     An After Visit Summary and PPPS were given to the patient.

## 2020-11-30 NOTE — PATIENT INSTRUCTIONS
Advance Directive    Advance directives are legal documents that let you make choices ahead of time about your health care and medical treatment in case you become unable to communicate for yourself. Advance directives are a way for you to make known your wishes to family, friends, and health care providers. This can let others know about your end-of-life care if you become unable to communicate.  Discussing and writing advance directives should happen over time rather than all at once. Advance directives can be changed depending on your situation and what you want, even after you have signed the advance directives.  There are different types of advance directives, such as:  · Medical power of .  · Living will.  · Do not resuscitate (DNR) or do not attempt resuscitation (DNAR) order.  Health care proxy and medical power of   A health care proxy is also called a health care agent. This is a person who is appointed to make medical decisions for you in cases where you are unable to make the decisions yourself. Generally, people choose someone they know well and trust to represent their preferences. Make sure to ask this person for an agreement to act as your proxy. A proxy may have to exercise judgment in the event of a medical decision for which your wishes are not known.  A medical power of  is a legal document that names your health care proxy. Depending on the laws in your state, after the document is written, it may also need to be:  · Signed.  · Notarized.  · Dated.  · Copied.  · Witnessed.  · Incorporated into your medical record.  You may also want to appoint someone to manage your money in a situation in which you are unable to do so. This is called a durable power of  for finances. It is a separate legal document from the durable power of  for health care. You may choose the same person or someone different from your health care proxy to act as your agent in money  matters.  If you do not appoint a proxy, or if there is a concern that the proxy is not acting in your best interests, a court may appoint a guardian to act on your behalf.  Living will  A living will is a set of instructions that state your wishes about medical care when you cannot express them yourself. Health care providers should keep a copy of your living will in your medical record. You may want to give a copy to family members or friends. To alert caregivers in case of an emergency, you can place a card in your wallet to let them know that you have a living will and where they can find it. A living will is used if you become:  · Terminally ill.  · Disabled.  · Unable to communicate or make decisions.  Items to consider in your living will include:  · To use or not to use life-support equipment, such as dialysis machines and breathing machines (ventilators).  · A DNR or DNAR order. This tells health care providers not to use cardiopulmonary resuscitation (CPR) if breathing or heartbeat stops.  · To use or not to use tube feeding.  · To be given or not to be given food and fluids.  · Comfort (palliative) care when the goal becomes comfort rather than a cure.  · Donation of organs and tissues.  A living will does not give instructions for distributing your money and property if you should pass away.  DNR or DNAR  A DNR or DNAR order is a request not to have CPR in the event that your heart stops beating or you stop breathing. If a DNR or DNAR order has not been made and shared, a health care provider will try to help any patient whose heart has stopped or who has stopped breathing. If you plan to have surgery, talk with your health care provider about how your DNR or DNAR order will be followed if problems occur.  What if I do not have an advance directive?  If you do not have an advance directive, some states assign family decision makers to act on your behalf based on how closely you are related to them. Each  state has its own laws about advance directives. You may want to check with your health care provider, , or state representative about the laws in your state.  Summary  · Advance directives are the legal documents that allow you to make choices ahead of time about your health care and medical treatment in case you become unable to tell others about your care.  · The process of discussing and writing advance directives should happen over time. You can change the advance directives, even after you have signed them.  · Advance directives include DNR or DNAR orders, living maguire, and designating an agent as your medical power of .  This information is not intended to replace advice given to you by your health care provider. Make sure you discuss any questions you have with your health care provider.  Document Revised: 07/16/2020 Document Reviewed: 07/16/2020  Elsevier Patient Education © 2020 [employer]    Advance Care Planning and Advance Directives     You make decisions on a daily basis - decisions about where you want to live, your career, your home, your life. Perhaps one of the most important decisions you face is your choice for future medical care. Take time to talk with your family and your healthcare team and start planning today.  Advance Care Planning is a process that can help you:  · Understand possible future healthcare decisions in light of your own experiences  · Reflect on those decision in light of your goals and values  · Discuss your decisions with those closest to you and the healthcare professionals that care for you  · Make a plan by creating a document that reflects your wishes    Surrogate Decision Maker  In the event of a medical emergency, which has left you unable to communicate or to make your own decisions, you would need someone to make decisions for you.  It is important to discuss your preferences for medical treatment with this person while you are in good health.         Qualities of a surrogate decision maker:  • Willing to take on this role and responsibility  • Knows what you want for future medical care  • Willing to follow your wishes even if they don't agree with them  • Able to make difficult medical decisions under stressful circumstances    Advance Directives  These are legal documents you can create that will guide your healthcare team and decision maker(s) when needed. These documents can be stored in the electronic medical record.    · Living Will - a legal document to guide your care if you have a terminal condition or a serious illness and are unable to communicate. States vary by statute in document names/types, but most forms may include one or more of the following:        -  Directions regarding life-prolonging treatments        -  Directions regarding artificially provided nutrition/hydration        -  Choosing a healthcare decision maker        -  Direction regarding organ/tissue donation    · Durable Power of  for Healthcare - this document names an -in-fact to make medical decisions for you, but it may also allow this person to make personal and financial decisions for you. Please seek the advice of an  if you need this type of document.    **Advance Directives are not required and no one may discriminate against you if you do not sign one.    Medical Orders  Many states allow specific forms/orders signed by your physician to record your wishes for medical treatment in your current state of health. This form, signed in personal communication with your physician, addresses resuscitation and other medical interventions that you may or may not want.      For more information or to schedule a time with a [facility] Advance Care Planning Facilitator contact: [email or web address] or call [phone] and someone will contact you directly.

## 2021-02-08 RX ORDER — SERTRALINE HYDROCHLORIDE 100 MG/1
100 TABLET, FILM COATED ORAL DAILY
Qty: 90 TABLET | Refills: 2 | Status: SHIPPED | OUTPATIENT
Start: 2021-02-08 | End: 2021-12-06 | Stop reason: SDUPTHER

## 2021-02-11 DIAGNOSIS — E11.59 TYPE 2 DIABETES MELLITUS WITH OTHER CIRCULATORY COMPLICATION, WITHOUT LONG-TERM CURRENT USE OF INSULIN (HCC): ICD-10-CM

## 2021-03-05 DIAGNOSIS — I10 ESSENTIAL HYPERTENSION: ICD-10-CM

## 2021-03-05 RX ORDER — LISINOPRIL 20 MG/1
TABLET ORAL
Qty: 180 TABLET | Refills: 1 | Status: SHIPPED | OUTPATIENT
Start: 2021-03-05 | End: 2021-09-02

## 2021-03-09 DIAGNOSIS — E78.2 MIXED HYPERLIPIDEMIA: ICD-10-CM

## 2021-03-09 RX ORDER — ATORVASTATIN CALCIUM 40 MG/1
40 TABLET, FILM COATED ORAL EVERY OTHER DAY
Qty: 45 TABLET | Refills: 1 | Status: SHIPPED | OUTPATIENT
Start: 2021-03-09 | End: 2021-09-07

## 2021-03-19 ENCOUNTER — BULK ORDERING (OUTPATIENT)
Dept: CASE MANAGEMENT | Facility: OTHER | Age: 71
End: 2021-03-19

## 2021-03-19 DIAGNOSIS — Z23 IMMUNIZATION DUE: ICD-10-CM

## 2021-04-02 ENCOUNTER — IMMUNIZATION (OUTPATIENT)
Age: 71
End: 2021-04-02
Payer: MEDICARE

## 2021-04-02 PROCEDURE — 91300 COVID-19, PFIZER VACCINE 30MCG/0.3ML DOSE: CPT | Performed by: FAMILY MEDICINE

## 2021-04-02 PROCEDURE — 0001A COVID-19, PFIZER VACCINE 30MCG/0.3ML DOSE: CPT | Performed by: FAMILY MEDICINE

## 2021-04-23 ENCOUNTER — IMMUNIZATION (OUTPATIENT)
Age: 71
End: 2021-04-23
Payer: MEDICARE

## 2021-04-23 PROCEDURE — 91300 COVID-19, PFIZER VACCINE 30MCG/0.3ML DOSE: CPT | Performed by: FAMILY MEDICINE

## 2021-04-23 PROCEDURE — 0002A PR IMM ADMN SARSCOV2 30MCG/0.3ML DIL RECON 2ND DOSE: CPT | Performed by: FAMILY MEDICINE

## 2021-06-01 ENCOUNTER — LAB (OUTPATIENT)
Dept: LAB | Facility: HOSPITAL | Age: 71
End: 2021-06-01

## 2021-06-01 DIAGNOSIS — E11.59 TYPE 2 DIABETES MELLITUS WITH OTHER CIRCULATORY COMPLICATION, WITHOUT LONG-TERM CURRENT USE OF INSULIN (HCC): ICD-10-CM

## 2021-06-01 DIAGNOSIS — E78.2 MIXED HYPERLIPIDEMIA: ICD-10-CM

## 2021-06-01 LAB
CHOLEST SERPL-MCNC: 190 MG/DL (ref 130–200)
HBA1C MFR BLD: 7.7 % (ref 4.8–5.9)
HDLC SERPL-MCNC: 43 MG/DL
LDLC SERPL CALC-MCNC: 125 MG/DL (ref 0–99)
LDLC/HDLC SERPL: 2.85 {RATIO}
TRIGL SERPL-MCNC: 122 MG/DL (ref 0–149)
VLDLC SERPL-MCNC: 22 MG/DL (ref 5–40)

## 2021-06-01 PROCEDURE — 83036 HEMOGLOBIN GLYCOSYLATED A1C: CPT

## 2021-06-01 PROCEDURE — 36415 COLL VENOUS BLD VENIPUNCTURE: CPT

## 2021-06-01 PROCEDURE — 80061 LIPID PANEL: CPT

## 2021-06-02 ENCOUNTER — HOSPITAL ENCOUNTER (OUTPATIENT)
Dept: GENERAL RADIOLOGY | Facility: HOSPITAL | Age: 71
Discharge: HOME | End: 2021-06-02
Admitting: INTERNAL MEDICINE

## 2021-06-02 ENCOUNTER — OFFICE VISIT (OUTPATIENT)
Dept: INTERNAL MEDICINE | Facility: CLINIC | Age: 71
End: 2021-06-02

## 2021-06-02 VITALS
HEIGHT: 63 IN | BODY MASS INDEX: 27.46 KG/M2 | DIASTOLIC BLOOD PRESSURE: 65 MMHG | SYSTOLIC BLOOD PRESSURE: 139 MMHG | HEART RATE: 68 BPM | TEMPERATURE: 97.2 F | WEIGHT: 155 LBS | RESPIRATION RATE: 14 BRPM | OXYGEN SATURATION: 98 %

## 2021-06-02 DIAGNOSIS — I63.9 CEREBROVASCULAR ACCIDENT (CVA), UNSPECIFIED MECHANISM (HCC): ICD-10-CM

## 2021-06-02 DIAGNOSIS — L98.9 LESION OF SKIN OF SCALP: ICD-10-CM

## 2021-06-02 DIAGNOSIS — E11.59 TYPE 2 DIABETES MELLITUS WITH OTHER CIRCULATORY COMPLICATION, WITHOUT LONG-TERM CURRENT USE OF INSULIN (HCC): Primary | ICD-10-CM

## 2021-06-02 DIAGNOSIS — Z86.12 HISTORY OF POLIOMYELITIS: ICD-10-CM

## 2021-06-02 DIAGNOSIS — E78.2 MIXED HYPERLIPIDEMIA: ICD-10-CM

## 2021-06-02 DIAGNOSIS — I10 ESSENTIAL HYPERTENSION: ICD-10-CM

## 2021-06-02 DIAGNOSIS — Z00.00 HEALTHCARE MAINTENANCE: ICD-10-CM

## 2021-06-02 PROCEDURE — 99214 OFFICE O/P EST MOD 30 MIN: CPT | Performed by: INTERNAL MEDICINE

## 2021-06-02 PROCEDURE — 70250 X-RAY EXAM OF SKULL: CPT

## 2021-06-02 NOTE — PROGRESS NOTES
Chief Complaint   Patient presents with   • Follow-up   • Hypertension   • Knot     On head          History:  Rosalba Churchill is a 71 y.o. female who presents today for evaluation of the above problems.    She presents today for her 6-month follow-up.  Recent labs were reviewed showing an A1c of 7.7, total cholesterol 190, HDL 43 and LDL increased at 125.  She reports that she has not eaten very well recently.    She is also had a bump on the back part of her head for years but has noticed its gotten larger recently.  Additionally, it causes some mild discomfort.    She is having issues walking recently.  She has had poliomyelitis and a previous stroke with residual right-sided weakness.  She must wear a brace on her right knee to avoid hyperextension.  Her current brace has kept breaking and has significant heft to it.  Due to the underlying weakness both from poliomyelitis and from the stroke she has a difficult time just picking up her leg and foot to walk.  She is afraid to walk and when she does walk she gets very tired.  She is wondering if a knee brace may benefit her.    She also has a difficult time exercising because of her leg.    Blood pressure is initially high today 160/70.  This was taken right after she was walking.  Normally, her blood pressure is between 100 2135 systolic.    On repeat blood pressure today it was 139/65    HPI    ROS:  Review of Systems  See above    Current Outpatient Medications:   •  amLODIPine (NORVASC) 10 MG tablet, TAKE 1 TABLET BY MOUTH EVERY DAY, Disp: 90 tablet, Rfl: 3  •  aspirin 81 MG EC tablet, Take 1 tablet by mouth Daily., Disp: 30 tablet, Rfl: 5  •  atorvastatin (LIPITOR) 40 MG tablet, TAKE 1 TABLET BY MOUTH EVERY OTHER DAY, Disp: 45 tablet, Rfl: 1  •  lisinopril (PRINIVIL,ZESTRIL) 20 MG tablet, TAKE 1 TABLET BY MOUTH TWICE A DAY, Disp: 180 tablet, Rfl: 1  •  metFORMIN (GLUCOPHAGE) 500 MG tablet, Take 2 tablets by mouth 2 (Two) Times a Day With Meals., Disp: 360  "tablet, Rfl: 1  •  sertraline (ZOLOFT) 100 MG tablet, Take 1 tablet by mouth Daily., Disp: 90 tablet, Rfl: 2    Lab Results   Component Value Date    GLUCOSE 153 (H) 07/23/2020    BUN 14 07/23/2020    CREATININE 0.54 07/23/2020    EGFRIFNONA 112 07/23/2020    BCR 25.9 (H) 07/23/2020    K 4.2 07/23/2020    CO2 31.0 07/23/2020    CALCIUM 9.3 07/23/2020    ALBUMIN 4.30 07/23/2020    AST 26 07/23/2020    ALT 26 07/23/2020       WBC   Date Value Ref Range Status   07/23/2020 6.50 3.40 - 10.80 10*3/mm3 Final     RBC   Date Value Ref Range Status   07/23/2020 4.63 3.77 - 5.28 10*6/mm3 Final     Hemoglobin   Date Value Ref Range Status   07/23/2020 13.0 12.0 - 15.9 g/dL Final     Hematocrit   Date Value Ref Range Status   07/23/2020 38.6 34.0 - 46.6 % Final     MCV   Date Value Ref Range Status   07/23/2020 83.4 79.0 - 97.0 fL Final     MCH   Date Value Ref Range Status   07/23/2020 28.1 26.6 - 33.0 pg Final     MCHC   Date Value Ref Range Status   07/23/2020 33.7 31.5 - 35.7 g/dL Final     RDW   Date Value Ref Range Status   07/23/2020 11.9 (L) 12.3 - 15.4 % Final     MPV   Date Value Ref Range Status   07/23/2020 8.4 6.0 - 12.0 fL Final     Platelets   Date Value Ref Range Status   07/23/2020 232 140 - 450 10*3/mm3 Final     Neutrophil %   Date Value Ref Range Status   07/23/2020 59.2 42.7 - 76.0 % Final     Lymphocyte %   Date Value Ref Range Status   07/23/2020 31.1 19.6 - 45.3 % Final     Neutrophils, Absolute   Date Value Ref Range Status   07/23/2020 3.90 1.70 - 7.00 10*3/mm3 Final     Lymphocytes, Absolute   Date Value Ref Range Status   07/23/2020 2.00 0.70 - 3.10 10*3/mm3 Final         OBJECTIVE:  Visit Vitals  /65 (BP Location: Right arm, Patient Position: Sitting, Cuff Size: Adult)   Pulse 68   Temp 97.2 °F (36.2 °C) (Oral)   Resp 14   Ht 160 cm (62.99\")   Wt 70.3 kg (155 lb)   SpO2 98%   BMI 27.46 kg/m²      Physical Exam  Vitals reviewed.   Constitutional:       General: She is not in acute distress.     " Appearance: Normal appearance. She is well-developed. She is not diaphoretic.      Comments: Very pleasant     HENT:      Head: Normocephalic and atraumatic.   Eyes:      Pupils: Pupils are equal, round, and reactive to light.   Neck:      Thyroid: No thyromegaly.      Trachea: Phonation normal.   Cardiovascular:      Rate and Rhythm: Normal rate and regular rhythm.      Heart sounds: No murmur heard.     Pulmonary:      Effort: Pulmonary effort is normal. No respiratory distress.      Breath sounds: Normal breath sounds. No wheezing, rhonchi or rales.   Abdominal:      General: Abdomen is flat.      Palpations: Abdomen is soft.      Tenderness: There is no abdominal tenderness.   Musculoskeletal:      Right lower leg: No edema.      Left lower leg: No edema.      Comments: Even with the right knee brace.  Hyperextension of the right knee likely from poliomyelitis and weakness from stroke   Skin:     General: Skin is warm and dry.      Coloration: Skin is not jaundiced or pale.      Findings: No erythema.   Neurological:      General: No focal deficit present.      Mental Status: She is alert. Mental status is at baseline.      Motor: Weakness (3-4 over 5 strength in the right lower extremity) present.   Psychiatric:         Behavior: Behavior normal.         Thought Content: Thought content normal.         Judgment: Judgment normal.         Assessment/Plan    Diagnoses and all orders for this visit:    1. Type 2 diabetes mellitus with other circulatory complication, without long-term current use of insulin (CMS/HCC) (Primary)  -     Hemoglobin A1c; Future    2. Essential hypertension    3. History of poliomyelitis  -     Hinged Knee Brace- Right    4. Cerebrovascular accident (CVA), unspecified mechanism (CMS/HCC)  -     Hinged Knee Brace- Right    5. Lesion of skin of scalp  -     XR skull < 4 vw; Future    6. Mixed hyperlipidemia  -     Lipid Panel; Future    7. Healthcare maintenance  -     Lipid Panel;  Future  -     Hemoglobin A1c; Future  -     Comprehensive Metabolic Panel; Future  -     CBC (No Diff); Future      Her A1c has increased as well as her cholesterol.  Rather than changing her medications she will focus more on diet which has been poor as of late.  We will continue her current regimen for now.  Repeat labs as above couple days prior to next visit in 6 months.  We may need to make adjusting the medication regimen if we are still above our goal.    She has a firm subcutaneous lesion on the right posterior scalp.  Would like to get a plain film looking for any calcium deposit.  Discussed 3 options with her including observation after this, ultrasound or CT scan.  She would like to observe it first.  We will follow-up in 6 months and obtain either ultrasound or CT scan if necessary.    I have ordered another knee brace.  I do not believe the one that she currently has can be fixed.  I also believe the one she has currently is hindering her ability to ambulate, partially because it is too heavy for her to .  This increases her falls risk even further.    Follow-up in 6 months with labs a couple days prior for her Medicare wellness visit.  Follow-up sooner if needed.      Return in about 6 months (around 12/2/2021) for Medicare Wellness with labs a couple days prior.    Patient was given instructions and counseling regarding his/her condition or for health maintenance advice. Please see specific information pulled into the AVS if appropriate.     TALIA Lovett MD   11:35 CDT  6/2/2021

## 2021-08-17 DIAGNOSIS — E11.59 TYPE 2 DIABETES MELLITUS WITH OTHER CIRCULATORY COMPLICATION, WITHOUT LONG-TERM CURRENT USE OF INSULIN (HCC): ICD-10-CM

## 2021-09-02 DIAGNOSIS — I10 ESSENTIAL HYPERTENSION: ICD-10-CM

## 2021-09-02 RX ORDER — LISINOPRIL 20 MG/1
20 TABLET ORAL 2 TIMES DAILY
Qty: 180 TABLET | Refills: 1 | Status: SHIPPED | OUTPATIENT
Start: 2021-09-02 | End: 2022-03-01

## 2021-09-05 DIAGNOSIS — E78.2 MIXED HYPERLIPIDEMIA: ICD-10-CM

## 2021-09-07 RX ORDER — ATORVASTATIN CALCIUM 40 MG/1
40 TABLET, FILM COATED ORAL EVERY OTHER DAY
Qty: 45 TABLET | Refills: 1 | Status: SHIPPED | OUTPATIENT
Start: 2021-09-07 | End: 2022-09-19 | Stop reason: SDUPTHER

## 2021-09-20 ENCOUNTER — HOSPITAL ENCOUNTER (OUTPATIENT)
Dept: GENERAL RADIOLOGY | Facility: HOSPITAL | Age: 71
Discharge: HOME | End: 2021-09-20
Admitting: INTERNAL MEDICINE

## 2021-09-20 ENCOUNTER — TRANSCRIBE ORDERS (OUTPATIENT)
Dept: GENERAL RADIOLOGY | Facility: HOSPITAL | Age: 71
End: 2021-09-20

## 2021-09-20 DIAGNOSIS — R30.0 DYSURIA: ICD-10-CM

## 2021-09-20 DIAGNOSIS — R30.0 DYSURIA: Primary | ICD-10-CM

## 2021-09-20 PROCEDURE — 74018 RADEX ABDOMEN 1 VIEW: CPT

## 2021-09-22 ENCOUNTER — OFFICE VISIT (OUTPATIENT)
Dept: INTERNAL MEDICINE | Facility: CLINIC | Age: 71
End: 2021-09-22

## 2021-09-22 ENCOUNTER — HOSPITAL ENCOUNTER (OUTPATIENT)
Dept: CT IMAGING | Facility: HOSPITAL | Age: 71
Discharge: HOME | End: 2021-09-22

## 2021-09-22 ENCOUNTER — LAB (OUTPATIENT)
Dept: LAB | Facility: HOSPITAL | Age: 71
End: 2021-09-22

## 2021-09-22 VITALS
DIASTOLIC BLOOD PRESSURE: 60 MMHG | RESPIRATION RATE: 15 BRPM | TEMPERATURE: 97.2 F | OXYGEN SATURATION: 98 % | HEART RATE: 95 BPM | WEIGHT: 155 LBS | HEIGHT: 63 IN | BODY MASS INDEX: 27.46 KG/M2 | SYSTOLIC BLOOD PRESSURE: 140 MMHG

## 2021-09-22 DIAGNOSIS — N20.0 NEPHROLITHIASIS: ICD-10-CM

## 2021-09-22 DIAGNOSIS — R10.12 LEFT UPPER QUADRANT ABDOMINAL PAIN: Primary | ICD-10-CM

## 2021-09-22 DIAGNOSIS — R10.12 LEFT UPPER QUADRANT ABDOMINAL PAIN: ICD-10-CM

## 2021-09-22 DIAGNOSIS — N20.0 NEPHROLITHIASIS: Primary | ICD-10-CM

## 2021-09-22 DIAGNOSIS — Z00.00 HEALTHCARE MAINTENANCE: ICD-10-CM

## 2021-09-22 DIAGNOSIS — E11.59 TYPE 2 DIABETES MELLITUS WITH OTHER CIRCULATORY COMPLICATION, WITHOUT LONG-TERM CURRENT USE OF INSULIN (HCC): ICD-10-CM

## 2021-09-22 DIAGNOSIS — E78.2 MIXED HYPERLIPIDEMIA: ICD-10-CM

## 2021-09-22 LAB
ALBUMIN SERPL-MCNC: 4.4 G/DL (ref 3.5–5.2)
ALBUMIN/GLOB SERPL: 1.5 G/DL
ALP SERPL-CCNC: 100 U/L (ref 39–117)
ALT SERPL W P-5'-P-CCNC: 19 U/L (ref 1–33)
ANION GAP SERPL CALCULATED.3IONS-SCNC: 8.5 MMOL/L (ref 5–15)
AST SERPL-CCNC: 17 U/L (ref 1–32)
BILIRUB SERPL-MCNC: 0.3 MG/DL (ref 0–1.2)
BILIRUB UR QL STRIP: NEGATIVE
BUN SERPL-MCNC: 13 MG/DL (ref 8–23)
BUN/CREAT SERPL: 22.8 (ref 7–25)
CALCIUM SPEC-SCNC: 9.1 MG/DL (ref 8.6–10.5)
CHLORIDE SERPL-SCNC: 99 MMOL/L (ref 98–107)
CHOLEST SERPL-MCNC: 151 MG/DL (ref 0–200)
CLARITY UR: CLEAR
CO2 SERPL-SCNC: 28.5 MMOL/L (ref 22–29)
COLOR UR: YELLOW
CREAT SERPL-MCNC: 0.57 MG/DL (ref 0.57–1)
DEPRECATED RDW RBC AUTO: 35.9 FL (ref 37–54)
ERYTHROCYTE [DISTWIDTH] IN BLOOD BY AUTOMATED COUNT: 12 % (ref 12.3–15.4)
GFR SERPL CREATININE-BSD FRML MDRD: 105 ML/MIN/1.73
GLOBULIN UR ELPH-MCNC: 2.9 GM/DL
GLUCOSE SERPL-MCNC: 307 MG/DL (ref 65–99)
GLUCOSE UR STRIP-MCNC: ABNORMAL MG/DL
HBA1C MFR BLD: 7.8 % (ref 4.8–5.6)
HCT VFR BLD AUTO: 39.6 % (ref 34–46.6)
HDLC SERPL-MCNC: 41 MG/DL (ref 40–60)
HGB BLD-MCNC: 12.8 G/DL (ref 12–15.9)
HGB UR QL STRIP.AUTO: NEGATIVE
KETONES UR QL STRIP: ABNORMAL
LDLC SERPL CALC-MCNC: 93 MG/DL (ref 0–100)
LDLC/HDLC SERPL: 2.26 {RATIO}
LEUKOCYTE ESTERASE UR QL STRIP.AUTO: NEGATIVE
LIPASE SERPL-CCNC: 24 U/L (ref 13–60)
MCH RBC QN AUTO: 27.2 PG (ref 26.6–33)
MCHC RBC AUTO-ENTMCNC: 32.3 G/DL (ref 31.5–35.7)
MCV RBC AUTO: 84.1 FL (ref 79–97)
NITRITE UR QL STRIP: NEGATIVE
PH UR STRIP.AUTO: 5.5 [PH] (ref 5–8)
PLATELET # BLD AUTO: 217 10*3/MM3 (ref 140–450)
PMV BLD AUTO: 9.9 FL (ref 6–12)
POTASSIUM SERPL-SCNC: 4.1 MMOL/L (ref 3.5–5.2)
PROT SERPL-MCNC: 7.3 G/DL (ref 6–8.5)
PROT UR QL STRIP: ABNORMAL
RBC # BLD AUTO: 4.71 10*6/MM3 (ref 3.77–5.28)
SODIUM SERPL-SCNC: 136 MMOL/L (ref 136–145)
SP GR UR STRIP: 1.02 (ref 1–1.03)
TRIGL SERPL-MCNC: 87 MG/DL (ref 0–150)
UROBILINOGEN UR QL STRIP: ABNORMAL
VLDLC SERPL-MCNC: 17 MG/DL (ref 5–40)
WBC # BLD AUTO: 5.42 10*3/MM3 (ref 3.4–10.8)

## 2021-09-22 PROCEDURE — 74176 CT ABD & PELVIS W/O CONTRAST: CPT

## 2021-09-22 PROCEDURE — 85027 COMPLETE CBC AUTOMATED: CPT

## 2021-09-22 PROCEDURE — 80061 LIPID PANEL: CPT

## 2021-09-22 PROCEDURE — 81003 URINALYSIS AUTO W/O SCOPE: CPT

## 2021-09-22 PROCEDURE — 80053 COMPREHEN METABOLIC PANEL: CPT

## 2021-09-22 PROCEDURE — 83036 HEMOGLOBIN GLYCOSYLATED A1C: CPT

## 2021-09-22 PROCEDURE — 83690 ASSAY OF LIPASE: CPT

## 2021-09-22 PROCEDURE — 99214 OFFICE O/P EST MOD 30 MIN: CPT | Performed by: INTERNAL MEDICINE

## 2021-09-22 PROCEDURE — 36415 COLL VENOUS BLD VENIPUNCTURE: CPT

## 2021-09-22 RX ORDER — CIPROFLOXACIN 250 MG/1
250 TABLET, FILM COATED ORAL 2 TIMES DAILY
COMMUNITY
End: 2021-09-29

## 2021-09-22 RX ORDER — PANTOPRAZOLE SODIUM 40 MG/1
40 TABLET, DELAYED RELEASE ORAL DAILY
Qty: 30 TABLET | Refills: 1 | Status: SHIPPED | OUTPATIENT
Start: 2021-09-22 | End: 2021-09-29

## 2021-09-22 NOTE — PROGRESS NOTES
Chief Complaint   Patient presents with   • Office Visit   • Left Sided Pain         History:  Rosalba Churchill is a 71 y.o. female who presents today for evaluation of the above problems.    She presents today for an acute visit for left upper quadrant pain since Saturday.  It also radiates to her left upper back.  She went to [address] [place] [address] internal medicine and was diagnosed with a UTI and given ciprofloxacin.  Apparently, she had hematuria on urinalysis.  Yesterday she felt better, but started to worsen again last night and had a very difficult time sleeping due to the pain.  She had decreased appetite pain.  Denies any nausea or vomiting.  She has had constipation with this acute issue which is unusual for her.  She did have a normal bowel movement yesterday.  She had a KUB at [address] [place] [address] internal medicine which did not suggest any stone.    She is never had gastric ulcer or gastritis, but has had nephrolithiasis in the past.  Usually her pain with nephrolithiasis is lower than it does occur in the back as well.      Denies any vomiting or blood in her stool    HPI      ROS:  Review of Systems   Constitutional: Positive for activity change and appetite change.   Gastrointestinal: Positive for abdominal pain and constipation. Negative for anal bleeding, blood in stool, nausea and vomiting.   Psychiatric/Behavioral: Positive for sleep disturbance.      See above      Current Outpatient Medications:   •  amLODIPine (NORVASC) 10 MG tablet, TAKE 1 TABLET BY MOUTH EVERY DAY, Disp: 90 tablet, Rfl: 3  •  aspirin 81 MG EC tablet, Take 1 tablet by mouth Daily., Disp: 30 tablet, Rfl: 5  •  atorvastatin (LIPITOR) 40 MG tablet, TAKE 1 TABLET BY MOUTH EVERY OTHER DAY, Disp: 45 tablet, Rfl: 1  •  ciprofloxacin (CIPRO) 250 MG tablet, Take 250 mg by mouth 2 (Two) Times a Day. FOR 7 DAYS, Disp: , Rfl:   •  lisinopril (PRINIVIL,ZESTRIL) 20 MG tablet, Take 1 tablet by mouth 2 (Two) Times a Day., Disp: 180 tablet, Rfl: 1  •  metFORMIN  (GLUCOPHAGE) 500 MG tablet, Take 2 tablets by mouth 2 (Two) Times a Day With Meals., Disp: 360 tablet, Rfl: 1  •  sertraline (ZOLOFT) 100 MG tablet, Take 1 tablet by mouth Daily., Disp: 90 tablet, Rfl: 2  •  pantoprazole (Protonix) 40 MG EC tablet, Take 1 tablet by mouth Daily., Disp: 30 tablet, Rfl: 1    Lab Results   Component Value Date    GLUCOSE 153 (H) 07/23/2020    BUN 14 07/23/2020    CREATININE 0.54 07/23/2020    EGFRIFNONA 112 07/23/2020    BCR 25.9 (H) 07/23/2020    K 4.2 07/23/2020    CO2 31.0 07/23/2020    CALCIUM 9.3 07/23/2020    ALBUMIN 4.30 07/23/2020    AST 26 07/23/2020    ALT 26 07/23/2020       WBC   Date Value Ref Range Status   07/23/2020 6.50 3.40 - 10.80 10*3/mm3 Final     RBC   Date Value Ref Range Status   07/23/2020 4.63 3.77 - 5.28 10*6/mm3 Final     Hemoglobin   Date Value Ref Range Status   07/23/2020 13.0 12.0 - 15.9 g/dL Final     Hematocrit   Date Value Ref Range Status   07/23/2020 38.6 34.0 - 46.6 % Final     MCV   Date Value Ref Range Status   07/23/2020 83.4 79.0 - 97.0 fL Final     MCH   Date Value Ref Range Status   07/23/2020 28.1 26.6 - 33.0 pg Final     MCHC   Date Value Ref Range Status   07/23/2020 33.7 31.5 - 35.7 g/dL Final     RDW   Date Value Ref Range Status   07/23/2020 11.9 (L) 12.3 - 15.4 % Final     MPV   Date Value Ref Range Status   07/23/2020 8.4 6.0 - 12.0 fL Final     Platelets   Date Value Ref Range Status   07/23/2020 232 140 - 450 10*3/mm3 Final     Neutrophil %   Date Value Ref Range Status   07/23/2020 59.2 42.7 - 76.0 % Final     Lymphocyte %   Date Value Ref Range Status   07/23/2020 31.1 19.6 - 45.3 % Final     Neutrophils, Absolute   Date Value Ref Range Status   07/23/2020 3.90 1.70 - 7.00 10*3/mm3 Final     Lymphocytes, Absolute   Date Value Ref Range Status   07/23/2020 2.00 0.70 - 3.10 10*3/mm3 Final         OBJECTIVE:  Visit Vitals  /60 (BP Location: Left arm, Patient Position: Sitting, Cuff Size: Adult)   Pulse 95   Temp 97.2 °F (36.2  "°C) (Oral)   Resp 15   Ht 160 cm (62.99\")   Wt 70.3 kg (155 lb)   SpO2 98%   BMI 27.46 kg/m²      Physical Exam  Vitals reviewed.   Constitutional:       General: She is not in acute distress.     Appearance: Normal appearance. She is well-developed. She is ill-appearing. She is not diaphoretic.      Comments: No acute distress but does appear uncomfortable   HENT:      Head: Normocephalic and atraumatic.   Eyes:      Pupils: Pupils are equal, round, and reactive to light.   Neck:      Thyroid: No thyromegaly.      Trachea: Phonation normal.   Cardiovascular:      Rate and Rhythm: Normal rate and regular rhythm.      Heart sounds: No murmur heard.   No gallop.    Pulmonary:      Effort: No respiratory distress.   Abdominal:      General: Abdomen is flat. Bowel sounds are normal. There is no distension.      Palpations: Abdomen is soft.      Tenderness: There is abdominal tenderness in the left upper quadrant. There is left CVA tenderness. There is no right CVA tenderness.       Skin:     Coloration: Skin is not pale.      Findings: No erythema.   Neurological:      Mental Status: She is alert.   Psychiatric:         Behavior: Behavior normal.         Thought Content: Thought content normal.         Judgment: Judgment normal.         Assessment/Plan    Diagnoses and all orders for this visit:    1. Nephrolithiasis (Primary)  -     CT abdomen pelvis stone protocol; Future  -     Urinalysis With Culture If Indicated -; Future  -     CBC (No Diff); Future  -     Comprehensive metabolic panel; Future    2. Left upper quadrant abdominal pain  -     CT abdomen pelvis stone protocol; Future  -     Urinalysis With Culture If Indicated -; Future  -     CBC (No Diff); Future  -     Comprehensive metabolic panel; Future  -     Lipase; Future  -     pantoprazole (Protonix) 40 MG EC tablet; Take 1 tablet by mouth Daily.  Dispense: 30 tablet; Refill: 1      Based on her symptoms I suspect she has nephrolithiasis or possibly upper " urinary tract infection such as pyelonephritis.  I would continue with her ciprofloxacin.  Repeat urinalysis today although Vernell suggested because it due to her already being on antibiotics.  We have been able to get her a CT of her abdomen pelvis today.  Also possible would be gastric ulcer or gastritis.    As of the writing of this note we have the CT results.  There is no evidence of obstructive uropathy on either side.  There is fatty infiltration of the right hip muscle, but she is not having any symptoms in this area.  There is also mild fecal stasis.    Based on these results are going to refer to gastroenterology to consider getting EGD given the amount of pain she is experiencing.  We will also notify Rosalba of these results.    No follow-ups on file.      TALIA Lovett MD  10:27 CDT  9/22/2021

## 2021-09-24 ENCOUNTER — HOSPITAL ENCOUNTER (EMERGENCY)
Facility: HOSPITAL | Age: 71
Discharge: HOME | End: 2021-09-24
Attending: EMERGENCY MEDICINE | Admitting: EMERGENCY MEDICINE

## 2021-09-24 VITALS
OXYGEN SATURATION: 98 % | WEIGHT: 152 LBS | RESPIRATION RATE: 16 BRPM | DIASTOLIC BLOOD PRESSURE: 78 MMHG | HEART RATE: 98 BPM | TEMPERATURE: 98.2 F | HEIGHT: 63 IN | BODY MASS INDEX: 26.93 KG/M2 | SYSTOLIC BLOOD PRESSURE: 142 MMHG

## 2021-09-24 DIAGNOSIS — B02.9 HERPES ZOSTER WITHOUT COMPLICATION: Primary | ICD-10-CM

## 2021-09-24 PROCEDURE — 99282 EMERGENCY DEPT VISIT SF MDM: CPT

## 2021-09-24 RX ORDER — METHYLPREDNISOLONE 4 MG/1
TABLET ORAL
Qty: 21 TABLET | Refills: 0 | Status: SHIPPED | OUTPATIENT
Start: 2021-09-24 | End: 2021-10-13

## 2021-09-24 RX ORDER — FAMCICLOVIR 500 MG/1
500 TABLET ORAL 3 TIMES DAILY
Qty: 21 TABLET | Refills: 0 | Status: SHIPPED | OUTPATIENT
Start: 2021-09-24 | End: 2021-12-06

## 2021-09-24 RX ORDER — HYDROCODONE BITARTRATE AND ACETAMINOPHEN 5; 325 MG/1; MG/1
1 TABLET ORAL EVERY 6 HOURS PRN
Qty: 12 TABLET | Refills: 0 | Status: SHIPPED | OUTPATIENT
Start: 2021-09-24 | End: 2021-12-06

## 2021-09-24 NOTE — ED PROVIDER NOTES
Subjective   This is a 71-year-old lady who was having abdominal pain for the past 1 week which is persistent in nature.  She has had a CT scan of the abdomen pelvis performed which is negative and negative CBC negative chemistry and negative urinalysis.  Today the pain was worse she came to the ED she is also noted some rash on her left flank area that is the area that she has been hurting on.  The pain is reducible on some kind of movements on touching the skin.  There is no vomiting there is no fever there is no diarrhea associate with this.  There is no dysuria urgency or frequency associate with this.  Examination reveals a dermatomal rash in dermatome configuration consistent with shingles.      Abdominal Pain  Pain location:  L flank  Pain quality: burning and throbbing    Pain radiates to:  Does not radiate  Pain severity:  Moderate  Onset quality:  Gradual  Timing:  Constant  Progression:  Waxing and waning  Chronicity:  New  Context: not alcohol use, not awakening from sleep, not diet changes, not eating, not medication withdrawal, not previous surgeries, not recent illness, not sick contacts, not suspicious food intake and not trauma    Relieved by:  Nothing  Worsened by:  Nothing  Ineffective treatments:  None tried  Associated symptoms: no anorexia, no belching, no chest pain, no chills, no constipation, no cough, no diarrhea, no dysuria, no fatigue, no fever, no flatus, no hematochezia, no hematuria, no melena, no nausea, no shortness of breath, no sore throat and no vomiting    Risk factors: no alcohol abuse, no aspirin use, no NSAID use and no recent hospitalization    Flank Pain  Associated symptoms: no anorexia, no belching, no chest pain, no chills, no constipation, no cough, no diarrhea, no dysuria, no fatigue, no fever, no flatus, no hematochezia, no hematuria, no melena, no nausea, no shortness of breath, no sore throat and no vomiting    Rash  Associated symptoms: abdominal pain    Associated  symptoms: no diarrhea, no fatigue, no fever, no nausea, no shortness of breath, no sore throat and not vomiting        Review of Systems   Constitutional: Negative.  Negative for chills, fatigue and fever.   HENT: Negative.  Negative for sore throat.    Eyes: Negative.    Respiratory: Negative.  Negative for cough and shortness of breath.    Cardiovascular: Negative.  Negative for chest pain.   Gastrointestinal: Positive for abdominal pain. Negative for anorexia, constipation, diarrhea, flatus, hematochezia, melena, nausea and vomiting.   Endocrine: Negative.    Genitourinary: Positive for flank pain. Negative for dysuria and hematuria.   Skin: Positive for rash.   Neurological: Negative.    Hematological: Negative.    All other systems reviewed and are negative.      Past Medical History:   Diagnosis Date   • Anxiety    • Arthritis    • Depression    • Hyperlipidemia    • Hypertension    • Polio    • Stroke (CMS/HCC)    • Type 2 diabetes mellitus (CMS/HCC)        Allergies   Allergen Reactions   • Erythromycin Unknown (See Comments)   • Ibuprofen Unknown (See Comments)   • Moxifloxacin Unknown (See Comments)       Past Surgical History:   Procedure Laterality Date   • ANKLE FUSION Right    • CERVICAL SPINE SURGERY     •  SECTION         Family History   Problem Relation Age of Onset   • Cancer Mother    • Breast cancer Mother    • Heart disease Father    • Diabetes Sister    • Diabetes Maternal Uncle    • No Known Problems Brother    • No Known Problems Daughter    • No Known Problems Son    • No Known Problems Maternal Grandmother    • No Known Problems Paternal Grandmother    • No Known Problems Maternal Aunt    • No Known Problems Paternal Aunt    • No Known Problems Other    • BRCA 1/2 Neg Hx    • Colon cancer Neg Hx    • Endometrial cancer Neg Hx    • Ovarian cancer Neg Hx        Social History     Socioeconomic History   • Marital status: Single     Spouse name: Not on file   • Number of  children: Not on file   • Years of education: Not on file   • Highest education level: Not on file   Tobacco Use   • Smoking status: Never Smoker   • Smokeless tobacco: Never Used   Substance and Sexual Activity   • Alcohol use: No   • Drug use: No   • Sexual activity: Defer           Objective   Physical Exam  Vitals and nursing note reviewed. Exam conducted with a chaperone present.   Constitutional:       General: She is awake. She is not in acute distress.     Appearance: Normal appearance. She is well-developed. She is not toxic-appearing.   HENT:      Head: Normocephalic and atraumatic.      Nose:      Right Nostril: No epistaxis.      Left Nostril: No epistaxis.   Eyes:      General: Lids are normal. No scleral icterus.     Conjunctiva/sclera: Conjunctivae normal.      Pupils: Pupils are equal, round, and reactive to light.   Neck:      Vascular: No hepatojugular reflux or JVD.   Cardiovascular:      Rate and Rhythm: Normal rate and regular rhythm.      Chest Wall: PMI is not displaced.      Pulses: Normal pulses. No decreased pulses.      Heart sounds: Normal heart sounds. No murmur heard.     Pulmonary:      Effort: Pulmonary effort is normal. No accessory muscle usage or respiratory distress.      Breath sounds: Normal breath sounds. No decreased breath sounds or wheezing.   Abdominal:      General: Abdomen is flat. Bowel sounds are normal. There is no distension or abdominal bruit.      Palpations: Abdomen is soft. There is no shifting dullness, fluid wave, mass or pulsatile mass.      Tenderness: There is no abdominal tenderness. There is no right CVA tenderness, left CVA tenderness, guarding or rebound. Negative signs include Rovsing's sign and psoas sign.      Hernia: No hernia is present.   Musculoskeletal:         General: Normal range of motion.      Cervical back: Normal range of motion and neck supple. No rigidity.      Right lower leg: No edema.      Left lower leg: No edema.   Skin:     General:  Skin is warm and dry.      Capillary Refill: Capillary refill takes less than 2 seconds.      Coloration: Skin is not cyanotic, jaundiced, mottled or pale.      Findings: Rash present. Rash is papular and vesicular. Rash is not crusting, nodular, purpuric, pustular, scaling or urticarial.   Neurological:      General: No focal deficit present.      Mental Status: She is alert and oriented to person, place, and time. Mental status is at baseline.      GCS: GCS eye subscore is 4. GCS verbal subscore is 5. GCS motor subscore is 6.      Cranial Nerves: Cranial nerves are intact. No cranial nerve deficit.      Sensory: Sensation is intact.      Motor: Motor function is intact.      Deep Tendon Reflexes: Reflexes are normal and symmetric.   Psychiatric:         Behavior: Behavior normal. Behavior is cooperative.         Procedures           ED Course  ED Course as of Sep 24 1213   Fri Sep 24, 2021   1158 Patient has shingles I have discussed this with her and will discharge her home    [TS]   1158 Risks and benefits of treatments given and alternative treatment options discussed with patient/family. I answered all the questions in simple, plain language, and there was voiced understanding and agreement with plan of care. There were no further questions. Differential diagnosis discussed. Patient/family was advised that the practice of medicine is not always an exact science, and sometimes tests, physical exam, or history may not show the underlying conditions with certainty. Additionally, the condition may change or show itself later after initial presentation. There was also expressed understanding and agreement with this limitation of emergency medicine practice. Patient/family was asked to return to ED if any problem or issues or if condition worsens or does not improved. Patient/family agreed to follow up with PCP/specialist as advised, or return to ED if unable to see a provider in a timely fashion for continued  symptoms.     [TS]   1212 I have discussed the case with the patient and offered that we can go ahead and retest everything if she wants us to but the likelihood of that showing something new is unusual since her recent work-up has been negative    [TS]      ED Course User Index  [TS] Chuck Scruggs MD                                           [facility]    Final diagnoses:   Herpes zoster without complication       ED Disposition  ED Disposition     ED Disposition Condition Comment    Discharge Stable           LENI Lovett MD  [address] [facility] [address]  [place] [id number] [phone]    Schedule an appointment as soon as possible for a visit            Medication List      New Prescriptions    famciclovir 500 MG tablet  Commonly known as: FAMVIR  Take 1 tablet by mouth 3 (Three) Times a Day.     HYDROcodone-acetaminophen 5-325 MG per tablet  Commonly known as: NORCO  Take 1 tablet by mouth Every 6 (Six) Hours As Needed for Moderate Pain .     methylPREDNISolone 4 MG dose pack  Commonly known as: MEDROL  Take as directed on package instructions.           Where to Get Your Medications      These medications were sent to [facility]/pharmacy #[id number] - [place], [place] - [address] [facility] [phone]  - [phone]   [address] [facility] [address]    Phone: [phone]   · famciclovir 500 MG tablet  · HYDROcodone-acetaminophen 5-325 MG per tablet  · methylPREDNISolone 4 MG dose pack          Chuck Scruggs MD  09/24/21 1200       Chuck Scruggs MD  09/24/21 121

## 2021-09-27 ENCOUNTER — PATIENT OUTREACH (OUTPATIENT)
Dept: CASE MANAGEMENT | Facility: OTHER | Age: 71
End: 2021-09-27

## 2021-09-27 NOTE — OUTREACH NOTE
"Ambulatory Case Management Note    Patient Outreach    Lilliam SINGLETON [facility] patient seen at [facility] ED 9.24.21 for herpes zoster. Spoke with patient and she is \"some better but I'm still very red around the rash\". She filled the Norco, Famvir, and Medrol pack. She is using the pain medication at  so she can sleep. She will contact PCP if needed. Educated on disease process of shingles. She has family to assist with needs.     General & Health Literacy Assessment    Questions/Answers      Most Recent Value   Assessment Completed With  Patient   Living Arrangement  Alone   Type of Residence  Private Residence   Home Care Services  No   Communication Device  Yes   Bed or Wheelchair Confined  No   Difficulty Keeping Appointments  No        Care Evaluation    Questions/Answers      Most Recent Value   Annual Wellness Visit:   -- [scheduled for 12.6.21]   Care Gaps Addressed  Colon Cancer Screening, Diabetic A1C, Diabetic Eye Exam, Flu Shot, Mammogram, Other (See Comment)   Colon Cancer Screening Type  Colonoscopy   Colonoscopy Status  Up to Date (< 10 yrs)   Colon Cancer Screening Completion at [facility] or Other  [facility]   Colon Cancer Screening Comments  completed 1.1.16   HbA1c Status  Up to Date-outside defined limits   HbA1c Completion at [facility] or Other  [facility]   HbA1c Comments  7.80 on 9.22.21   Diabetic Eye Exam Status  Up to Date   Diabetic Eye Exam Completion at [facility] or Other  Other   Diabetic Eye Exam Comments  completed 10.12.2020 with Dioni Copeland   Flu Shot Status  Up to Date   Flu Shot Completion at [facility] or Other  [facility]   Flu Shot Comments  completed 11.30.2020   Mammogram Status  Up to Date   Mammogram Completion at [facility] or Other  [facility]   Mammogram Comments  completed 11.30.2020   Care Gap Comments  completed COVID-19 vaccines 4.2.21 and 4.23.21   Other Patient Education/Resources   24/7 [facility] Nurse Call Line [provided AC contact information]   24/7 Nurse Call Line Education Method  " Verbal   Medication Adherence  Medications understood   Healthy Lifestyle (Self-Efficacy)  recognizes when to contact medical assistance, self-reports important symptoms to medical professional          SDOH updated and reviewed with the patient during this program:    Sent via [employer].      Kristi Ochoa RN  Ambulatory Case Management    9/27/2021, 15:31 CDT

## 2021-09-28 ENCOUNTER — TELEPHONE (OUTPATIENT)
Dept: INTERNAL MEDICINE | Facility: CLINIC | Age: 71
End: 2021-09-28

## 2021-09-28 NOTE — TELEPHONE ENCOUNTER
Caller: Rosalba Churchill    Relationship: Self    Best call back number: [phone]     What medication are you requesting: CREAM FOR STOMACH AND BACK     What are your current symptoms: PATIENT DIAGNOSED WITH SHINGLES     How long have you been experiencing symptoms: 09/24/2021    Have you had these symptoms before:    [] Yes  [x] No    Have you been treated for these symptoms before:   [] Yes  [x] No    If a prescription is needed, what is your preferred pharmacy and phone number: [id number] - [place] - [address] [phone] [facility] [phone]      Additional notes: PATIENT STATED THAT SHE HAS BEEN TAKING A MEDICATION FOR SHINGLES BUT WOULD LIKE TO SEE IF A CREAM CAN BE CALLED IN. PLEASE ADVISE.

## 2021-09-29 ENCOUNTER — OFFICE VISIT (OUTPATIENT)
Dept: INTERNAL MEDICINE | Facility: CLINIC | Age: 71
End: 2021-09-29

## 2021-09-29 VITALS
TEMPERATURE: 97.6 F | SYSTOLIC BLOOD PRESSURE: 148 MMHG | DIASTOLIC BLOOD PRESSURE: 92 MMHG | HEART RATE: 84 BPM | OXYGEN SATURATION: 98 %

## 2021-09-29 DIAGNOSIS — B02.9 HERPES ZOSTER WITHOUT COMPLICATION: Primary | ICD-10-CM

## 2021-09-29 PROCEDURE — 99213 OFFICE O/P EST LOW 20 MIN: CPT | Performed by: NURSE PRACTITIONER

## 2021-09-29 RX ORDER — GABAPENTIN 300 MG/1
CAPSULE ORAL
Qty: 45 CAPSULE | Refills: 0 | Status: SHIPPED | OUTPATIENT
Start: 2021-09-29 | End: 2021-12-06

## 2021-09-29 NOTE — PATIENT INSTRUCTIONS
Postherpetic Neuralgia  Postherpetic neuralgia (PHN) is nerve pain that occurs after a shingles infection. Shingles is a painful rash that appears on one area of the body, usually on the trunk or face. Shingles is caused by the varicella-zoster virus. This is the same virus that causes chickenpox. In people who have had chickenpox, the virus can resurface years later and cause shingles.  You may have PHN if you continue to have pain for 4 months after your shingles rash has gone away. PHN appears in the same area where you had the shingles rash. The pain usually goes away after the rash disappears.  Getting a vaccination for shingles can prevent PHN. This vaccine is recommended for people older than 60. It may prevent shingles, and may also lower your risk of PHN if you do get shingles.  What are the causes?  This condition is caused by damage to your nerves from the varicella-zoster virus. The damage makes your nerves overly sensitive.  What increases the risk?  The following factors may make you more likely to develop this condition:  · Being older than 60 years of age.  · Having severe pain before your shingles rash starts.  · Having a severe rash.  · Having shingles in and around the eye area.  · Having a disease that makes your body unable to fight infections (weak immune system).  What are the signs or symptoms?  The main symptom of this condition is pain. The pain may:  · Often be very bad and may be described as stabbing, burning, or feeling like an electric shock.  · Come and go or may be there all the time.  · Be triggered by light touches on the skin or changes in temperature.  You may have itching along with the pain.  How is this diagnosed?  This condition may be diagnosed based on your symptoms and your history of shingles. Lab studies and other diagnostic tests are usually not needed.  How is this treated?  There is no cure for this condition. Treatment for PHN will focus on pain relief.  Over-the-counter pain relievers do not usually relieve PHN pain. You may need to work with a pain specialist. Treatment may include:  · Antidepressant medicines to help with pain and improve sleep.  · Anti-seizure medicines to relieve nerve pain.  · Strong pain relievers (opioids).  · A numbing patch worn on the skin (lidocaine patch).  · Botox (botulinum toxin) injections to block pain signals between nerves and muscles.  · Injections of numbing medicine or anti-inflammatory medicines around irritated nerves.  Follow these instructions at home:    · It may take a long time to recover from PHN. Work closely with your health care provider and develop a good support system at home.  · Take over-the-counter and prescription medicines only as told by your health care provider.  · Do not drive or use heavy machinery while taking prescription pain medicine.  · Wear loose, comfortable clothing.  · Cover sensitive areas with a dressing to reduce friction from clothing rubbing on the area.  · If directed, put ice on the painful area:  ? Put ice in a plastic bag.  ? Place a towel between your skin and the bag.  ? Leave the ice on for 20 minutes, 2-3 times a day.  · Talk to your health care provider if you feel depressed or desperate. Living with long-term pain can be depressing.  · Keep all follow-up visits as told by your health care provider. This is important.  Contact a health care provider if:  · Your medicine is not helping.  · You are struggling to manage your pain at home.  Summary  · Postherpetic neuralgia is a very painful disorder that can occur after an episode of shingles.  · The pain is often severe, burning, electric, or stabbing.  · Prescription medicines can be helpful in managing persistent pain.  · Getting a vaccination for shingles can prevent PHN. This vaccine is recommended for people older than 60.  This information is not intended to replace advice given to you by your health care provider. Make sure  you discuss any questions you have with your health care provider.  Document Revised: 11/30/2018 Document Reviewed: 03/06/2018  Elsevier Patient Education © 2021 [employer]    Postherpetic Neuralgia  Postherpetic neuralgia (PHN) is nerve pain that occurs after a shingles infection. Shingles is a painful rash that appears on one area of the body, usually on the trunk or face. Shingles is caused by the varicella-zoster virus. This is the same virus that causes chickenpox. In people who have had chickenpox, the virus can resurface years later and cause shingles.  You may have PHN if you continue to have pain for 4 months after your shingles rash has gone away. PHN appears in the same area where you had the shingles rash. The pain usually goes away after the rash disappears.  Getting a vaccination for shingles can prevent PHN. This vaccine is recommended for people older than 60. It may prevent shingles, and may also lower your risk of PHN if you do get shingles.  What are the causes?  This condition is caused by damage to your nerves from the varicella-zoster virus. The damage makes your nerves overly sensitive.  What increases the risk?  The following factors may make you more likely to develop this condition:  · Being older than 60 years of age.  · Having severe pain before your shingles rash starts.  · Having a severe rash.  · Having shingles in and around the eye area.  · Having a disease that makes your body unable to fight infections (weak immune system).  What are the signs or symptoms?  The main symptom of this condition is pain. The pain may:  · Often be very bad and may be described as stabbing, burning, or feeling like an electric shock.  · Come and go or may be there all the time.  · Be triggered by light touches on the skin or changes in temperature.  You may have itching along with the pain.  How is this diagnosed?  This condition may be diagnosed based on your symptoms and your history of shingles.  Lab studies and other diagnostic tests are usually not needed.  How is this treated?  There is no cure for this condition. Treatment for PHN will focus on pain relief. Over-the-counter pain relievers do not usually relieve PHN pain. You may need to work with a pain specialist. Treatment may include:  · Antidepressant medicines to help with pain and improve sleep.  · Anti-seizure medicines to relieve nerve pain.  · Strong pain relievers (opioids).  · A numbing patch worn on the skin (lidocaine patch).  · Botox (botulinum toxin) injections to block pain signals between nerves and muscles.  · Injections of numbing medicine or anti-inflammatory medicines around irritated nerves.  Follow these instructions at home:    · It may take a long time to recover from PHN. Work closely with your health care provider and develop a good support system at home.  · Take over-the-counter and prescription medicines only as told by your health care provider.  · Do not drive or use heavy machinery while taking prescription pain medicine.  · Wear loose, comfortable clothing.  · Cover sensitive areas with a dressing to reduce friction from clothing rubbing on the area.  · If directed, put ice on the painful area:  ? Put ice in a plastic bag.  ? Place a towel between your skin and the bag.  ? Leave the ice on for 20 minutes, 2-3 times a day.  · Talk to your health care provider if you feel depressed or desperate. Living with long-term pain can be depressing.  · Keep all follow-up visits as told by your health care provider. This is important.  Contact a health care provider if:  · Your medicine is not helping.  · You are struggling to manage your pain at home.  Summary  · Postherpetic neuralgia is a very painful disorder that can occur after an episode of shingles.  · The pain is often severe, burning, electric, or stabbing.  · Prescription medicines can be helpful in managing persistent pain.  · Getting a vaccination for shingles can  prevent PHN. This vaccine is recommended for people older than 60.  This information is not intended to replace advice given to you by your health care provider. Make sure you discuss any questions you have with your health care provider.  Document Revised: 11/30/2018 Document Reviewed: 03/06/2018  Elsevier Patient Education © 2021 Elsevier Inc.

## 2021-09-29 NOTE — PROGRESS NOTES
"Chief Complaint   Patient presents with   • Rash     on stomach and around back         History:  Rosalba Churchill is a 71 y.o. female who presents today for evaluation of the above problems.          Seen here last week for flank pain, ended up breaking out in a rash on left side and going to ER on .  Diagnosed with shingles.  Is having a lot of pain, \"deep inside\".  Using steroid pack, Famvir, and Norco as prescribed by ER. States she is in severe pain in the area of the rash.    Has felt achy, tired, no fever.      ROS:  Review of Systems  As above    Allergies   Allergen Reactions   • Erythromycin Unknown (See Comments)   • Ibuprofen Unknown (See Comments)   • Moxifloxacin Unknown (See Comments)     Past Medical History:   Diagnosis Date   • Anxiety    • Arthritis    • Depression    • Hyperlipidemia    • Hypertension    • Polio    • Stroke (CMS/HCC)    • Type 2 diabetes mellitus (CMS/HCC)      Past Surgical History:   Procedure Laterality Date   • ANKLE FUSION Right    • CERVICAL SPINE SURGERY     •  SECTION       Family History   Problem Relation Age of Onset   • Cancer Mother    • Breast cancer Mother    • Heart disease Father    • Diabetes Sister    • Diabetes Maternal Uncle    • No Known Problems Brother    • No Known Problems Daughter    • No Known Problems Son    • No Known Problems Maternal Grandmother    • No Known Problems Paternal Grandmother    • No Known Problems Maternal Aunt    • No Known Problems Paternal Aunt    • No Known Problems Other    • BRCA 1/2 Neg Hx    • Colon cancer Neg Hx    • Endometrial cancer Neg Hx    • Ovarian cancer Neg Hx      Rosalba  reports that she has never smoked. She has never used smokeless tobacco. She reports that she does not drink alcohol and does not use drugs.    I have reviewed and updated the above documentation (if necessary) including but not limited to chief complaint, ROS, PFSH, allergies and medications        Current Outpatient " Medications:   •  amLODIPine (NORVASC) 10 MG tablet, TAKE 1 TABLET BY MOUTH EVERY DAY, Disp: 90 tablet, Rfl: 3  •  aspirin 81 MG EC tablet, Take 1 tablet by mouth Daily., Disp: 30 tablet, Rfl: 5  •  atorvastatin (LIPITOR) 40 MG tablet, TAKE 1 TABLET BY MOUTH EVERY OTHER DAY, Disp: 45 tablet, Rfl: 1  •  famciclovir (FAMVIR) 500 MG tablet, Take 1 tablet by mouth 3 (Three) Times a Day., Disp: 21 tablet, Rfl: 0  •  HYDROcodone-acetaminophen (NORCO) 5-325 MG per tablet, Take 1 tablet by mouth Every 6 (Six) Hours As Needed for Moderate Pain ., Disp: 12 tablet, Rfl: 0  •  lisinopril (PRINIVIL,ZESTRIL) 20 MG tablet, Take 1 tablet by mouth 2 (Two) Times a Day., Disp: 180 tablet, Rfl: 1  •  metFORMIN (GLUCOPHAGE) 500 MG tablet, Take 2 tablets by mouth 2 (Two) Times a Day With Meals., Disp: 360 tablet, Rfl: 1  •  methylPREDNISolone (MEDROL) 4 MG dose pack, Take as directed on package instructions., Disp: 21 tablet, Rfl: 0  •  sertraline (ZOLOFT) 100 MG tablet, Take 1 tablet by mouth Daily., Disp: 90 tablet, Rfl: 2  •  gabapentin (Neurontin) 300 MG capsule, Take one daily for 1 day, then twice a day for 1 day, then 3x/day., Disp: 45 capsule, Rfl: 0        PHQ-9 Total Score:      OBJECTIVE:  Visit Vitals  /92 (BP Location: Right arm, Patient Position: Sitting, Cuff Size: Adult)   Pulse 84   Temp 97.6 °F (36.4 °C) (Oral)   SpO2 98%      Physical Exam  Vitals and nursing note reviewed.   Constitutional:       General: She is not in acute distress.     Appearance: Normal appearance. She is not ill-appearing, toxic-appearing or diaphoretic.   Cardiovascular:      Rate and Rhythm: Regular rhythm.      Heart sounds: Normal heart sounds.   Pulmonary:      Effort: Pulmonary effort is normal. No respiratory distress.      Breath sounds: Normal breath sounds. No stridor. No wheezing or rales.   Abdominal:      General: There is no distension.      Palpations: Abdomen is soft.      Tenderness: There is no abdominal tenderness.    Musculoskeletal:      Right lower leg: No edema.      Left lower leg: No edema.   Skin:     General: Skin is warm and dry.      Findings: Rash (diffuse herpetic rash in one dermatome left flank area, multiple vesicular and crusting lesions from spine to abdomen on left side; extensive rash) present.             Comments: Diffuse herpetic rash in zone marked above   Neurological:      Mental Status: She is alert and oriented to person, place, and time.   Psychiatric:         Mood and Affect: Mood normal.         Behavior: Behavior normal.         Thought Content: Thought content normal.         Judgment: Judgment normal.         [facility]    Assessment/Plan    Diagnoses and all orders for this visit:    1. Herpes zoster without complication (Primary)  -     gabapentin (Neurontin) 300 MG capsule; Take one daily for 1 day, then twice a day for 1 day, then 3x/day.  Dispense: 45 capsule; Refill: 0    Will go ahead and begin Neurontin due to severe pain.  Will try for 2 weeks and have a follow up.  I did tell her I do not want her to stop the Neurontin abruptly; we will need to taper her off.  I'm hoping she will only need it for 2 weeks' duration, but we will re-assess at follow up.      Education materials and an After Visit Summary were printed and given to the patient at discharge.  Return in about 2 weeks (around 10/13/2021) for Recheck.         JOHN Nava   11:05 CDT  9/29/2021

## 2021-10-03 DIAGNOSIS — I10 ESSENTIAL HYPERTENSION: ICD-10-CM

## 2021-10-04 RX ORDER — AMLODIPINE BESYLATE 10 MG/1
TABLET ORAL
Qty: 90 TABLET | Refills: 3 | Status: SHIPPED | OUTPATIENT
Start: 2021-10-04 | End: 2022-09-19 | Stop reason: SDUPTHER

## 2021-10-13 ENCOUNTER — OFFICE VISIT (OUTPATIENT)
Dept: INTERNAL MEDICINE | Facility: CLINIC | Age: 71
End: 2021-10-13

## 2021-10-13 VITALS
WEIGHT: 152 LBS | OXYGEN SATURATION: 95 % | BODY MASS INDEX: 26.93 KG/M2 | HEART RATE: 82 BPM | DIASTOLIC BLOOD PRESSURE: 66 MMHG | HEIGHT: 63 IN | SYSTOLIC BLOOD PRESSURE: 142 MMHG

## 2021-10-13 DIAGNOSIS — B02.9 HERPES ZOSTER WITHOUT COMPLICATION: Primary | ICD-10-CM

## 2021-10-13 PROCEDURE — 99213 OFFICE O/P EST LOW 20 MIN: CPT | Performed by: NURSE PRACTITIONER

## 2021-10-14 DIAGNOSIS — R10.12 LEFT UPPER QUADRANT ABDOMINAL PAIN: ICD-10-CM

## 2021-10-14 NOTE — PROGRESS NOTES
Chief Complaint   Patient presents with   • Follow-up   • Herpes Zoster         History:  Rosalba Churchill is a 71 y.o. female who presents today for evaluation of the above problems.          She is here for follow up on left flank shingles.  She has completed a medrol dose pack and anti-viral.  She has been taking Neurontin for severe pain at the time of the shingles outbreak.  She has not had to take more than one at night, and her pain is much improved.  She is planning on stopping this in another few days.  Overall, she feels great and has no new complaints.  She says she feels better than she has in months.      ROS:  Review of Systems  As above.    Allergies   Allergen Reactions   • Erythromycin Unknown (See Comments)   • Ibuprofen Unknown (See Comments)   • Moxifloxacin Unknown (See Comments)     Past Medical History:   Diagnosis Date   • Anxiety    • Arthritis    • Depression    • Hyperlipidemia    • Hypertension    • Polio    • Stroke (HCC)    • Type 2 diabetes mellitus (HCC)      Past Surgical History:   Procedure Laterality Date   • ANKLE FUSION Right    • CERVICAL SPINE SURGERY     •  SECTION       Family History   Problem Relation Age of Onset   • Cancer Mother    • Breast cancer Mother    • Heart disease Father    • Diabetes Sister    • Diabetes Maternal Uncle    • No Known Problems Brother    • No Known Problems Daughter    • No Known Problems Son    • No Known Problems Maternal Grandmother    • No Known Problems Paternal Grandmother    • No Known Problems Maternal Aunt    • No Known Problems Paternal Aunt    • No Known Problems Other    • BRCA 1/2 Neg Hx    • Colon cancer Neg Hx    • Endometrial cancer Neg Hx    • Ovarian cancer Neg Hx      Rosalba  reports that she has never smoked. She has never used smokeless tobacco. She reports that she does not drink alcohol and does not use drugs.    I have reviewed and updated the above documentation (if necessary) including but not limited  "to chief complaint, ROS, PFSH, allergies and medications        Current Outpatient Medications:   •  amLODIPine (NORVASC) 10 MG tablet, TAKE 1 TABLET BY MOUTH EVERY DAY, Disp: 90 tablet, Rfl: 3  •  aspirin 81 MG EC tablet, Take 1 tablet by mouth Daily., Disp: 30 tablet, Rfl: 5  •  atorvastatin (LIPITOR) 40 MG tablet, TAKE 1 TABLET BY MOUTH EVERY OTHER DAY, Disp: 45 tablet, Rfl: 1  •  famciclovir (FAMVIR) 500 MG tablet, Take 1 tablet by mouth 3 (Three) Times a Day., Disp: 21 tablet, Rfl: 0  •  gabapentin (Neurontin) 300 MG capsule, Take one daily for 1 day, then twice a day for 1 day, then 3x/day., Disp: 45 capsule, Rfl: 0  •  HYDROcodone-acetaminophen (NORCO) 5-325 MG per tablet, Take 1 tablet by mouth Every 6 (Six) Hours As Needed for Moderate Pain ., Disp: 12 tablet, Rfl: 0  •  lisinopril (PRINIVIL,ZESTRIL) 20 MG tablet, Take 1 tablet by mouth 2 (Two) Times a Day., Disp: 180 tablet, Rfl: 1  •  metFORMIN (GLUCOPHAGE) 500 MG tablet, Take 2 tablets by mouth 2 (Two) Times a Day With Meals., Disp: 360 tablet, Rfl: 1  •  sertraline (ZOLOFT) 100 MG tablet, Take 1 tablet by mouth Daily., Disp: 90 tablet, Rfl: 2        PHQ-9 Total Score:      OBJECTIVE:  Visit Vitals  /66 (BP Location: Right arm, Patient Position: Sitting, Cuff Size: Adult)   Pulse 82   Ht 160 cm (63\")   Wt 68.9 kg (152 lb)   SpO2 95%   BMI 26.93 kg/m²      Physical Exam  Vitals and nursing note reviewed.   Constitutional:       General: She is not in acute distress.     Appearance: Normal appearance. She is not ill-appearing, toxic-appearing or diaphoretic.   HENT:      Head: Normocephalic and atraumatic.   Cardiovascular:      Rate and Rhythm: Regular rhythm.      Heart sounds: Normal heart sounds.   Pulmonary:      Effort: Pulmonary effort is normal. No respiratory distress.      Breath sounds: Normal breath sounds. No stridor. No wheezing, rhonchi or rales.   Abdominal:      General: There is no distension.      Palpations: Abdomen is soft. There " is no mass.      Tenderness: There is no abdominal tenderness.   Musculoskeletal:      Cervical back: Neck supple.      Right lower leg: No edema.      Left lower leg: No edema.   Lymphadenopathy:      Cervical: No cervical adenopathy.   Skin:     General: Skin is warm and dry.      Findings: Rash (faint discolorations from zoster lesions left back/flank/abdomen, otherwise normal) present.   Neurological:      Mental Status: She is alert and oriented to person, place, and time.   Psychiatric:         Mood and Affect: Mood normal.         Behavior: Behavior normal.         Thought Content: Thought content normal.         Judgment: Judgment normal.         [facility]    Assessment/Plan    Diagnoses and all orders for this visit:    1. Herpes zoster without complication (Primary)    Continue Neurontin for a few more days, then discontinue.  I'm hoping she will not have any post-herpetic neuralgia since she has so much improvement and pain is almost completely resolved at this point.  Asked her to let us know if anything changes.          Education materials and an After Visit Summary were printed and given to the patient at discharge.  Return for Next scheduled follow up, If any problems.         JOHN Nava   07:40 CDT  10/14/2021

## 2021-10-15 RX ORDER — PANTOPRAZOLE SODIUM 40 MG/1
40 TABLET, DELAYED RELEASE ORAL DAILY
Qty: 30 TABLET | Refills: 1 | OUTPATIENT
Start: 2021-10-15

## 2021-12-06 ENCOUNTER — OFFICE VISIT (OUTPATIENT)
Dept: INTERNAL MEDICINE | Facility: CLINIC | Age: 71
End: 2021-12-06

## 2021-12-06 VITALS
SYSTOLIC BLOOD PRESSURE: 145 MMHG | BODY MASS INDEX: 27.82 KG/M2 | RESPIRATION RATE: 15 BRPM | OXYGEN SATURATION: 98 % | HEART RATE: 86 BPM | DIASTOLIC BLOOD PRESSURE: 70 MMHG | HEIGHT: 63 IN | WEIGHT: 157 LBS | TEMPERATURE: 98.2 F

## 2021-12-06 DIAGNOSIS — Z12.31 ENCOUNTER FOR SCREENING MAMMOGRAM FOR MALIGNANT NEOPLASM OF BREAST: ICD-10-CM

## 2021-12-06 DIAGNOSIS — Z23 NEED FOR INFLUENZA VACCINATION: ICD-10-CM

## 2021-12-06 DIAGNOSIS — Z78.0 ENCOUNTER FOR OSTEOPOROSIS SCREENING IN ASYMPTOMATIC POSTMENOPAUSAL PATIENT: ICD-10-CM

## 2021-12-06 DIAGNOSIS — E78.2 MIXED HYPERLIPIDEMIA: ICD-10-CM

## 2021-12-06 DIAGNOSIS — I10 ESSENTIAL HYPERTENSION: ICD-10-CM

## 2021-12-06 DIAGNOSIS — E11.59 TYPE 2 DIABETES MELLITUS WITH OTHER CIRCULATORY COMPLICATION, WITHOUT LONG-TERM CURRENT USE OF INSULIN (HCC): ICD-10-CM

## 2021-12-06 DIAGNOSIS — Z13.820 ENCOUNTER FOR OSTEOPOROSIS SCREENING IN ASYMPTOMATIC POSTMENOPAUSAL PATIENT: ICD-10-CM

## 2021-12-06 DIAGNOSIS — Z00.00 MEDICARE ANNUAL WELLNESS VISIT, SUBSEQUENT: Primary | ICD-10-CM

## 2021-12-06 DIAGNOSIS — Z91.81 AT HIGH RISK FOR FALLS: ICD-10-CM

## 2021-12-06 DIAGNOSIS — Z12.11 COLON CANCER SCREENING: ICD-10-CM

## 2021-12-06 PROCEDURE — 1170F FXNL STATUS ASSESSED: CPT | Performed by: INTERNAL MEDICINE

## 2021-12-06 PROCEDURE — 1125F AMNT PAIN NOTED PAIN PRSNT: CPT | Performed by: INTERNAL MEDICINE

## 2021-12-06 PROCEDURE — 96160 PT-FOCUSED HLTH RISK ASSMT: CPT | Performed by: INTERNAL MEDICINE

## 2021-12-06 PROCEDURE — G0008 ADMIN INFLUENZA VIRUS VAC: HCPCS | Performed by: INTERNAL MEDICINE

## 2021-12-06 PROCEDURE — 90662 IIV NO PRSV INCREASED AG IM: CPT | Performed by: INTERNAL MEDICINE

## 2021-12-06 PROCEDURE — G0439 PPPS, SUBSEQ VISIT: HCPCS | Performed by: INTERNAL MEDICINE

## 2021-12-06 PROCEDURE — 1159F MED LIST DOCD IN RCRD: CPT | Performed by: INTERNAL MEDICINE

## 2021-12-06 RX ORDER — CYCLOBENZAPRINE HCL 5 MG
5 TABLET ORAL 2 TIMES DAILY PRN
Qty: 30 TABLET | Refills: 0 | Status: SHIPPED | OUTPATIENT
Start: 2021-12-06

## 2021-12-06 RX ORDER — SERTRALINE HYDROCHLORIDE 100 MG/1
100 TABLET, FILM COATED ORAL DAILY
Qty: 90 TABLET | Refills: 2 | Status: SHIPPED | OUTPATIENT
Start: 2021-12-06 | End: 2022-09-13

## 2021-12-06 NOTE — PROGRESS NOTES
The ABCs of the Annual Wellness Visit  Subsequent Medicare Wellness Visit    Chief Complaint   Patient presents with   • Medicare Wellness-subsequent      Subjective    History of Present Illness:  Rosalba Churchill is a 71 y.o. female who presents for a Subsequent Medicare Wellness Visit.    Shingles are mostly gone. She hurt her back few days ago after pulling some boxes from her garage. She states the back hurts off and on for all of her life. She does not have any new weakness or radiation to her legs. No red flag symptoms. She does have weakness in left leg due to her polio. She has worsening weakness since last year      The following portions of the patient's history were reviewed and   updated as appropriate: allergies, current medications, past family history, past medical history, past social history, past surgical history and problem list.    Compared to one year ago, the patient feels her physical   health is worse due to weakness.    Compared to one year ago, the patient feels her mental   health is the same.    Recent Hospitalizations:  She was not admitted to the hospital during the last year.       Current Medical Providers:  Patient Care Team:  LENI Lovett MD as PCP - General (Hospitalist)    Outpatient Medications Prior to Visit   Medication Sig Dispense Refill   • amLODIPine (NORVASC) 10 MG tablet TAKE 1 TABLET BY MOUTH EVERY DAY 90 tablet 3   • aspirin 81 MG EC tablet Take 1 tablet by mouth Daily. 30 tablet 5   • atorvastatin (LIPITOR) 40 MG tablet TAKE 1 TABLET BY MOUTH EVERY OTHER DAY 45 tablet 1   • lisinopril (PRINIVIL,ZESTRIL) 20 MG tablet Take 1 tablet by mouth 2 (Two) Times a Day. 180 tablet 1   • metFORMIN (GLUCOPHAGE) 500 MG tablet Take 2 tablets by mouth 2 (Two) Times a Day With Meals. 360 tablet 1   • sertraline (ZOLOFT) 100 MG tablet Take 1 tablet by mouth Daily. 90 tablet 2   • famciclovir (FAMVIR) 500 MG tablet Take 1 tablet by mouth 3 (Three) Times a Day. 21 tablet 0   •  "gabapentin (Neurontin) 300 MG capsule Take one daily for 1 day, then twice a day for 1 day, then 3x/day. 45 capsule 0   • HYDROcodone-acetaminophen (NORCO) 5-325 MG per tablet Take 1 tablet by mouth Every 6 (Six) Hours As Needed for Moderate Pain . 12 tablet 0     No facility-administered medications prior to visit.       No opioid medication identified on active medication list. I have reviewed chart for other potential  high risk medication/s and harmful drug interactions in the elderly.          Aspirin is on active medication list. Aspirin use is indicated based on review of current medical condition/s. Pros and cons of this therapy have been discussed today. Benefits of this medication outweigh potential harm.  Patient has been encouraged to continue taking this medication.  .      Patient Active Problem List   Diagnosis   • Essential hypertension   • Hyperlipidemia   • Type 2 diabetes mellitus with circulatory disorder, without long-term current use of insulin (HCC)   • Stroke (HCC)   • History of poliomyelitis     Advance Care Planning  Advance Directive is not on file.  ACP discussion was held with the patient during this visit. Patient does not have an advance directive, declines further assistance.    Review of Systems   Constitutional: Positive for activity change. Negative for appetite change.   HENT: Negative.    Respiratory: Negative.    Cardiovascular: Negative.    Gastrointestinal: Negative.    Genitourinary: Negative.    Musculoskeletal: Positive for arthralgias, back pain and gait problem.   Skin: Negative.    Neurological: Positive for weakness.   Psychiatric/Behavioral: Negative.         Objective    Vitals:    12/06/21 1116   BP: 145/70   BP Location: Left arm   Patient Position: Sitting   Cuff Size: Adult   Pulse: 86   Resp: 15   Temp: 98.2 °F (36.8 °C)   TempSrc: Oral   SpO2: 98%   Weight: 71.2 kg (157 lb)   Height: 160 cm (62.99\")     BMI Readings from Last 1 Encounters:   12/06/21 27.82 " kg/m²   BMI is above normal parameters. Recommendations include: educational material    Does the patient have evidence of cognitive impairment? No    Physical Exam  Vitals reviewed.   Constitutional:       General: She is not in acute distress.     Appearance: Normal appearance. She is well-developed.   HENT:      Head: Normocephalic and atraumatic.      Mouth/Throat:      Pharynx: No oropharyngeal exudate.   Eyes:      General: No scleral icterus.     Conjunctiva/sclera: Conjunctivae normal.      Pupils: Pupils are equal, round, and reactive to light.   Neck:      Thyroid: No thyromegaly.      Vascular: No carotid bruit or JVD.   Cardiovascular:      Rate and Rhythm: Normal rate and regular rhythm.      Heart sounds: Normal heart sounds. No murmur heard.  No friction rub. No gallop.    Pulmonary:      Effort: Pulmonary effort is normal. No respiratory distress.      Breath sounds: Normal breath sounds. No stridor. No wheezing, rhonchi or rales.   Abdominal:      General: Bowel sounds are normal. There is no distension.      Palpations: Abdomen is soft.      Tenderness: There is no abdominal tenderness.   Musculoskeletal:      Cervical back: Normal.      Thoracic back: Normal.      Lumbar back: Tenderness and bony tenderness present. No spasms. No scoliosis.   Skin:     General: Skin is warm and dry.      Coloration: Skin is not jaundiced.   Neurological:      Mental Status: She is alert.      GCS: GCS eye subscore is 4. GCS verbal subscore is 5. GCS motor subscore is 6.      Cranial Nerves: No cranial nerve deficit.      Comments: Decreased strength in the right leg secondary to wearing her brace and prior poliomyelitis. Unchanged from previous. 5 out of 5 left lower extremity strength.   Psychiatric:         Mood and Affect: Mood normal.         Behavior: Behavior normal.         Thought Content: Thought content normal.         Judgment: Judgment normal.       Lab Results   Component Value Date    TRIG 87  09/22/2021    HDL 41 09/22/2021    LDL 93 09/22/2021    VLDL 17 09/22/2021    HGBA1C 7.80 (H) 09/22/2021            HEALTH RISK ASSESSMENT    Smoking Status:  Social History     Tobacco Use   Smoking Status Never Smoker   Smokeless Tobacco Never Used     Alcohol Consumption:  Social History     Substance and Sexual Activity   Alcohol Use No     Fall Risk Screen:    ADA Fall Risk Assessment was completed, and patient is at HIGH risk for falls. Assessment completed on:12/6/2021    Depression Screening:  PHQ-2/PHQ-9 Depression Screening 12/6/2021   Little interest or pleasure in doing things 0   Feeling down, depressed, or hopeless 0   Total Score 0       Health Habits and Functional and Cognitive Screening:  Functional & Cognitive Status 12/6/2021   Do you have difficulty preparing food and eating? No   Do you have difficulty bathing yourself, getting dressed or grooming yourself? No   Do you have difficulty using the toilet? No   Do you have difficulty moving around from place to place? No   Do you have trouble with steps or getting out of a bed or a chair? No   Current Diet Well Balanced Diet   Dental Exam Not up to date   Eye Exam Not up to date   Exercise (times per week) 3 times per week   Current Exercises Include House Cleaning   Current Exercise Activities Include -   Do you need help using the phone?  No   Are you deaf or do you have serious difficulty hearing?  No   Do you need help with transportation? No   Do you need help shopping? No   Do you need help preparing meals?  No   Do you need help with housework?  No   Do you need help with laundry? No   Do you need help taking your medications? No   Do you need help managing money? No   Do you ever drive or ride in a car without wearing a seat belt? No   Have you felt unusual stress, anger or loneliness in the last month? No   Who do you live with? Alone   If you need help, do you have trouble finding someone available to you? No   Have you been bothered  in the last four weeks by sexual problems? No   Do you have difficulty concentrating, remembering or making decisions? No       Age-appropriate Screening Schedule:  Refer to the list below for future screening recommendations based on patient's age, sex and/or medical conditions. Orders for these recommended tests are listed in the plan section. The patient has been provided with a written plan.    Health Maintenance   Topic Date Due   • TDAP/TD VACCINES (1 - Tdap) Never done   • ZOSTER VACCINE (1 of 2) Never done   • DIABETIC FOOT EXAM  07/09/2020   • URINE MICROALBUMIN  07/23/2021   • MAMMOGRAM  10/09/2021   • DIABETIC EYE EXAM  10/12/2021   • DXA SCAN  11/01/2021   • HEMOGLOBIN A1C  03/22/2022   • LIPID PANEL  09/22/2022   • INFLUENZA VACCINE  Completed              Assessment/Plan   CMS Preventative Services Quick Reference  Risk Factors Identified During Encounter  Chronic Pain   Fall Risk-High or Moderate  Immunizations Discussed/Encouraged (specific Immunizations; COVID19  The above risks/problems have been discussed with the patient.  Follow up actions/plans if indicated are seen below in the Assessment/Plan Section.  Pertinent information has been shared with the patient in the After Visit Summary.    Diagnoses and all orders for this visit:    1. Medicare annual wellness visit, subsequent (Primary)    2. Need for influenza vaccination  -     Fluzone High-Dose 65+yrs ([id number])    3. Type 2 diabetes mellitus with other circulatory complication, without long-term current use of insulin (HCC)    4. Mixed hyperlipidemia    5. Essential hypertension    6. Encounter for screening mammogram for malignant neoplasm of breast  -     Mammo Screening Digital Tomosynthesis Bilateral With CAD; Future    7. Colon cancer screening  -     Cologuard - Stool, Per Rectum; Future    8. Encounter for osteoporosis screening in asymptomatic postmenopausal patient  -     DEXA Bone Density Axial    9. At high risk for  falls    Other orders  -     sertraline (ZOLOFT) 100 MG tablet; Take 1 tablet by mouth Daily.  Dispense: 90 tablet; Refill: 2  -     cyclobenzaprine (FLEXERIL) 5 MG tablet; Take 1 tablet by mouth 2 (Two) Times a Day As Needed for Muscle Spasms.  Dispense: 30 tablet; Refill: 0      Reviewed her labs from September and she does not need to get them repeated at this time. Her A1c has increased slightly, and will focus more on diet. Her overall strength has declined, likely related to her prior polio. Recently injured her back. Typically, this goes away with muscle relaxers. Have sent over prescription of Flexeril today.    She is due for mammogram, DEXA scan and colon cancer screening. She would like a Cologuard rather than colonoscopy. Therefore I placed the order today.    I do recommend she get the Pfizer COVID-19 booster, and she will think about it.      Follow Up:   Return in about 6 months (around 6/6/2022) for Recheck.     An After Visit Summary and PPPS were made available to the patient.

## 2021-12-22 ENCOUNTER — HOSPITAL ENCOUNTER (OUTPATIENT)
Dept: BONE DENSITY | Facility: HOSPITAL | Age: 71
Discharge: HOME | End: 2021-12-22

## 2021-12-22 ENCOUNTER — HOSPITAL ENCOUNTER (OUTPATIENT)
Dept: MAMMOGRAPHY | Facility: HOSPITAL | Age: 71
Discharge: HOME | End: 2021-12-22

## 2021-12-22 DIAGNOSIS — Z12.31 ENCOUNTER FOR SCREENING MAMMOGRAM FOR MALIGNANT NEOPLASM OF BREAST: ICD-10-CM

## 2021-12-22 PROCEDURE — 77063 BREAST TOMOSYNTHESIS BI: CPT

## 2021-12-22 PROCEDURE — 77067 SCR MAMMO BI INCL CAD: CPT

## 2021-12-22 PROCEDURE — 77080 DXA BONE DENSITY AXIAL: CPT

## 2022-03-01 DIAGNOSIS — I10 ESSENTIAL HYPERTENSION: ICD-10-CM

## 2022-03-01 RX ORDER — LISINOPRIL 20 MG/1
20 TABLET ORAL 2 TIMES DAILY
Qty: 180 TABLET | Refills: 1 | Status: SHIPPED | OUTPATIENT
Start: 2022-03-01 | End: 2022-09-06

## 2022-05-30 DIAGNOSIS — E11.59 TYPE 2 DIABETES MELLITUS WITH OTHER CIRCULATORY COMPLICATION, WITHOUT LONG-TERM CURRENT USE OF INSULIN: ICD-10-CM

## 2022-09-04 DIAGNOSIS — I10 ESSENTIAL HYPERTENSION: ICD-10-CM

## 2022-09-06 RX ORDER — LISINOPRIL 20 MG/1
20 TABLET ORAL 2 TIMES DAILY
Qty: 180 TABLET | Refills: 1 | Status: SHIPPED | OUTPATIENT
Start: 2022-09-06 | End: 2022-12-22

## 2022-09-06 NOTE — TELEPHONE ENCOUNTER
Refills have been sent, but it looks like she canceled her June appointment and has not yet rescheduled.  Can you please have her get this follow-up visit on the schedule?  Thanks.

## 2022-09-13 RX ORDER — SERTRALINE HYDROCHLORIDE 100 MG/1
TABLET, FILM COATED ORAL
Qty: 90 TABLET | Refills: 2 | Status: SHIPPED | OUTPATIENT
Start: 2022-09-13

## 2022-09-19 ENCOUNTER — OFFICE VISIT (OUTPATIENT)
Dept: INTERNAL MEDICINE | Facility: CLINIC | Age: 72
End: 2022-09-19

## 2022-09-19 ENCOUNTER — LAB (OUTPATIENT)
Dept: LAB | Facility: HOSPITAL | Age: 72
End: 2022-09-19

## 2022-09-19 VITALS
BODY MASS INDEX: 26.93 KG/M2 | SYSTOLIC BLOOD PRESSURE: 150 MMHG | TEMPERATURE: 97.5 F | DIASTOLIC BLOOD PRESSURE: 70 MMHG | HEART RATE: 75 BPM | HEIGHT: 63 IN | OXYGEN SATURATION: 98 % | RESPIRATION RATE: 15 BRPM | WEIGHT: 152 LBS

## 2022-09-19 DIAGNOSIS — Z23 NEED FOR VACCINATION: ICD-10-CM

## 2022-09-19 DIAGNOSIS — E11.59 TYPE 2 DIABETES MELLITUS WITH OTHER CIRCULATORY COMPLICATION, WITHOUT LONG-TERM CURRENT USE OF INSULIN: ICD-10-CM

## 2022-09-19 DIAGNOSIS — E78.2 MIXED HYPERLIPIDEMIA: ICD-10-CM

## 2022-09-19 DIAGNOSIS — Z00.00 HEALTHCARE MAINTENANCE: ICD-10-CM

## 2022-09-19 DIAGNOSIS — I10 ESSENTIAL HYPERTENSION: ICD-10-CM

## 2022-09-19 DIAGNOSIS — E11.59 TYPE 2 DIABETES MELLITUS WITH OTHER CIRCULATORY COMPLICATION, WITHOUT LONG-TERM CURRENT USE OF INSULIN: Primary | ICD-10-CM

## 2022-09-19 LAB
ALBUMIN SERPL-MCNC: 4.3 G/DL (ref 3.5–5.2)
ALBUMIN UR-MCNC: 1.6 MG/DL
ALBUMIN/GLOB SERPL: 1.5 G/DL
ALP SERPL-CCNC: 122 U/L (ref 39–117)
ALT SERPL W P-5'-P-CCNC: 15 U/L (ref 1–33)
ANION GAP SERPL CALCULATED.3IONS-SCNC: 11.6 MMOL/L (ref 5–15)
AST SERPL-CCNC: 18 U/L (ref 1–32)
BILIRUB SERPL-MCNC: 0.3 MG/DL (ref 0–1.2)
BUN SERPL-MCNC: 13 MG/DL (ref 8–23)
BUN/CREAT SERPL: 23.6 (ref 7–25)
CALCIUM SPEC-SCNC: 9.2 MG/DL (ref 8.6–10.5)
CHLORIDE SERPL-SCNC: 100 MMOL/L (ref 98–107)
CHOLEST SERPL-MCNC: 279 MG/DL (ref 0–200)
CO2 SERPL-SCNC: 26.4 MMOL/L (ref 22–29)
CREAT SERPL-MCNC: 0.55 MG/DL (ref 0.57–1)
CREAT UR-MCNC: 28.1 MG/DL
DEPRECATED RDW RBC AUTO: 38.5 FL (ref 37–54)
EGFRCR SERPLBLD CKD-EPI 2021: 98.1 ML/MIN/1.73
ERYTHROCYTE [DISTWIDTH] IN BLOOD BY AUTOMATED COUNT: 11.9 % (ref 12.3–15.4)
GLOBULIN UR ELPH-MCNC: 2.9 GM/DL
GLUCOSE SERPL-MCNC: 287 MG/DL (ref 65–99)
HBA1C MFR BLD: 8.7 % (ref 4.8–5.6)
HCT VFR BLD AUTO: 41.3 % (ref 34–46.6)
HDLC SERPL-MCNC: 48 MG/DL (ref 40–60)
HGB BLD-MCNC: 13 G/DL (ref 12–15.9)
LDLC SERPL CALC-MCNC: 188 MG/DL (ref 0–100)
LDLC/HDLC SERPL: 3.88 {RATIO}
MCH RBC QN AUTO: 27.3 PG (ref 26.6–33)
MCHC RBC AUTO-ENTMCNC: 31.5 G/DL (ref 31.5–35.7)
MCV RBC AUTO: 86.8 FL (ref 79–97)
MICROALBUMIN/CREAT UR: 56.9 MG/G
PLATELET # BLD AUTO: 246 10*3/MM3 (ref 140–450)
PMV BLD AUTO: 10.2 FL (ref 6–12)
POTASSIUM SERPL-SCNC: 3.9 MMOL/L (ref 3.5–5.2)
PROT SERPL-MCNC: 7.2 G/DL (ref 6–8.5)
RBC # BLD AUTO: 4.76 10*6/MM3 (ref 3.77–5.28)
SODIUM SERPL-SCNC: 138 MMOL/L (ref 136–145)
TRIGL SERPL-MCNC: 225 MG/DL (ref 0–150)
VLDLC SERPL-MCNC: 43 MG/DL (ref 5–40)
WBC NRBC COR # BLD: 5.29 10*3/MM3 (ref 3.4–10.8)

## 2022-09-19 PROCEDURE — 82043 UR ALBUMIN QUANTITATIVE: CPT

## 2022-09-19 PROCEDURE — G0009 ADMIN PNEUMOCOCCAL VACCINE: HCPCS | Performed by: INTERNAL MEDICINE

## 2022-09-19 PROCEDURE — 80061 LIPID PANEL: CPT

## 2022-09-19 PROCEDURE — 83036 HEMOGLOBIN GLYCOSYLATED A1C: CPT

## 2022-09-19 PROCEDURE — 85027 COMPLETE CBC AUTOMATED: CPT

## 2022-09-19 PROCEDURE — 80053 COMPREHEN METABOLIC PANEL: CPT

## 2022-09-19 PROCEDURE — 82570 ASSAY OF URINE CREATININE: CPT

## 2022-09-19 PROCEDURE — 36415 COLL VENOUS BLD VENIPUNCTURE: CPT

## 2022-09-19 PROCEDURE — 90677 PCV20 VACCINE IM: CPT | Performed by: INTERNAL MEDICINE

## 2022-09-19 PROCEDURE — G0008 ADMIN INFLUENZA VIRUS VAC: HCPCS | Performed by: INTERNAL MEDICINE

## 2022-09-19 PROCEDURE — 90662 IIV NO PRSV INCREASED AG IM: CPT | Performed by: INTERNAL MEDICINE

## 2022-09-19 PROCEDURE — 99214 OFFICE O/P EST MOD 30 MIN: CPT | Performed by: INTERNAL MEDICINE

## 2022-09-19 RX ORDER — SEMAGLUTIDE 1.34 MG/ML
INJECTION, SOLUTION SUBCUTANEOUS
Qty: 1.5 ML | Refills: 5 | Status: SHIPPED | OUTPATIENT
Start: 2022-09-19 | End: 2022-12-22 | Stop reason: SDUPTHER

## 2022-09-19 RX ORDER — ATORVASTATIN CALCIUM 40 MG/1
40 TABLET, FILM COATED ORAL EVERY OTHER DAY
Qty: 45 TABLET | Refills: 3 | Status: SHIPPED | OUTPATIENT
Start: 2022-09-19 | End: 2022-12-22 | Stop reason: SDUPTHER

## 2022-09-19 RX ORDER — AMLODIPINE BESYLATE 10 MG/1
10 TABLET ORAL DAILY
Qty: 90 TABLET | Refills: 3 | Status: SHIPPED | OUTPATIENT
Start: 2022-09-19

## 2022-09-19 NOTE — PROGRESS NOTES
"Chief Complaint   Patient presents with   • Hypertension   • Follow-up         History:  Rosalba Churchill is a 72 y.o. female who presents today for evaluation of the above problems.      She presents today for follow-up.    She reports having diarrhea all the time.  She has stomach upset since being on metformin.  She also has urgency of both urine and stool and sometimes cannot hold it to get to the bathroom.  She still has her gallbladder.  She states \"metformin makes me sick\".  Her diet has not been the greatest as of late.    She tried Invokana previously but this caused yeast infections.    Blood pressure is elevated today, but she feels it is related to having to walk here from the parking lot    HPI      ROS:  Review of Systems  As above     Current Outpatient Medications:   •  amLODIPine (NORVASC) 10 MG tablet, Take 1 tablet by mouth Daily., Disp: 90 tablet, Rfl: 3  •  aspirin 81 MG EC tablet, Take 1 tablet by mouth Daily., Disp: 30 tablet, Rfl: 5  •  atorvastatin (LIPITOR) 40 MG tablet, Take 1 tablet by mouth Every Other Day., Disp: 45 tablet, Rfl: 3  •  cyclobenzaprine (FLEXERIL) 5 MG tablet, Take 1 tablet by mouth 2 (Two) Times a Day As Needed for Muscle Spasms., Disp: 30 tablet, Rfl: 0  •  lisinopril (PRINIVIL,ZESTRIL) 20 MG tablet, Take 1 tablet by mouth 2 (Two) Times a Day. (Patient taking differently: Take 20 mg by mouth Daily.), Disp: 180 tablet, Rfl: 1  •  sertraline (ZOLOFT) 100 MG tablet, TAKE 1 TABLET BY MOUTH EVERY DAY, Disp: 90 tablet, Rfl: 2  •  Semaglutide,0.25 or 0.5MG/DOS, (Ozempic, 0.25 or 0.5 MG/DOSE,) 2 MG/1.5ML solution pen-injector, Inject 0.25 mg under the skin into the appropriate area as directed 1 (One) Time Per Week for 28 days, THEN 0.5 mg 1 (One) Time Per Week for 60 days., Disp: 1.5 mL, Rfl: 5    Lab Results   Component Value Date    GLUCOSE 307 (H) 09/22/2021    BUN 13 09/22/2021    CREATININE 0.57 09/22/2021    EGFRIFNONA 105 09/22/2021    BCR 22.8 09/22/2021    K 4.1 " "09/22/2021    CO2 28.5 09/22/2021    CALCIUM 9.1 09/22/2021    ALBUMIN 4.40 09/22/2021    AST 17 09/22/2021    ALT 19 09/22/2021       WBC   Date Value Ref Range Status   09/22/2021 5.42 3.40 - 10.80 10*3/mm3 Final     RBC   Date Value Ref Range Status   09/22/2021 4.71 3.77 - 5.28 10*6/mm3 Final     Hemoglobin   Date Value Ref Range Status   09/22/2021 12.8 12.0 - 15.9 g/dL Final     Hematocrit   Date Value Ref Range Status   09/22/2021 39.6 34.0 - 46.6 % Final     MCV   Date Value Ref Range Status   09/22/2021 84.1 79.0 - 97.0 fL Final     MCH   Date Value Ref Range Status   09/22/2021 27.2 26.6 - 33.0 pg Final     MCHC   Date Value Ref Range Status   09/22/2021 32.3 31.5 - 35.7 g/dL Final     RDW   Date Value Ref Range Status   09/22/2021 12.0 (L) 12.3 - 15.4 % Final     RDW-SD   Date Value Ref Range Status   09/22/2021 35.9 (L) 37.0 - 54.0 fl Final     MPV   Date Value Ref Range Status   09/22/2021 9.9 6.0 - 12.0 fL Final     Platelets   Date Value Ref Range Status   09/22/2021 217 140 - 450 10*3/mm3 Final     Neutrophil %   Date Value Ref Range Status   07/23/2020 59.2 42.7 - 76.0 % Final     Lymphocyte %   Date Value Ref Range Status   07/23/2020 31.1 19.6 - 45.3 % Final     Neutrophils, Absolute   Date Value Ref Range Status   07/23/2020 3.90 1.70 - 7.00 10*3/mm3 Final     Lymphocytes, Absolute   Date Value Ref Range Status   07/23/2020 2.00 0.70 - 3.10 10*3/mm3 Final         OBJECTIVE:  Visit Vitals  /70 (BP Location: Left arm, Patient Position: Sitting, Cuff Size: Adult)   Pulse 75   Temp 97.5 °F (36.4 °C) (Oral)   Resp 15   Ht 160 cm (62.99\")   Wt 68.9 kg (152 lb)   SpO2 98%   BMI 26.93 kg/m²      Physical Exam  Vitals and nursing note reviewed.   Constitutional:       General: She is not in acute distress.     Appearance: Normal appearance. She is well-developed. She is not diaphoretic.      Comments: Very pleasant     HENT:      Head: Normocephalic and atraumatic.   Eyes:      Pupils: Pupils are " equal, round, and reactive to light.   Neck:      Thyroid: No thyromegaly.      Trachea: Phonation normal.   Cardiovascular:      Rate and Rhythm: Normal rate and regular rhythm.      Heart sounds: No murmur heard.  Pulmonary:      Effort: Pulmonary effort is normal. No respiratory distress.      Breath sounds: Normal breath sounds. No stridor. No wheezing, rhonchi or rales.   Skin:     Coloration: Skin is not pale.      Findings: No erythema.   Neurological:      Mental Status: She is alert.   Psychiatric:         Behavior: Behavior normal.         Thought Content: Thought content normal.         Judgment: Judgment normal.         Assessment/Plan    Diagnoses and all orders for this visit:    1. Type 2 diabetes mellitus with other circulatory complication, without long-term current use of insulin (HCC) (Primary)  -     Hemoglobin A1c; Future  -     Microalbumin / Creatinine Urine Ratio - Urine, Clean Catch; Future  -     Semaglutide,0.25 or 0.5MG/DOS, (Ozempic, 0.25 or 0.5 MG/DOSE,) 2 MG/1.5ML solution pen-injector; Inject 0.25 mg under the skin into the appropriate area as directed 1 (One) Time Per Week for 28 days, THEN 0.5 mg 1 (One) Time Per Week for 60 days.  Dispense: 1.5 mL; Refill: 5    2. Essential hypertension  -     amLODIPine (NORVASC) 10 MG tablet; Take 1 tablet by mouth Daily.  Dispense: 90 tablet; Refill: 3  -     Comprehensive Metabolic Panel; Future    3. Mixed hyperlipidemia  -     atorvastatin (LIPITOR) 40 MG tablet; Take 1 tablet by mouth Every Other Day.  Dispense: 45 tablet; Refill: 3  -     Comprehensive Metabolic Panel; Future  -     Lipid Panel; Future    4. Healthcare maintenance  -     Hemoglobin A1c; Future  -     CBC (No Diff); Future  -     Comprehensive Metabolic Panel; Future  -     Lipid Panel; Future    5. Need for vaccination  -     Pneumococcal Conjugate Vaccine 20-Valent All    Other orders  -     Fluzone High-Dose 65+yrs ([id number])      I believe she is having side effects to  metformin.  We will discontinue and start Ozempic.    Blood pressure is slightly elevated today and advised her to keep an eye on her blood pressure at home, notifying us if it is consistently high at home.    Obtain blood work today.  Further work-up or management based on those results.    She received both the influenza and Prevnar 20 vaccines today.  Tolerated these well without any immediate side effects.    Follow-up in 3 months for her Medicare wellness visit.  Follow-up sooner if indicated.    Return in about 3 months (around 12/19/2022) for Medicare Wellness.      TALIA Lovett MD  10:40 CDT  9/19/2022

## 2022-11-25 DIAGNOSIS — E11.59 TYPE 2 DIABETES MELLITUS WITH OTHER CIRCULATORY COMPLICATION, WITHOUT LONG-TERM CURRENT USE OF INSULIN: ICD-10-CM

## 2022-11-29 NOTE — TELEPHONE ENCOUNTER
PATIENT HAS BEEN CALLED, GIVEN MESSAGE AND UNDERSTANDING VOICED.  DHE STATED THAT SHE IS NOT TAKING IT.

## 2022-12-19 ENCOUNTER — TELEPHONE (OUTPATIENT)
Dept: INTERNAL MEDICINE | Facility: CLINIC | Age: 72
End: 2022-12-19

## 2022-12-19 DIAGNOSIS — E11.59 TYPE 2 DIABETES MELLITUS WITH OTHER CIRCULATORY COMPLICATION, WITHOUT LONG-TERM CURRENT USE OF INSULIN: Primary | ICD-10-CM

## 2022-12-19 DIAGNOSIS — E78.2 MIXED HYPERLIPIDEMIA: ICD-10-CM

## 2022-12-19 DIAGNOSIS — I10 ESSENTIAL HYPERTENSION: ICD-10-CM

## 2022-12-19 NOTE — TELEPHONE ENCOUNTER
Caller: Rosalba Churchill    Relationship: Self    Best call back number: [phone]    What orders are you requesting (i.e. lab or imaging): LABS    In what timeframe would the patient need to come in: AS SOON AS POSSIBLE    Where will you receive your lab/imaging services: OFFICE

## 2022-12-20 ENCOUNTER — LAB (OUTPATIENT)
Dept: LAB | Facility: HOSPITAL | Age: 72
End: 2022-12-20

## 2022-12-20 DIAGNOSIS — I10 ESSENTIAL HYPERTENSION: ICD-10-CM

## 2022-12-20 DIAGNOSIS — E78.2 MIXED HYPERLIPIDEMIA: ICD-10-CM

## 2022-12-20 DIAGNOSIS — E11.59 TYPE 2 DIABETES MELLITUS WITH OTHER CIRCULATORY COMPLICATION, WITHOUT LONG-TERM CURRENT USE OF INSULIN: ICD-10-CM

## 2022-12-20 LAB
ALBUMIN SERPL-MCNC: 4.2 G/DL (ref 3.5–5.2)
ALBUMIN/GLOB SERPL: 1.3 G/DL
ALP SERPL-CCNC: 105 U/L (ref 39–117)
ALT SERPL W P-5'-P-CCNC: 27 U/L (ref 1–33)
ANION GAP SERPL CALCULATED.3IONS-SCNC: 10 MMOL/L (ref 5–15)
AST SERPL-CCNC: 24 U/L (ref 1–32)
BILIRUB SERPL-MCNC: 0.3 MG/DL (ref 0–1.2)
BUN SERPL-MCNC: 14 MG/DL (ref 8–23)
BUN/CREAT SERPL: 25.9 (ref 7–25)
CALCIUM SPEC-SCNC: 9.4 MG/DL (ref 8.6–10.5)
CHLORIDE SERPL-SCNC: 99 MMOL/L (ref 98–107)
CHOLEST SERPL-MCNC: 161 MG/DL (ref 0–200)
CO2 SERPL-SCNC: 28 MMOL/L (ref 22–29)
CREAT SERPL-MCNC: 0.54 MG/DL (ref 0.57–1)
EGFRCR SERPLBLD CKD-EPI 2021: 98.6 ML/MIN/1.73
GLOBULIN UR ELPH-MCNC: 3.2 GM/DL
GLUCOSE SERPL-MCNC: 159 MG/DL (ref 65–99)
HBA1C MFR BLD: 7.7 % (ref 4.8–5.9)
HDLC SERPL-MCNC: 42 MG/DL (ref 40–60)
LDLC SERPL CALC-MCNC: 102 MG/DL (ref 0–100)
LDLC/HDLC SERPL: 2.4 {RATIO}
POTASSIUM SERPL-SCNC: 4.3 MMOL/L (ref 3.5–5.2)
PROT SERPL-MCNC: 7.4 G/DL (ref 6–8.5)
SODIUM SERPL-SCNC: 137 MMOL/L (ref 136–145)
TRIGL SERPL-MCNC: 92 MG/DL (ref 0–150)
VLDLC SERPL-MCNC: 17 MG/DL (ref 5–40)

## 2022-12-20 PROCEDURE — 80061 LIPID PANEL: CPT

## 2022-12-20 PROCEDURE — 36415 COLL VENOUS BLD VENIPUNCTURE: CPT

## 2022-12-20 PROCEDURE — 80053 COMPREHEN METABOLIC PANEL: CPT

## 2022-12-20 PROCEDURE — 83036 HEMOGLOBIN GLYCOSYLATED A1C: CPT

## 2022-12-22 ENCOUNTER — OFFICE VISIT (OUTPATIENT)
Dept: INTERNAL MEDICINE | Facility: CLINIC | Age: 72
End: 2022-12-22

## 2022-12-22 VITALS
SYSTOLIC BLOOD PRESSURE: 131 MMHG | HEART RATE: 85 BPM | WEIGHT: 151 LBS | RESPIRATION RATE: 15 BRPM | HEIGHT: 63 IN | OXYGEN SATURATION: 98 % | DIASTOLIC BLOOD PRESSURE: 70 MMHG | BODY MASS INDEX: 26.75 KG/M2

## 2022-12-22 DIAGNOSIS — R07.2 PRECORDIAL PAIN: ICD-10-CM

## 2022-12-22 DIAGNOSIS — E78.2 MIXED HYPERLIPIDEMIA: ICD-10-CM

## 2022-12-22 DIAGNOSIS — Z13.31 DEPRESSION SCREEN: ICD-10-CM

## 2022-12-22 DIAGNOSIS — I10 ESSENTIAL HYPERTENSION: ICD-10-CM

## 2022-12-22 DIAGNOSIS — R09.89 BRUIT OF RIGHT CAROTID ARTERY: ICD-10-CM

## 2022-12-22 DIAGNOSIS — Z12.31 ENCOUNTER FOR SCREENING MAMMOGRAM FOR MALIGNANT NEOPLASM OF BREAST: ICD-10-CM

## 2022-12-22 DIAGNOSIS — Z91.81 AT MODERATE RISK FOR FALL: ICD-10-CM

## 2022-12-22 DIAGNOSIS — E11.59 TYPE 2 DIABETES MELLITUS WITH OTHER CIRCULATORY COMPLICATION, WITHOUT LONG-TERM CURRENT USE OF INSULIN: ICD-10-CM

## 2022-12-22 DIAGNOSIS — Z86.73 HISTORY OF STROKE: ICD-10-CM

## 2022-12-22 DIAGNOSIS — R55 SYNCOPE, UNSPECIFIED SYNCOPE TYPE: ICD-10-CM

## 2022-12-22 DIAGNOSIS — Z00.00 MEDICARE ANNUAL WELLNESS VISIT, SUBSEQUENT: Primary | ICD-10-CM

## 2022-12-22 PROCEDURE — G0439 PPPS, SUBSEQ VISIT: HCPCS | Performed by: INTERNAL MEDICINE

## 2022-12-22 PROCEDURE — 1160F RVW MEDS BY RX/DR IN RCRD: CPT | Performed by: INTERNAL MEDICINE

## 2022-12-22 PROCEDURE — 1159F MED LIST DOCD IN RCRD: CPT | Performed by: INTERNAL MEDICINE

## 2022-12-22 PROCEDURE — 99214 OFFICE O/P EST MOD 30 MIN: CPT | Performed by: INTERNAL MEDICINE

## 2022-12-22 PROCEDURE — 93000 ELECTROCARDIOGRAM COMPLETE: CPT | Performed by: INTERNAL MEDICINE

## 2022-12-22 PROCEDURE — 1125F AMNT PAIN NOTED PAIN PRSNT: CPT | Performed by: INTERNAL MEDICINE

## 2022-12-22 PROCEDURE — 96160 PT-FOCUSED HLTH RISK ASSMT: CPT | Performed by: INTERNAL MEDICINE

## 2022-12-22 PROCEDURE — 1170F FXNL STATUS ASSESSED: CPT | Performed by: INTERNAL MEDICINE

## 2022-12-22 RX ORDER — LISINOPRIL 20 MG/1
20 TABLET ORAL DAILY
Start: 2022-12-22 | End: 2023-03-15

## 2022-12-22 RX ORDER — SEMAGLUTIDE 1.34 MG/ML
0.5 INJECTION, SOLUTION SUBCUTANEOUS WEEKLY
Qty: 1.5 ML | Refills: 5 | Status: SHIPPED | OUTPATIENT
Start: 2022-12-22 | End: 2023-04-03 | Stop reason: SDUPTHER

## 2022-12-22 RX ORDER — ATORVASTATIN CALCIUM 40 MG/1
40 TABLET, FILM COATED ORAL DAILY
Qty: 90 TABLET | Refills: 3 | Status: SHIPPED | OUTPATIENT
Start: 2022-12-22

## 2022-12-22 NOTE — PROGRESS NOTES
The ABCs of the Annual Wellness Visit  Subsequent Medicare Wellness Visit    Subjective      Rosalba Churchill is a 72 y.o. female who presents for a Subsequent Medicare Wellness Visit.    Also having other issues. See office note for details.     The following portions of the patient's history were reviewed and   updated as appropriate: allergies, current medications, past family history, past medical history, past social history, past surgical history and problem list.    Compared to one year ago, the patient feels her physical   health is the same.    Compared to one year ago, the patient feels her mental   health is the same.    Recent Hospitalizations:  She was not admitted to the hospital during the last year.       Current Medical Providers:  Patient Care Team:  LENI Lovett MD as PCP - General (Hospitalist)    Outpatient Medications Prior to Visit   Medication Sig Dispense Refill   • amLODIPine (NORVASC) 10 MG tablet Take 1 tablet by mouth Daily. 90 tablet 3   • aspirin 81 MG EC tablet Take 1 tablet by mouth Daily. 30 tablet 5   • cyclobenzaprine (FLEXERIL) 5 MG tablet Take 1 tablet by mouth 2 (Two) Times a Day As Needed for Muscle Spasms. 30 tablet 0   • sertraline (ZOLOFT) 100 MG tablet TAKE 1 TABLET BY MOUTH EVERY DAY 90 tablet 2   • atorvastatin (LIPITOR) 40 MG tablet Take 1 tablet by mouth Every Other Day. 45 tablet 3   • lisinopril (PRINIVIL,ZESTRIL) 20 MG tablet Take 1 tablet by mouth 2 (Two) Times a Day. (Patient taking differently: Take 20 mg by mouth Daily.) 180 tablet 1     No facility-administered medications prior to visit.       No opioid medication identified on active medication list. I have reviewed chart for other potential  high risk medication/s and harmful drug interactions in the elderly.          Aspirin is on active medication list. Aspirin use is indicated based on review of current medical condition/s. Pros and cons of this therapy have been discussed today. Benefits of this  "medication outweigh potential harm.  Patient has been encouraged to continue taking this medication.  .      Patient Active Problem List   Diagnosis   • Essential hypertension   • Hyperlipidemia   • Type 2 diabetes mellitus with circulatory disorder, without long-term current use of insulin (HCC)   • Stroke (HCC)   • History of poliomyelitis     Advance Care Planning  Advance Directive is not on file.  ACP discussion was held with the patient during this visit. Patient does not have an advance directive, declines further assistance.     Objective    Vitals:    12/22/22 1037   BP: 131/70   BP Location: Left arm   Patient Position: Sitting   Cuff Size: Adult   Pulse: 85   Resp: 15   SpO2: 98%   Weight: 68.5 kg (151 lb)   Height: 160 cm (62.99\")     Estimated body mass index is 26.76 kg/m² as calculated from the following:    Height as of this encounter: 160 cm (62.99\").    Weight as of this encounter: 68.5 kg (151 lb).    BMI is >= 25 and <30. (Overweight) The following options were offered after discussion;: exercise counseling/recommendations      Does the patient have evidence of cognitive impairment?   No    Lab Results   Component Value Date    TRIG 92 12/20/2022    HDL 42 12/20/2022     (H) 12/20/2022    VLDL 17 12/20/2022    HGBA1C 7.7 (H) 12/20/2022          HEALTH RISK ASSESSMENT    Smoking Status:  Social History     Tobacco Use   Smoking Status Never   Smokeless Tobacco Never     Alcohol Consumption:  Social History     Substance and Sexual Activity   Alcohol Use No     Fall Risk Screen:    STEADI Fall Risk Assessment was completed, and patient is at MODERATE risk for falls. Assessment completed on:12/22/2022    Depression Screening:  PHQ-2/PHQ-9 Depression Screening 12/22/2022   Retired PHQ-9 Total Score -   Retired Total Score -   Little Interest or Pleasure in Doing Things 0-->not at all   Feeling Down, Depressed or Hopeless 0-->not at all   PHQ-9: Brief Depression Severity Measure Score 0 "       Health Habits and Functional and Cognitive Screening:  Functional & Cognitive Status 12/22/2022   Do you have difficulty preparing food and eating? No   Do you have difficulty bathing yourself, getting dressed or grooming yourself? No   Do you have difficulty using the toilet? No   Do you have difficulty moving around from place to place? Yes   Do you have trouble with steps or getting out of a bed or a chair? No   Current Diet Well Balanced Diet   Dental Exam Up to date   Eye Exam Up to date   Exercise (times per week) 0 times per week   Current Exercises Include No Regular Exercise   Current Exercise Activities Include -   Do you need help using the phone?  -   Are you deaf or do you have serious difficulty hearing?  No   Do you need help with transportation? No   Do you need help shopping? No   Do you need help preparing meals?  No   Do you need help with housework?  No   Do you need help with laundry? No   Do you need help taking your medications? No   Do you need help managing money? No   Do you ever drive or ride in a car without wearing a seat belt? No   Have you felt unusual stress, anger or loneliness in the last month? No   Who do you live with? -   If you need help, do you have trouble finding someone available to you? No   Have you been bothered in the last four weeks by sexual problems? No   Do you have difficulty concentrating, remembering or making decisions? No       Age-appropriate Screening Schedule:  Refer to the list below for future screening recommendations based on patient's age, sex and/or medical conditions. Orders for these recommended tests are listed in the plan section. The patient has been provided with a written plan.    Health Maintenance   Topic Date Due   • TDAP/TD VACCINES (1 - Tdap) Never done   • ZOSTER VACCINE (1 of 2) Never done   • DIABETIC FOOT EXAM  07/09/2020   • DIABETIC EYE EXAM  10/12/2021   • MAMMOGRAM  12/22/2022   • HEMOGLOBIN A1C  06/20/2023   • URINE  MICROALBUMIN  09/19/2023   • LIPID PANEL  12/20/2023   • DXA SCAN  12/22/2023   • INFLUENZA VACCINE  Completed                CMS Preventative Services Quick Reference  Risk Factors Identified During Encounter:    Fall Risk-High or Moderate: Discussed Fall Prevention in the home  Immunizations Discussed/Encouraged: COVID19    The above risks/problems have been discussed with the patient.  Pertinent information has been shared with the patient in the After Visit Summary.    Diagnoses and all orders for this visit:    1. Medicare annual wellness visit, subsequent (Primary)    2. Depression screen    3. At moderate risk for fall    4. Type 2 diabetes mellitus with other circulatory complication, without long-term current use of insulin (HCC)  -     Semaglutide,0.25 or 0.5MG/DOS, (Ozempic, 0.25 or 0.5 MG/DOSE,) 2 MG/1.5ML solution pen-injector; Inject 0.5 mg under the skin into the appropriate area as directed 1 (One) Time Per Week.  Dispense: 1.5 mL; Refill: 5    5. History of stroke    6. Essential hypertension  -     lisinopril (PRINIVIL,ZESTRIL) 20 MG tablet; Take 1 tablet by mouth Daily.    7. Mixed hyperlipidemia  -     atorvastatin (LIPITOR) 40 MG tablet; Take 1 tablet by mouth Daily.  Dispense: 90 tablet; Refill: 3    8. Encounter for screening mammogram for malignant neoplasm of breast  -     Mammo Screening Digital Tomosynthesis Bilateral With CAD; Future    9. Precordial pain  -     ECG 12 Lead  -     Holter Monitor - 72 Hour Up To 15 Days; Future  -     Adult Transthoracic Echo Complete W/ Cont if Necessary Per Protocol; Future    10. Syncope, unspecified syncope type  -     Holter Monitor - 72 Hour Up To 15 Days; Future  -     Adult Transthoracic Echo Complete W/ Cont if Necessary Per Protocol; Future    11. Bruit of right carotid artery  -     US Carotid Bilateral; Future       Rosalba had influenza vaccine this season. Declines covid booster today.    She reports a negative cologuard last year but we  don't have results. I have requested records from [facility].     See office note regarding other issues.     Follow Up:   Next Medicare Wellness visit to be scheduled in 1 year.      An After Visit Summary and PPPS were made available to the patient.

## 2022-12-22 NOTE — PROGRESS NOTES
Chief Complaint   Patient presents with   • Medicare Wellness-subsequent         History:  Rosalba Churchill is a 72 y.o. female who presents today for evaluation of the above problems.      See Medicare wellness note for details regarding that visit.  She is having other issues.    She has been having chest pain that radiates to left arm.  This is a little concerning to her.  She takes aspirin which helps the pain.  When it does occur it usually occurs at night and lasts all night.  Does not typically occur during exertion.  She is hurting some at this moment.  She describes it as a pressure and tightness without associated diaphoresis or nausea.  She does not have any reflux symptoms.    She has been using her Lipitor 20 mg every day rather than 40 mg every other day.  Goal LDL is less than 70 and hers was 100 on recent labs.  See below for details.    She stopped metformin and consistently her stomach pain and diarrhea resolved.  She is on Ozempic 0.5 mg weekly doing very well with this.  About a week after she started the medication she had some very intense nausea.  She started to feel lightheaded and as if she was going to pass out.  Ultimately, she had syncopal episode and has not had any since then.  She also reports having palpitations and heart racing which resolved when she coughs.  Those happen once a month or once every 3 weeks.  HPI      ROS:  Review of Systems   Constitutional: Positive for activity change and fatigue.   Respiratory: Negative for shortness of breath.    Cardiovascular: Positive for chest pain and palpitations.   Gastrointestinal: Negative.         Resolved after stopping metformin     Genitourinary: Negative.    Musculoskeletal: Positive for arthralgias and gait problem.   Neurological: Positive for syncope.   Psychiatric/Behavioral: Negative.          Current Outpatient Medications:   •  amLODIPine (NORVASC) 10 MG tablet, Take 1 tablet by mouth Daily., Disp: 90 tablet, Rfl: 3  •   aspirin 81 MG EC tablet, Take 1 tablet by mouth Daily., Disp: 30 tablet, Rfl: 5  •  atorvastatin (LIPITOR) 40 MG tablet, Take 1 tablet by mouth Daily., Disp: 90 tablet, Rfl: 3  •  cyclobenzaprine (FLEXERIL) 5 MG tablet, Take 1 tablet by mouth 2 (Two) Times a Day As Needed for Muscle Spasms., Disp: 30 tablet, Rfl: 0  •  lisinopril (PRINIVIL,ZESTRIL) 20 MG tablet, Take 1 tablet by mouth Daily., Disp: , Rfl:   •  Semaglutide,0.25 or 0.5MG/DOS, (Ozempic, 0.25 or 0.5 MG/DOSE,) 2 MG/1.5ML solution pen-injector, Inject 0.5 mg under the skin into the appropriate area as directed 1 (One) Time Per Week., Disp: 1.5 mL, Rfl: 5  •  sertraline (ZOLOFT) 100 MG tablet, TAKE 1 TABLET BY MOUTH EVERY DAY, Disp: 90 tablet, Rfl: 2    Lab Results   Component Value Date    GLUCOSE 159 (H) 12/20/2022    BUN 14 12/20/2022    CREATININE 0.54 (L) 12/20/2022    EGFRIFNONA 105 09/22/2021    BCR 25.9 (H) 12/20/2022    K 4.3 12/20/2022    CO2 28.0 12/20/2022    CALCIUM 9.4 12/20/2022    ALBUMIN 4.20 12/20/2022    AST 24 12/20/2022    ALT 27 12/20/2022       WBC   Date Value Ref Range Status   09/19/2022 5.29 3.40 - 10.80 10*3/mm3 Final     RBC   Date Value Ref Range Status   09/19/2022 4.76 3.77 - 5.28 10*6/mm3 Final     Hemoglobin   Date Value Ref Range Status   09/19/2022 13.0 12.0 - 15.9 g/dL Final     Hematocrit   Date Value Ref Range Status   09/19/2022 41.3 34.0 - 46.6 % Final     MCV   Date Value Ref Range Status   09/19/2022 86.8 79.0 - 97.0 fL Final     MCH   Date Value Ref Range Status   09/19/2022 27.3 26.6 - 33.0 pg Final     MCHC   Date Value Ref Range Status   09/19/2022 31.5 31.5 - 35.7 g/dL Final     RDW   Date Value Ref Range Status   09/19/2022 11.9 (L) 12.3 - 15.4 % Final     RDW-SD   Date Value Ref Range Status   09/19/2022 38.5 37.0 - 54.0 fl Final     MPV   Date Value Ref Range Status   09/19/2022 10.2 6.0 - 12.0 fL Final     Platelets   Date Value Ref Range Status   09/19/2022 246 140 - 450 10*3/mm3 Final     Neutrophil %  "  Date Value Ref Range Status   07/23/2020 59.2 42.7 - 76.0 % Final     Lymphocyte %   Date Value Ref Range Status   07/23/2020 31.1 19.6 - 45.3 % Final     Neutrophils, Absolute   Date Value Ref Range Status   07/23/2020 3.90 1.70 - 7.00 10*3/mm3 Final     Lymphocytes, Absolute   Date Value Ref Range Status   07/23/2020 2.00 0.70 - 3.10 10*3/mm3 Final         OBJECTIVE:  Visit Vitals  /70 (BP Location: Left arm, Patient Position: Sitting, Cuff Size: Adult)   Pulse 85   Resp 15   Ht 160 cm (62.99\")   Wt 68.5 kg (151 lb)   SpO2 98%   BMI 26.76 kg/m²        Physical Exam  Vitals and nursing note reviewed.   Constitutional:       General: She is not in acute distress.     Appearance: Normal appearance. She is well-developed. She is not diaphoretic.      Comments: Pleasant     HENT:      Head: Normocephalic and atraumatic.      Right Ear: Tympanic membrane, ear canal and external ear normal. There is no impacted cerumen.      Left Ear: Tympanic membrane, ear canal and external ear normal. There is no impacted cerumen.   Eyes:      Pupils: Pupils are equal, round, and reactive to light.   Neck:      Thyroid: No thyromegaly.      Vascular: Carotid bruit (right) present.      Trachea: Phonation normal.   Pulmonary:      Effort: No respiratory distress.   Abdominal:      Tenderness: There is no abdominal tenderness.   Musculoskeletal:      Right lower leg: No edema.      Left lower leg: No edema.   Skin:     Coloration: Skin is not pale.      Findings: No erythema.   Neurological:      Mental Status: She is alert. Mental status is at baseline.   Psychiatric:         Behavior: Behavior normal.         Thought Content: Thought content normal.         Judgment: Judgment normal.         ECG 12 Lead    Date/Time: 12/22/2022 12:41 PM  Performed by: LENI Lovett MD  Authorized by: LENI Lovett MD   Comparison: compared with previous ECG from 9/19/2019  Similar to previous ECG  Rhythm: sinus rhythm  Ectopy: atrial " premature contractions  Rate: normal  BPM: 85  ST Segments: ST segments normal  T Waves: T waves normal  QRS axis: normal  Other: no other findings  Comments: Borderline ekg             Assessment/Plan    Diagnoses and all orders for this visit:    1. Medicare annual wellness visit, subsequent (Primary)    2. Depression screen    3. At moderate risk for fall    4. Type 2 diabetes mellitus with other circulatory complication, without long-term current use of insulin (HCC)  -     Semaglutide,0.25 or 0.5MG/DOS, (Ozempic, 0.25 or 0.5 MG/DOSE,) 2 MG/1.5ML solution pen-injector; Inject 0.5 mg under the skin into the appropriate area as directed 1 (One) Time Per Week.  Dispense: 1.5 mL; Refill: 5  -     Hemoglobin A1c; Future    5. History of stroke    6. Essential hypertension  -     lisinopril (PRINIVIL,ZESTRIL) 20 MG tablet; Take 1 tablet by mouth Daily.    7. Mixed hyperlipidemia  -     atorvastatin (LIPITOR) 40 MG tablet; Take 1 tablet by mouth Daily.  Dispense: 90 tablet; Refill: 3  -     Lipid Panel; Future    8. Encounter for screening mammogram for malignant neoplasm of breast  -     Mammo Screening Digital Tomosynthesis Bilateral With CAD; Future    9. Precordial pain  -     ECG 12 Lead  -     Holter Monitor - 72 Hour Up To 15 Days; Future  -     Adult Transthoracic Echo Complete W/ Cont if Necessary Per Protocol; Future    10. Syncope, unspecified syncope type  -     Holter Monitor - 72 Hour Up To 15 Days; Future  -     Adult Transthoracic Echo Complete W/ Cont if Necessary Per Protocol; Future    11. Bruit of right carotid artery  -     US Carotid Bilateral; Future      Rosalba needs cardiac work-up.  Concerned with her symptoms of chest pain/pressure, palpitations, and syncope.  Going to get a stress echocardiogram, 2D echocardiogram and a 14-day Zio patch.  EKG today was unremarkable.  I would also like to get ultrasound of the carotid arteries given a right-sided carotid bruit.    I would like her to start  taking Lipitor 40 mg daily instead of 20 mg daily since her LDL is not quite at goal.    Continue 0.5 mg Ozempic weekly.  Could increase if we started having issues with supply.    We will check A1c and lipid panel in 6 months    Return in about 6 months (around 6/22/2023) for Recheck .      TALIA Lovett MD  10:50 CST  12/22/2022

## 2023-01-10 DIAGNOSIS — Z80.0 FAMILY HISTORY OF COLON CANCER: ICD-10-CM

## 2023-01-10 DIAGNOSIS — Z80.3 FAMILY HISTORY OF BREAST CANCER: Primary | ICD-10-CM

## 2023-01-11 ENCOUNTER — HOSPITAL ENCOUNTER (OUTPATIENT)
Dept: MAMMOGRAPHY | Facility: HOSPITAL | Age: 73
Discharge: HOME | End: 2023-01-11
Admitting: INTERNAL MEDICINE
Payer: MEDICARE

## 2023-01-11 ENCOUNTER — LAB (OUTPATIENT)
Dept: LAB | Facility: HOSPITAL | Age: 73
End: 2023-01-11

## 2023-01-11 DIAGNOSIS — Z80.0 FAMILY HISTORY OF COLON CANCER: ICD-10-CM

## 2023-01-11 DIAGNOSIS — Z12.31 ENCOUNTER FOR SCREENING MAMMOGRAM FOR MALIGNANT NEOPLASM OF BREAST: ICD-10-CM

## 2023-01-11 DIAGNOSIS — Z80.3 FAMILY HISTORY OF BREAST CANCER: ICD-10-CM

## 2023-01-11 PROCEDURE — 77063 BREAST TOMOSYNTHESIS BI: CPT

## 2023-01-11 PROCEDURE — 77067 SCR MAMMO BI INCL CAD: CPT

## 2023-01-25 ENCOUNTER — HOSPITAL ENCOUNTER (OUTPATIENT)
Dept: CARDIOLOGY | Facility: HOSPITAL | Age: 73
Discharge: HOME | End: 2023-01-25
Payer: MEDICARE

## 2023-01-25 ENCOUNTER — HOSPITAL ENCOUNTER (OUTPATIENT)
Dept: ULTRASOUND IMAGING | Facility: HOSPITAL | Age: 73
Discharge: HOME | End: 2023-01-25
Payer: MEDICARE

## 2023-01-25 DIAGNOSIS — R09.89 BRUIT OF RIGHT CAROTID ARTERY: ICD-10-CM

## 2023-01-25 DIAGNOSIS — R07.2 PRECORDIAL PAIN: ICD-10-CM

## 2023-01-25 DIAGNOSIS — R55 SYNCOPE, UNSPECIFIED SYNCOPE TYPE: ICD-10-CM

## 2023-01-25 PROCEDURE — 93246 EXT ECG>7D<15D RECORDING: CPT

## 2023-01-25 PROCEDURE — 93880 EXTRACRANIAL BILAT STUDY: CPT | Performed by: SURGERY

## 2023-01-25 PROCEDURE — 93880 EXTRACRANIAL BILAT STUDY: CPT

## 2023-01-25 PROCEDURE — 93306 TTE W/DOPPLER COMPLETE: CPT | Performed by: EMERGENCY MEDICINE

## 2023-01-25 PROCEDURE — 93306 TTE W/DOPPLER COMPLETE: CPT

## 2023-01-26 DIAGNOSIS — I65.22 STENOSIS OF LEFT EXTERNAL CAROTID ARTERY: Primary | ICD-10-CM

## 2023-01-26 LAB
BH CV ECHO MEAS - AO MAX PG: 9.4 MMHG
BH CV ECHO MEAS - AO MEAN PG: 4.7 MMHG
BH CV ECHO MEAS - AO ROOT DIAM: 2.8 CM
BH CV ECHO MEAS - AO V2 MAX: 153 CM/SEC
BH CV ECHO MEAS - AO V2 VTI: 28.1 CM
BH CV ECHO MEAS - AVA(I,D): 2.9 CM2
BH CV ECHO MEAS - EDV(CUBED): 46.7 ML
BH CV ECHO MEAS - EDV(MOD-SP2): 57.4 ML
BH CV ECHO MEAS - EDV(MOD-SP4): 90.1 ML
BH CV ECHO MEAS - EF(MOD-BP): 63.3 %
BH CV ECHO MEAS - EF(MOD-SP2): 62.7 %
BH CV ECHO MEAS - EF(MOD-SP4): 66.9 %
BH CV ECHO MEAS - ESV(CUBED): 17.6 ML
BH CV ECHO MEAS - ESV(MOD-SP2): 21.4 ML
BH CV ECHO MEAS - ESV(MOD-SP4): 29.8 ML
BH CV ECHO MEAS - FS: 27.8 %
BH CV ECHO MEAS - IVS/LVPW: 1.1 CM
BH CV ECHO MEAS - IVSD: 1.1 CM
BH CV ECHO MEAS - LA DIMENSION: 3.1 CM
BH CV ECHO MEAS - LAT PEAK E' VEL: 6.9 CM/SEC
BH CV ECHO MEAS - LV DIASTOLIC VOL/BSA (35-75): 52.5 CM2
BH CV ECHO MEAS - LV MASS(C)D: 115.9 GRAMS
BH CV ECHO MEAS - LV MAX PG: 4.8 MMHG
BH CV ECHO MEAS - LV MEAN PG: 2 MMHG
BH CV ECHO MEAS - LV SYSTOLIC VOL/BSA (12-30): 17.4 CM2
BH CV ECHO MEAS - LV V1 MAX: 109.5 CM/SEC
BH CV ECHO MEAS - LV V1 VTI: 21.2 CM
BH CV ECHO MEAS - LVIDD: 3.6 CM
BH CV ECHO MEAS - LVIDS: 2.6 CM
BH CV ECHO MEAS - LVOT AREA: 3.8 CM2
BH CV ECHO MEAS - LVOT DIAM: 2.2 CM
BH CV ECHO MEAS - LVPWD: 1 CM
BH CV ECHO MEAS - MED PEAK E' VEL: 5 CM/SEC
BH CV ECHO MEAS - MV A MAX VEL: 97.5 CM/SEC
BH CV ECHO MEAS - MV DEC SLOPE: 199 CM/SEC2
BH CV ECHO MEAS - MV DEC TIME: 0.25 MSEC
BH CV ECHO MEAS - MV E MAX VEL: 53.8 CM/SEC
BH CV ECHO MEAS - MV E/A: 0.55
BH CV ECHO MEAS - MV MAX PG: 4.9 MMHG
BH CV ECHO MEAS - MV MEAN PG: 2 MMHG
BH CV ECHO MEAS - MV P1/2T: 99.9 MSEC
BH CV ECHO MEAS - MV V2 VTI: 24.8 CM
BH CV ECHO MEAS - MVA(P1/2T): 2.2 CM2
BH CV ECHO MEAS - MVA(VTI): 3.2 CM2
BH CV ECHO MEAS - PA V2 MAX: 135 CM/SEC
BH CV ECHO MEAS - RAP SYSTOLE: 5 MMHG
BH CV ECHO MEAS - RVSP: 25.6 MMHG
BH CV ECHO MEAS - SI(MOD-SP2): 21 ML/M2
BH CV ECHO MEAS - SI(MOD-SP4): 35.1 ML/M2
BH CV ECHO MEAS - SV(LVOT): 80.6 ML
BH CV ECHO MEAS - SV(MOD-SP2): 36 ML
BH CV ECHO MEAS - SV(MOD-SP4): 60.3 ML
BH CV ECHO MEAS - TAPSE (>1.6): 2.44 CM
BH CV ECHO MEAS - TR MAX PG: 20.6 MMHG
BH CV ECHO MEAS - TR MAX VEL: 227 CM/SEC
BH CV ECHO MEASUREMENTS AVERAGE E/E' RATIO: 9.04
BH CV VAS BP LEFT ARM: NORMAL MMHG
BH CV XLRA - TDI S': 13.2 CM/SEC
LEFT ATRIUM VOLUME: 14.6 ML
MAXIMAL PREDICTED HEART RATE: 149 BPM
STRESS TARGET HR: 127 BPM

## 2023-02-02 ENCOUNTER — HOSPITAL ENCOUNTER (OUTPATIENT)
Dept: CT IMAGING | Facility: HOSPITAL | Age: 73
Discharge: HOME | End: 2023-02-02
Admitting: INTERNAL MEDICINE
Payer: MEDICARE

## 2023-02-02 DIAGNOSIS — I65.22 STENOSIS OF LEFT EXTERNAL CAROTID ARTERY: ICD-10-CM

## 2023-02-02 LAB — CREAT BLDA-MCNC: 0.5 MG/DL (ref 0.6–1.3)

## 2023-02-02 PROCEDURE — 0 IOPAMIDOL PER 1 ML: Performed by: INTERNAL MEDICINE

## 2023-02-02 PROCEDURE — 82565 ASSAY OF CREATININE: CPT

## 2023-02-02 PROCEDURE — 70498 CT ANGIOGRAPHY NECK: CPT

## 2023-02-02 RX ADMIN — IOPAMIDOL 71 ML: 755 INJECTION, SOLUTION INTRAVENOUS at 13:41

## 2023-02-03 ENCOUNTER — TELEPHONE (OUTPATIENT)
Dept: INTERNAL MEDICINE | Facility: CLINIC | Age: 73
End: 2023-02-03

## 2023-02-08 DIAGNOSIS — I65.01 STENOSIS OF RIGHT VERTEBRAL ARTERY: Primary | ICD-10-CM

## 2023-02-15 ENCOUNTER — TELEPHONE (OUTPATIENT)
Dept: VASCULAR SURGERY | Facility: CLINIC | Age: 73
End: 2023-02-15
Payer: MEDICARE

## 2023-02-16 ENCOUNTER — OFFICE VISIT (OUTPATIENT)
Dept: VASCULAR SURGERY | Facility: CLINIC | Age: 73
End: 2023-02-16
Payer: MEDICARE

## 2023-02-16 VITALS
BODY MASS INDEX: 25.16 KG/M2 | SYSTOLIC BLOOD PRESSURE: 146 MMHG | OXYGEN SATURATION: 97 % | WEIGHT: 142 LBS | HEART RATE: 90 BPM | DIASTOLIC BLOOD PRESSURE: 62 MMHG | HEIGHT: 63 IN

## 2023-02-16 DIAGNOSIS — E78.2 MIXED HYPERLIPIDEMIA: ICD-10-CM

## 2023-02-16 DIAGNOSIS — E11.59 TYPE 2 DIABETES MELLITUS WITH OTHER CIRCULATORY COMPLICATION, WITHOUT LONG-TERM CURRENT USE OF INSULIN: ICD-10-CM

## 2023-02-16 DIAGNOSIS — I10 ESSENTIAL HYPERTENSION: ICD-10-CM

## 2023-02-16 DIAGNOSIS — I65.23 BILATERAL CAROTID ARTERY STENOSIS: Primary | ICD-10-CM

## 2023-02-16 PROCEDURE — 99204 OFFICE O/P NEW MOD 45 MIN: CPT | Performed by: SURGERY

## 2023-02-16 NOTE — PROGRESS NOTES
2023      LENI Lovett MD  [address]  [place] [address]    Rosalba Churchill  1950    Chief Complaint   Patient presents with   • NEW PATIENT     Stenosis of the right vertebral artery.  She had a CTA of her carotids on 23.  Pt states that the patient had carotid bruit.  Referred by Dr. Robert Lovett.       Dear LENI Lovett MD    HPI  I had the pleasure of seeing your patient Rosalba Churchill in the office today.  Thank you kindly for this consultation.  As you recall, Rosalba Churchill is a 72 y.o.  female who you are currently following for routine health maintenance.  She had a previous brainstem stroke.  At that time, she was unsteady and could not speak well.  She denied any unilateral weakness at that time.  She has no residual symptoms, other than more tired. She is maintained on aspirin and Lipitor.  She did have noninvasive testing performed, which I did personally review.       Past Medical History:   Diagnosis Date   • Anxiety    • Arthritis    • Depression    • Heart murmur    • Hyperlipidemia    • Hypertension    • Kidney stone    • Polio    • Stroke (HCC)    • Type 2 diabetes mellitus (HCC)        Past Surgical History:   Procedure Laterality Date   • ANKLE FUSION Right    • CARDIAC CATHETERIZATION     • CERVICAL SPINE SURGERY     •  SECTION         Family History   Problem Relation Age of Onset   • Cancer Mother    • Breast cancer Mother    • Heart disease Father    • Diabetes Sister    • Cancer Sister         Heart attack   • Diabetes Maternal Uncle    • No Known Problems Brother    • No Known Problems Daughter    • No Known Problems Son    • No Known Problems Maternal Grandmother    • No Known Problems Paternal Grandmother    • No Known Problems Maternal Aunt    • No Known Problems Paternal Aunt    • No Known Problems Other    • BRCA 1/2 Neg Hx    • Colon cancer Neg Hx    • Endometrial cancer Neg Hx    • Ovarian cancer Neg Hx   "      Social History     Socioeconomic History   • Marital status: Single   Tobacco Use   • Smoking status: Never   • Smokeless tobacco: Never   Substance and Sexual Activity   • Alcohol use: No   • Drug use: No   • Sexual activity: Not Currently     Partners: Male       Allergies   Allergen Reactions   • Erythromycin Unknown (See Comments)   • Ibuprofen Unknown (See Comments)   • Moxifloxacin Unknown (See Comments)         Current Outpatient Medications:   •  amLODIPine (NORVASC) 10 MG tablet, Take 1 tablet by mouth Daily., Disp: 90 tablet, Rfl: 3  •  aspirin 81 MG EC tablet, Take 1 tablet by mouth Daily., Disp: 30 tablet, Rfl: 5  •  atorvastatin (LIPITOR) 40 MG tablet, Take 1 tablet by mouth Daily., Disp: 90 tablet, Rfl: 3  •  cyclobenzaprine (FLEXERIL) 5 MG tablet, Take 1 tablet by mouth 2 (Two) Times a Day As Needed for Muscle Spasms., Disp: 30 tablet, Rfl: 0  •  lisinopril (PRINIVIL,ZESTRIL) 20 MG tablet, Take 1 tablet by mouth Daily., Disp: , Rfl:   •  Semaglutide,0.25 or 0.5MG/DOS, (Ozempic, 0.25 or 0.5 MG/DOSE,) 2 MG/1.5ML solution pen-injector, Inject 0.5 mg under the skin into the appropriate area as directed 1 (One) Time Per Week., Disp: 1.5 mL, Rfl: 5  •  sertraline (ZOLOFT) 100 MG tablet, TAKE 1 TABLET BY MOUTH EVERY DAY, Disp: 90 tablet, Rfl: 2    Review of Systems   Constitutional: Negative.    HENT: Negative.    Eyes: Negative.    Respiratory: Negative.    Cardiovascular: Negative.    Gastrointestinal: Negative.    Endocrine: Negative.    Genitourinary: Negative.    Musculoskeletal: Negative.    Skin: Negative.    Allergic/Immunologic: Negative.    Neurological: Negative.    Hematological: Negative.    Psychiatric/Behavioral: Negative.    All other systems reviewed and are negative.    /62   Pulse 90   Ht 160 cm (63\")   Wt 64.4 kg (142 lb)   SpO2 97%   BMI 25.15 kg/m²     Physical Exam  Vitals and nursing note reviewed.   Constitutional:       Appearance: She is well-developed.   HENT:     "  Head: Normocephalic and atraumatic.   Eyes:      General: No scleral icterus.     Pupils: Pupils are equal, round, and reactive to light.   Neck:      Thyroid: No thyromegaly.      Vascular: No carotid bruit or JVD.   Cardiovascular:      Rate and Rhythm: Normal rate and regular rhythm.      Pulses:           Carotid pulses are 2+ on the right side and 2+ on the left side.       Femoral pulses are 2+ on the right side and 2+ on the left side.       Popliteal pulses are 2+ on the right side and 2+ on the left side.        Dorsalis pedis pulses are 2+ on the right side and 2+ on the left side.        Posterior tibial pulses are 2+ on the right side and 2+ on the left side.      Heart sounds: Normal heart sounds.   Pulmonary:      Effort: Pulmonary effort is normal.      Breath sounds: Normal breath sounds.   Abdominal:      General: Bowel sounds are normal. There is no distension or abdominal bruit.      Palpations: Abdomen is soft. There is no mass.      Tenderness: There is no abdominal tenderness.   Musculoskeletal:         General: Normal range of motion.      Cervical back: Neck supple.   Lymphadenopathy:      Cervical: No cervical adenopathy.   Skin:     General: Skin is warm and dry.   Neurological:      Mental Status: She is alert and oriented to person, place, and time.      Cranial Nerves: No cranial nerve deficit.      Sensory: No sensory deficit.         Diagnostic Data:  Adult Transthoracic Echo Complete W/ Cont if Necessary Per Protocol    Result Date: 1/26/2023  Narrative: •  Left ventricular systolic function is normal. Left ventricular ejection fraction appears to be 61 - 65%. •  Left ventricular diastolic dysfunction is noted. •  Normal right ventricular cavity size and systolic function noted. •  No hemodynamically significant valvular disease by Doppler exam. •  No evidence of pulmonary hypertension is present.     US Carotid Bilateral    Result Date: 1/25/2023  Narrative: History: Carotid  occlusive disease      Impression: Impression: 1. There is less than 50% stenosis of the right internal carotid artery. 2. There is less than 50% stenosis of the left internal carotid artery. 3. Antegrade flow is demonstrated in bilateral vertebral arteries.  Comments: Bilateral carotid vertebral arterial duplex scan was performed.  Grayscale imaging shows intimal thickening and calcified elements at the carotid bifurcation. The right internal carotid artery peak systolic velocity is 105.3 cm/sec. The end-diastolic velocity is 27.7 cm/sec. The right ICA/CCA ratio is approximately 1.2 . These findings correlate with less than 50% stenosis of the right internal carotid artery.  Grayscale imaging shows intimal thickening and calcified elements at the carotid bifurcation. The left internal carotid artery peak systolic velocity is 106 cm/sec. The end-diastolic velocity is 27 cm/sec. The left ICA/CCA ratio is approximately 1.0 . These findings correlate with less than 50% stenosis of the left internal carotid artery.  Antegrade flow is demonstrated in bilateral vertebral arteries. There is greater than 50% stenosis of the left external carotid artery. This report was finalized on 01/25/2023 16:27 by Dr. Dayton Ha MD.    CT Angiogram Carotids    Result Date: 2/2/2023  Narrative: EXAMINATION:  CT ANGIOGRAM CAROTIDS-  2/2/2023 1:29 PM CST  HISTORY: Carotid artery stenosis.  COMPARISON : No comparison study.  DLP: 270 mGy-cm. Automated dosage reduction technique was utilized to reduce patient dosage.  TECHNIQUE: CT angio was performed of the neck with IV contrast. Coronal, sagittal and 3-D reconstruction were performed.  INDEPENDENT 3-D WORKSTATION UTILIZED FOR RECONSTRUCTION: Yes. A radiologist was present in the department.  VERTEBRAL ARTERIES: The right vertebral artery origin is somewhat difficult to fully evaluate. There may be 50% or greater stenosis at its origin. This could be artifactual. There is minimal  plaque in both vertebral arteries at the skull base that does not limit flow. The left vertebral artery is dominant. The left vertebral artery origin demonstrates no stenosis. There is mild calcified plaque at its origin.  RIGHT CAROTID ARTERY: There is calcified plaque in the right carotid bifurcation and extending into the origin of the right internal carotid artery. There is about 40% diameter stenosis of the proximal right internal carotid artery.  LEFT CAROTID ARTERY: There is minimal plaque in the left carotid bifurcation that does not cause any flow limiting stenosis. There is minimal narrowing at the origin of the left external carotid artery.  OTHER FINDINGS: There are postoperative and degenerative changes of the visualized spine. There is atheromatous calcification of the cavernous carotid arteries intracranially with no definite flow-limiting stenosis. The visualized brain demonstrates no focal abnormality. The globes, optic nerves and ocular muscles are symmetric. The parotid and submandibular glands are symmetric. The thyroid gland is unremarkable. There is no airway mass or asymmetry. There are small lymph nodes in the neck that are not pathologically enlarged. The visualized lung apices demonstrate no significant abnormality.      Impression: 1. NASCET criteria utilized. 2. The right vertebral artery origin is difficult to evaluate. There could be significant stenosis at its origin versus artifact. The left vertebral artery is dominant. There is no flow-limiting left vertebral artery plaque. There is mild plaque within both vertebral arteries at the skull base that does not cause significant stenosis. 3. There is about 40% diameter narrowing of the proximal right internal carotid artery due to calcified plaque. There is mild calcified plaque in the left carotid bifurcation with no flow-limiting stenosis. 4. Calcification of the cavernous carotid arteries intracranially laterally with no definite  flow-limiting stenosis. This report was finalized on 02/02/2023 15:07 by Dr. Dariel Comer MD.       Patient Active Problem List   Diagnosis   • Essential hypertension   • Hyperlipidemia   • Type 2 diabetes mellitus with circulatory disorder, without long-term current use of insulin (HCC)   • Stroke (HCC)   • History of poliomyelitis        Diagnosis Plan   1. Bilateral carotid artery stenosis  US Carotid Bilateral      2. Essential hypertension        3. Mixed hyperlipidemia        4. Type 2 diabetes mellitus with other circulatory complication, without long-term current use of insulin (HCC)            Plan: After thoroughly evaluating Rosalba Churchill, I believe the best course of action is to remain conservative from vascular surgery standpoint.  I did closely review her CTA of carotids and there is some right vertebral stenosis however no intervention needed.  Her carotid arteries are patent without significant stenosis.  We will see her back in 1 year with repeat noninvasive testing for continued surveillance, including a carotid duplex.  I did discuss vascular risk factors as they pertain to the progression of vascular disease including controlling her hypertension, hyperlipidemia, and diabetes.  Her hypertension and hyperlipidemia are currently stable.  Her diabetes is not proving however most recent hemoglobin A1c still above goal at 7.7%.  The patient is to continue taking their medications as previously discussed.   This was all discussed in full with complete understanding.  Thank you for allowing me to participate in the care of your patient.  Please do not hesitate to call with any questions or concerns.  We will keep you aware of any further encounters with Rosalba Churchill.        Sincerely yours,         DO Rachell Woods D Ryan, MD

## 2023-02-17 ENCOUNTER — PATIENT ROUNDING (BHMG ONLY) (OUTPATIENT)
Dept: VASCULAR SURGERY | Facility: CLINIC | Age: 73
End: 2023-02-17
Payer: MEDICARE

## 2023-02-17 NOTE — PROGRESS NOTES
February 17, 2023    Hello, may I speak with Rosalba Churchill?    My name is GM       I am  with [facility]  [address], [address]  [facility] [address] [phone].    Before we get started may I verify your date of birth? 1950    I am calling to officially welcome you to our practice and ask about your recent visit. Is this a good time to talk? yes    Tell me about your visit with us. What things went well?  EVERYTHING WENT WELL       We're always looking for ways to make our patients' experiences even better. Do you have recommendations on ways we may improve?  NO, EVERYTHING WENT REALLY GOOD. AND I WAS HAPPY WITH MY RESULTS.     Overall were you satisfied with your first visit to our practice? yes       I appreciate you taking the time to speak with me today. Is there anything else I can do for you? no      Thank you, and have a great day.

## 2023-02-20 LAB
MAXIMAL PREDICTED HEART RATE: 149 BPM
STRESS TARGET HR: 127 BPM

## 2023-02-20 PROCEDURE — 93248 EXT ECG>7D<15D REV&INTERPJ: CPT | Performed by: INTERNAL MEDICINE

## 2023-02-23 ENCOUNTER — TELEPHONE (OUTPATIENT)
Dept: INTERNAL MEDICINE | Facility: CLINIC | Age: 73
End: 2023-02-23

## 2023-02-23 NOTE — TELEPHONE ENCOUNTER
Caller: Rosalba Churchill    Relationship to patient: Self    Best call back number:     [phone] (Mobile)     Patient is needing: pt said that she is needing a new order to be sent for a knee brace     Send to: [facility]: Prosthetics & Orthotics     She said that her current brace is too heavy and keeps breaking; they advised her at the [facility] to have  place on the order that her current  brace is non repairable due to weight loss

## 2023-02-24 DIAGNOSIS — Z86.12 HISTORY OF POLIOMYELITIS: ICD-10-CM

## 2023-02-24 DIAGNOSIS — Z86.73 HISTORY OF STROKE: Primary | ICD-10-CM

## 2023-02-24 NOTE — TELEPHONE ENCOUNTER
I have ordered the a knee brace for her.  However, there is a possibility that the insurance is going to require a face to face office visit.  We will let her know if we need this.

## 2023-03-15 DIAGNOSIS — I10 ESSENTIAL HYPERTENSION: ICD-10-CM

## 2023-03-15 RX ORDER — LISINOPRIL 20 MG/1
TABLET ORAL
Qty: 180 TABLET | Refills: 1 | Status: SHIPPED | OUTPATIENT
Start: 2023-03-15

## 2023-03-15 NOTE — TELEPHONE ENCOUNTER
Rx Refill Note  Requested Prescriptions     Pending Prescriptions Disp Refills   • lisinopril (PRINIVIL,ZESTRIL) 20 MG tablet [Pharmacy Med Name: LISINOPRIL 20 MG TABLET] 180 tablet 1     Sig: TAKE 1 TABLET BY MOUTH TWICE A DAY      Last office visit with prescribing clinician: 12/22/2022   Last telemedicine visit with prescribing clinician: 6/27/2023   Next office visit with prescribing clinician: 6/27/2023                         Would you like a call back once the refill request has been completed: [] Yes [] No    If the office needs to give you a call back, can they leave a voicemail: [] Yes [] No    Carie Shaikh, GREY  03/15/23, 08:29 CDT

## 2023-04-03 DIAGNOSIS — E11.59 TYPE 2 DIABETES MELLITUS WITH OTHER CIRCULATORY COMPLICATION, WITHOUT LONG-TERM CURRENT USE OF INSULIN: ICD-10-CM

## 2023-04-03 NOTE — TELEPHONE ENCOUNTER
"    Caller: Rosalba Churchill    Relationship: Self    Best call back number: [phone]    Requested Prescriptions:   Requested Prescriptions     Pending Prescriptions Disp Refills   • Semaglutide,0.25 or 0.5MG/DOS, (Ozempic, 0.25 or 0.5 MG/DOSE,) 2 MG/1.5ML solution pen-injector 1.5 mL 5     Sig: Inject 0.5 mg under the skin into the appropriate area as directed 1 (One) Time Per Week.        Pharmacy where request should be sent: [facility]/PH[facility]ACY #[id number] - [place], [place] - [address] [phone] [facility] [phone]      Last office visit with prescribing clinician: 12/22/2022   Last telemedicine visit with prescribing clinician: 7/5/2023   Next office visit with prescribing clinician: 7/5/2023     Additional details provided by patient: Pt said that the pharmacy claimed that they need a new prescription because \"the way the pens are made have changed\"      She is late on this weeks injection     Does the patient have less than a 3 day supply:  [x] Yes  [] No    Would you like a call back once the refill request has been completed: [] Yes [x] No    If the office needs to give you a call back, can they leave a voicemail: [] Yes [] No    Tootie Mejia Rep   04/03/23 15:36 CDT   "

## 2023-04-04 RX ORDER — SEMAGLUTIDE 1.34 MG/ML
0.5 INJECTION, SOLUTION SUBCUTANEOUS WEEKLY
Qty: 1.5 ML | Refills: 5 | Status: SHIPPED | OUTPATIENT
Start: 2023-04-04

## 2023-07-28 DIAGNOSIS — I10 ESSENTIAL HYPERTENSION: ICD-10-CM

## 2023-07-28 RX ORDER — AMLODIPINE BESYLATE 10 MG/1
TABLET ORAL
Qty: 90 TABLET | Refills: 3 | Status: SHIPPED | OUTPATIENT
Start: 2023-07-28

## 2023-07-28 RX ORDER — LISINOPRIL 20 MG/1
20 TABLET ORAL 2 TIMES DAILY
Qty: 180 TABLET | Refills: 1 | Status: SHIPPED | OUTPATIENT
Start: 2023-07-28

## 2023-07-28 NOTE — TELEPHONE ENCOUNTER
Rx Refill Note  Requested Prescriptions     Pending Prescriptions Disp Refills    amLODIPine (NORVASC) 10 MG tablet [Pharmacy Med Name: AMLODIPINE BESYLATE 10 MG TAB] 90 tablet 3     Sig: TAKE 1 TABLET BY MOUTH EVERY DAY      Last office visit with prescribing clinician: 7/5/2023   Last telemedicine visit with prescribing clinician: Visit date not found   Next office visit with prescribing clinician: 1/8/2024                         Would you like a call back once the refill request has been completed: [] Yes [] No    If the office needs to give you a call back, can they leave a voicemail: [] Yes [] No    GREY Mendoza  07/28/23, 14:51 CDT

## 2023-07-28 NOTE — TELEPHONE ENCOUNTER
Rx Refill Note  Requested Prescriptions     Pending Prescriptions Disp Refills    lisinopril (PRINIVIL,ZESTRIL) 20 MG tablet [Pharmacy Med Name: LISINOPRIL 20 MG TABLET] 180 tablet 1     Sig: TAKE 1 TABLET BY MOUTH TWICE A DAY      Last office visit with prescribing clinician: 7/5/2023   Last telemedicine visit with prescribing clinician: Visit date not found   Next office visit with prescribing clinician: 1/8/2024                         Would you like a call back once the refill request has been completed: [] Yes [] No    If the office needs to give you a call back, can they leave a voicemail: [] Yes [] No    Rolando Junior MA  07/28/23, 15:25 CDT

## 2023-09-17 DIAGNOSIS — E11.59 TYPE 2 DIABETES MELLITUS WITH OTHER CIRCULATORY COMPLICATION, WITHOUT LONG-TERM CURRENT USE OF INSULIN: ICD-10-CM

## 2023-09-18 RX ORDER — SEMAGLUTIDE 0.68 MG/ML
0.5 INJECTION, SOLUTION SUBCUTANEOUS
Qty: 3 ML | Refills: 5 | Status: SHIPPED | OUTPATIENT
Start: 2023-09-18

## 2023-09-18 NOTE — TELEPHONE ENCOUNTER
Rx Refill Note  Requested Prescriptions     Pending Prescriptions Disp Refills    Ozempic, 0.25 or 0.5 MG/DOSE, 2 MG/3ML solution pen-injector [Pharmacy Med Name: OZEMPIC 0.25-0.5 MG/DOSE PEN]  5     Sig: INJECT 0.5 MG UNDER THE SKIN INTO THE APPROPRIATE AREA AS DIRECTED 1 (ONE) TIME PER WEEK.      Last office visit with prescribing clinician: 10/13/2021   Last telemedicine visit with prescribing clinician: Visit date not found   Next office visit with prescribing clinician: Visit date not found                         Would you like a call back once the refill request has been completed: [] Yes [] No    If the office needs to give you a call back, can they leave a voicemail: [] Yes [] No    Rolando Junior MA  09/18/23, 08:32 CDT

## 2023-11-20 ENCOUNTER — TELEPHONE (OUTPATIENT)
Dept: INTERNAL MEDICINE | Facility: CLINIC | Age: 73
End: 2023-11-20

## 2023-11-20 DIAGNOSIS — E11.59 TYPE 2 DIABETES MELLITUS WITH OTHER CIRCULATORY COMPLICATION, WITHOUT LONG-TERM CURRENT USE OF INSULIN: Primary | ICD-10-CM

## 2023-11-20 RX ORDER — DULAGLUTIDE 0.75 MG/.5ML
0.75 INJECTION, SOLUTION SUBCUTANEOUS WEEKLY
Qty: 2 ML | Refills: 3 | Status: SHIPPED | OUTPATIENT
Start: 2023-11-20

## 2023-11-20 RX ORDER — SEMAGLUTIDE 0.68 MG/ML
0.5 INJECTION, SOLUTION SUBCUTANEOUS WEEKLY
Qty: 3 ML | Refills: 5 | Status: SHIPPED | OUTPATIENT
Start: 2023-11-20

## 2023-11-20 NOTE — TELEPHONE ENCOUNTER
Caller: Rosalba Churchill    Relationship: Self    Best call back number: [phone]     What is the best time to reach you: ANYTIME    Who are you requesting to speak with (clinical staff, provider,  specific staff member): CLINICAL    What was the call regarding: UNABLE TO LOCATE ANY OZEMPIC AT PHARMACIES. PATIENT IS DUE TODAY FOR HER INJECTION TODAY    Is it okay if the provider responds through crobohart: NO PLEASE CALL AND LET PATIENT KNOW WHAT TO DO

## 2023-11-20 NOTE — TELEPHONE ENCOUNTER
PATIENT HAS BEEN CALLED, GIVEN MESSAGE AND STATED THAT THE PHARMACY JUST RECEIVED OZEMPIC AND SHE WANTS TO HAVE THE PHARMACY TO HOLD THE TRULICITY UNTIL NEXT MONTH IN CASE THEY CANNOT GET OZEMPIC IN

## 2023-11-20 NOTE — TELEPHONE ENCOUNTER
PATIENT HAS BEEN CALLED, SHE STATED THAT SHE WOULD LIKE TO BE PRESCRIBED TRULICITY.   SHE WOULD LIKE TO GO BACK ON OZEMPIC WHEN IT IS AVAILABLE.

## 2023-11-20 NOTE — TELEPHONE ENCOUNTER
Trulicity has been prescribed.  Looks like it is going to require a prior authorization.  I do not know when the pharmacies will have Ozempic back in stock.

## 2023-11-20 NOTE — TELEPHONE ENCOUNTER
We may need to change to a different medication such as Trulicity.  Please let me know if she is interested in this new medication.  It is in the same drug class as Ozempic and is also a once a week injectable.  Thanks

## 2024-01-04 ENCOUNTER — LAB (OUTPATIENT)
Dept: LAB | Facility: HOSPITAL | Age: 74
End: 2024-01-04
Payer: MEDICARE

## 2024-01-04 DIAGNOSIS — E78.2 MIXED HYPERLIPIDEMIA: ICD-10-CM

## 2024-01-04 DIAGNOSIS — Z86.73 HISTORY OF STROKE: ICD-10-CM

## 2024-01-04 DIAGNOSIS — I10 ESSENTIAL HYPERTENSION: ICD-10-CM

## 2024-01-04 DIAGNOSIS — Z00.00 HEALTHCARE MAINTENANCE: ICD-10-CM

## 2024-01-04 DIAGNOSIS — E11.59 TYPE 2 DIABETES MELLITUS WITH OTHER CIRCULATORY COMPLICATION, WITHOUT LONG-TERM CURRENT USE OF INSULIN: ICD-10-CM

## 2024-01-04 LAB
ALBUMIN SERPL-MCNC: 4.2 G/DL (ref 3.5–5)
ALBUMIN UR-MCNC: 10.1 MG/DL
ALBUMIN/GLOB SERPL: 1.2 G/DL (ref 1.1–2.5)
ALP SERPL-CCNC: 99 U/L (ref 24–120)
ALT SERPL W P-5'-P-CCNC: 30 U/L (ref 0–35)
ANION GAP SERPL CALCULATED.3IONS-SCNC: 7 MMOL/L (ref 4–13)
AST SERPL-CCNC: 27 U/L (ref 7–45)
BILIRUB SERPL-MCNC: 0.5 MG/DL (ref 0.1–1)
BUN SERPL-MCNC: 14 MG/DL (ref 5–21)
BUN/CREAT SERPL: 24.6
CALCIUM SPEC-SCNC: 9.4 MG/DL (ref 8.4–10.4)
CHLORIDE SERPL-SCNC: 103 MMOL/L (ref 98–110)
CHOLEST SERPL-MCNC: 166 MG/DL (ref 130–200)
CO2 SERPL-SCNC: 30 MMOL/L (ref 24–31)
CREAT SERPL-MCNC: 0.57 MG/DL (ref 0.5–1.4)
CREAT UR-MCNC: 217.7 MG/DL
EGFRCR SERPLBLD CKD-EPI 2021: 96.7 ML/MIN/1.73
ERYTHROCYTE [DISTWIDTH] IN BLOOD BY AUTOMATED COUNT: 11.6 % (ref 12.3–15.4)
GLOBULIN UR ELPH-MCNC: 3.6 GM/DL
GLUCOSE SERPL-MCNC: 148 MG/DL (ref 70–100)
HBA1C MFR BLD: 6.7 % (ref 4.8–5.9)
HCT VFR BLD AUTO: 41.2 % (ref 34–46.6)
HDLC SERPL-MCNC: 52 MG/DL
HGB BLD-MCNC: 13 G/DL (ref 12–15.9)
LDLC SERPL CALC-MCNC: 97 MG/DL (ref 0–99)
LDLC/HDLC SERPL: 1.83 {RATIO}
MCH RBC QN AUTO: 27.3 PG (ref 26.6–33)
MCHC RBC AUTO-ENTMCNC: 31.6 G/DL (ref 31.5–35.7)
MCV RBC AUTO: 86.4 FL (ref 79–97)
MICROALBUMIN/CREAT UR: 46.4 MG/G (ref 0–29)
PLATELET # BLD AUTO: 269 10*3/MM3 (ref 140–450)
PMV BLD AUTO: 8.3 FL (ref 6–12)
POTASSIUM SERPL-SCNC: 4.2 MMOL/L (ref 3.5–5.3)
PROT SERPL-MCNC: 7.8 G/DL (ref 6.3–8.7)
RBC # BLD AUTO: 4.77 10*6/MM3 (ref 3.77–5.28)
SODIUM SERPL-SCNC: 140 MMOL/L (ref 135–145)
TRIGL SERPL-MCNC: 94 MG/DL (ref 0–149)
VLDLC SERPL-MCNC: 17 MG/DL (ref 5–40)
WBC NRBC COR # BLD AUTO: 6.4 10*3/MM3 (ref 3.4–10.8)

## 2024-01-04 PROCEDURE — 83036 HEMOGLOBIN GLYCOSYLATED A1C: CPT

## 2024-01-04 PROCEDURE — 85027 COMPLETE CBC AUTOMATED: CPT

## 2024-01-04 PROCEDURE — 80053 COMPREHEN METABOLIC PANEL: CPT

## 2024-01-04 PROCEDURE — 36415 COLL VENOUS BLD VENIPUNCTURE: CPT

## 2024-01-04 PROCEDURE — 82570 ASSAY OF URINE CREATININE: CPT

## 2024-01-04 PROCEDURE — 80061 LIPID PANEL: CPT

## 2024-01-04 PROCEDURE — 82043 UR ALBUMIN QUANTITATIVE: CPT

## 2024-01-08 ENCOUNTER — OFFICE VISIT (OUTPATIENT)
Dept: INTERNAL MEDICINE | Facility: CLINIC | Age: 74
End: 2024-01-08
Payer: MEDICARE

## 2024-01-08 VITALS
HEART RATE: 78 BPM | DIASTOLIC BLOOD PRESSURE: 80 MMHG | WEIGHT: 144 LBS | TEMPERATURE: 97.6 F | BODY MASS INDEX: 25.52 KG/M2 | OXYGEN SATURATION: 98 % | HEIGHT: 63 IN | SYSTOLIC BLOOD PRESSURE: 138 MMHG

## 2024-01-08 DIAGNOSIS — Z23 NEED FOR INFLUENZA VACCINATION: ICD-10-CM

## 2024-01-08 DIAGNOSIS — E11.59 TYPE 2 DIABETES MELLITUS WITH OTHER CIRCULATORY COMPLICATION, WITHOUT LONG-TERM CURRENT USE OF INSULIN: ICD-10-CM

## 2024-01-08 DIAGNOSIS — Z13.820 ENCOUNTER FOR OSTEOPOROSIS SCREENING IN ASYMPTOMATIC POSTMENOPAUSAL PATIENT: ICD-10-CM

## 2024-01-08 DIAGNOSIS — Z78.0 ENCOUNTER FOR OSTEOPOROSIS SCREENING IN ASYMPTOMATIC POSTMENOPAUSAL PATIENT: ICD-10-CM

## 2024-01-08 DIAGNOSIS — E78.2 MIXED HYPERLIPIDEMIA: ICD-10-CM

## 2024-01-08 DIAGNOSIS — Z13.31 DEPRESSION SCREEN: ICD-10-CM

## 2024-01-08 DIAGNOSIS — Z12.31 ENCOUNTER FOR SCREENING MAMMOGRAM FOR MALIGNANT NEOPLASM OF BREAST: ICD-10-CM

## 2024-01-08 DIAGNOSIS — I10 ESSENTIAL HYPERTENSION: ICD-10-CM

## 2024-01-08 DIAGNOSIS — Z00.00 MEDICARE ANNUAL WELLNESS VISIT, SUBSEQUENT: Primary | ICD-10-CM

## 2024-01-08 DIAGNOSIS — Z86.73 HISTORY OF STROKE: ICD-10-CM

## 2024-01-08 RX ORDER — LISINOPRIL 20 MG/1
10 TABLET ORAL DAILY
Start: 2024-01-08

## 2024-01-08 NOTE — PROGRESS NOTES
The ABCs of the Annual Wellness Visit  Subsequent Medicare Wellness Visit    Subjective      Rosalba Churchill is a 73 y.o. female who presents for a Subsequent Medicare Wellness Visit.    No new issues or complaints.   Stopped lisinopril because her blood pressure was good after she lost weight.  She was on lisinopril 20 mg twice a day.  Her average blood pressure at home is around 130-135/70.    She is doing well with Ozempic 0.5 mg weekly without any side effects.  She has side effects to metformin which resolved after stopping medication.    Reviewed her recent blood work.  Her LDL was 97 with a goal of less than 70.  She reports that she ate normally over the [place] holiday.      Urine studies revealed microalbuminuria    The following portions of the patient's history were reviewed and   updated as appropriate: allergies, current medications, past family history, past medical history, past social history, past surgical history, and problem list.    Compared to one year ago, the patient feels her physical   health is the same. Does feel a little weaker.    Compared to one year ago, the patient feels her mental   health is the same.    Recent Hospitalizations:  She was not admitted to the hospital during the last year.       Current Medical Providers:  Patient Care Team:  LENI Lovett MD as PCP - General (Hospitalist)    Outpatient Medications Prior to Visit   Medication Sig Dispense Refill    amLODIPine (NORVASC) 10 MG tablet TAKE 1 TABLET BY MOUTH EVERY DAY 90 tablet 3    aspirin 81 MG EC tablet Take 1 tablet by mouth Daily. 30 tablet 5    atorvastatin (LIPITOR) 40 MG tablet Take 1 tablet by mouth Daily. 90 tablet 3    cyclobenzaprine (FLEXERIL) 5 MG tablet Take 1 tablet by mouth 2 (Two) Times a Day As Needed for Muscle Spasms. 30 tablet 0    Semaglutide,0.25 or 0.5MG/DOS, (Ozempic, 0.25 or 0.5 MG/DOSE,) 2 MG/3ML solution pen-injector Inject 0.5 mg under the skin into the appropriate area as directed  "1 (One) Time Per Week. 3 mL 5    sertraline (ZOLOFT) 100 MG tablet TAKE 1 TABLET BY MOUTH EVERY DAY 90 tablet 2    Dulaglutide (Trulicity) 0.75 MG/0.5ML solution pen-injector Inject 0.75 mg under the skin into the appropriate area as directed 1 (One) Time Per Week. 2 mL 3    lisinopril (PRINIVIL,ZESTRIL) 20 MG tablet Take 1 tablet by mouth 2 (Two) Times a Day. (Patient not taking: Reported on 1/8/2024) 180 tablet 1     No facility-administered medications prior to visit.       No opioid medication identified on active medication list. I have reviewed chart for other potential  high risk medication/s and harmful drug interactions in the elderly.        Aspirin is on active medication list. Aspirin use is indicated based on review of current medical condition/s. Pros and cons of this therapy have been discussed today. Benefits of this medication outweigh potential harm.  Patient has been encouraged to continue taking this medication.  .      Patient Active Problem List   Diagnosis    Essential hypertension    Hyperlipidemia    Type 2 diabetes mellitus with circulatory disorder, without long-term current use of insulin    Stroke    History of poliomyelitis     Advance Care Planning   Advance Care Planning     Advance Directive is not on file.  ACP discussion was held with the patient during this visit. Patient does not have an advance directive, information provided.     Objective    Vitals:    01/08/24 1524   BP: 138/80   BP Location: Left arm   Patient Position: Sitting   Cuff Size: Adult   Pulse: 78   Temp: 97.6 °F (36.4 °C)   TempSrc: Temporal   SpO2: 98%   Weight: 65.3 kg (144 lb)  Comment: 4 lb brace right leg   Height: 160 cm (63\")   Physical exam was unchanged from previous.    Estimated body mass index is 25.51 kg/m² as calculated from the following:    Height as of this encounter: 160 cm (63\").    Weight as of this encounter: 65.3 kg (144 lb).      Does the patient have evidence of cognitive impairment? "   No    Lab Results   Component Value Date    TRIG 94 2024    HDL 52 2024    LDL 97 2024    VLDL 17 2024    HGBA1C 6.7 (H) 2024          HEALTH RISK ASSESSMENT    Smoking Status:  Social History     Tobacco Use   Smoking Status Never   Smokeless Tobacco Never     Alcohol Consumption:  Social History     Substance and Sexual Activity   Alcohol Use No     Fall Risk Screen:    ADA Fall Risk Assessment was completed, and patient is at LOW risk for falls.Assessment completed on:2024    Depression Screenin/8/2024     3:25 PM   PHQ-2/PHQ-9 Depression Screening   Little Interest or Pleasure in Doing Things 0-->not at all   Feeling Down, Depressed or Hopeless 0-->not at all   PHQ-9: Brief Depression Severity Measure Score 0       Health Habits and Functional and Cognitive Screenin/8/2024     3:25 PM   Functional & Cognitive Status   Do you have difficulty preparing food and eating? No   Do you have difficulty bathing yourself, getting dressed or grooming yourself? No   Do you have difficulty using the toilet? No   Do you have difficulty moving around from place to place? No   Do you have trouble with steps or getting out of a bed or a chair? No   Current Diet Well Balanced Diet   Dental Exam Not up to date   Eye Exam Up to date   Exercise (times per week) 5 times per week   Current Exercises Include Walking   Do you need help using the phone?  No   Are you deaf or do you have serious difficulty hearing?  No   Do you need help to go to places out of walking distance? No   Do you need help shopping? No   Do you need help preparing meals?  No   Do you need help with housework?  No   Do you need help with laundry? No   Do you need help taking your medications? No   Do you need help managing money? No   Do you ever drive or ride in a car without wearing a seat belt? No   Have you felt unusual stress, anger or loneliness in the last month? No   Who do you live with? Alone   If  you need help, do you have trouble finding someone available to you? No   Have you been bothered in the last four weeks by sexual problems? No   Do you have difficulty concentrating, remembering or making decisions? No       Age-appropriate Screening Schedule:  Refer to the list below for future screening recommendations based on patient's age, sex and/or medical conditions. Orders for these recommended tests are listed in the plan section. The patient has been provided with a written plan.    Health Maintenance   Topic Date Due    TDAP/TD VACCINES (1 - Tdap) Never done    ZOSTER VACCINE (1 of 2) Never done    DIABETIC FOOT EXAM  07/09/2020    COVID-19 Vaccine (3 - 2023-24 season) 09/01/2023    ANNUAL WELLNESS VISIT  12/22/2023    DXA SCAN  12/22/2023    MAMMOGRAM  01/11/2024    HEMOGLOBIN A1C  07/04/2024    DIABETIC EYE EXAM  10/24/2024    BMI FOLLOWUP  11/20/2024    LIPID PANEL  01/04/2025    URINE MICROALBUMIN  01/04/2025    COLORECTAL CANCER SCREENING  01/10/2025    HEPATITIS C SCREENING  Completed    INFLUENZA VACCINE  Completed    Pneumococcal Vaccine 65+  Completed          CMS Preventative Services Quick Reference  Risk Factors Identified During Encounter:    Immunizations Discussed/Encouraged: Influenza, Shingrix, COVID19, and RSV (Respiratory Syncytial Virus)    The above risks/problems have been discussed with the patient.  Pertinent information has been shared with the patient in the After Visit Summary.    Diagnoses and all orders for this visit:    1. Medicare annual wellness visit, subsequent (Primary)    2. Need for influenza vaccination  -     Fluzone High-Dose 65+yrs    3. Depression screen    4. Type 2 diabetes mellitus with other circulatory complication, without long-term current use of insulin    5. Essential hypertension  -     lisinopril (PRINIVIL,ZESTRIL) 20 MG tablet; Take 0.5 tablets by mouth Daily.    6. History of stroke    7. Mixed hyperlipidemia    8. Encounter for osteoporosis  screening in asymptomatic postmenopausal patient  -     DEXA Bone Density Axial    9. Encounter for screening mammogram for malignant neoplasm of breast  -     Mammo screening digital tomosynthesis bilateral w CAD; Future      Restart lisinopril at 10 mg daily for its antihypertensive effect and renal protection in setting of type 2 diabetes.    For now, continue current dose of Lipitor.  Focus on diet as the last 3 months her diet has not been as well-controlled due to the holidays.  Recheck lipid panel in 6 months.    Influenza vaccine today.  She she has not gotten the Shingrix yet.  Not interested in COVID-19 vaccine at this time.  She will consider getting RSV vaccine    Follow Up:   6 month recheck A1c and Medicare Wellness visit to be scheduled in 1 year.      An After Visit Summary and PPPS were made available to the patient.    LENI Lovett MD  01/08/2024  Electronically signed by LENI Lovett MD  01/09/24, 7:45 AM CST

## 2024-01-18 ENCOUNTER — HOSPITAL ENCOUNTER (OUTPATIENT)
Dept: MAMMOGRAPHY | Facility: HOSPITAL | Age: 74
Discharge: HOME | End: 2024-01-18
Payer: MEDICARE

## 2024-01-18 ENCOUNTER — HOSPITAL ENCOUNTER (OUTPATIENT)
Dept: BONE DENSITY | Facility: HOSPITAL | Age: 74
Discharge: HOME | End: 2024-01-18
Payer: MEDICARE

## 2024-01-18 DIAGNOSIS — Z12.31 ENCOUNTER FOR SCREENING MAMMOGRAM FOR MALIGNANT NEOPLASM OF BREAST: ICD-10-CM

## 2024-01-18 PROCEDURE — 77067 SCR MAMMO BI INCL CAD: CPT

## 2024-01-18 PROCEDURE — 77063 BREAST TOMOSYNTHESIS BI: CPT

## 2024-01-18 PROCEDURE — 77080 DXA BONE DENSITY AXIAL: CPT

## 2024-01-31 ENCOUNTER — TELEPHONE (OUTPATIENT)
Dept: VASCULAR SURGERY | Facility: CLINIC | Age: 74
End: 2024-01-31
Payer: MEDICARE

## 2024-01-31 NOTE — TELEPHONE ENCOUNTER
PATIENT CONFIRMED TESTING AND APPOINTMENT WITH MARVA ON 2/1/24.   
Adequate: hears normal conversation without difficulty

## 2024-02-01 ENCOUNTER — OFFICE VISIT (OUTPATIENT)
Dept: VASCULAR SURGERY | Facility: CLINIC | Age: 74
End: 2024-02-01
Payer: MEDICARE

## 2024-02-01 ENCOUNTER — HOSPITAL ENCOUNTER (OUTPATIENT)
Dept: ULTRASOUND IMAGING | Facility: HOSPITAL | Age: 74
Discharge: HOME | End: 2024-02-01
Admitting: SURGERY
Payer: MEDICARE

## 2024-02-01 VITALS
SYSTOLIC BLOOD PRESSURE: 162 MMHG | WEIGHT: 140 LBS | OXYGEN SATURATION: 98 % | DIASTOLIC BLOOD PRESSURE: 82 MMHG | HEART RATE: 77 BPM | HEIGHT: 63 IN | BODY MASS INDEX: 24.8 KG/M2

## 2024-02-01 DIAGNOSIS — I65.23 BILATERAL CAROTID ARTERY STENOSIS: ICD-10-CM

## 2024-02-01 DIAGNOSIS — E78.2 MIXED HYPERLIPIDEMIA: ICD-10-CM

## 2024-02-01 DIAGNOSIS — I10 ESSENTIAL HYPERTENSION: ICD-10-CM

## 2024-02-01 DIAGNOSIS — I65.23 BILATERAL CAROTID ARTERY STENOSIS: Primary | ICD-10-CM

## 2024-02-01 PROCEDURE — 1160F RVW MEDS BY RX/DR IN RCRD: CPT | Performed by: NURSE PRACTITIONER

## 2024-02-01 PROCEDURE — 3079F DIAST BP 80-89 MM HG: CPT | Performed by: NURSE PRACTITIONER

## 2024-02-01 PROCEDURE — 99214 OFFICE O/P EST MOD 30 MIN: CPT | Performed by: NURSE PRACTITIONER

## 2024-02-01 PROCEDURE — 1159F MED LIST DOCD IN RCRD: CPT | Performed by: NURSE PRACTITIONER

## 2024-02-01 PROCEDURE — 93880 EXTRACRANIAL BILAT STUDY: CPT

## 2024-02-01 PROCEDURE — 3077F SYST BP >= 140 MM HG: CPT | Performed by: NURSE PRACTITIONER

## 2024-02-01 NOTE — PROGRESS NOTES
"2/1/2024     LENI Lovett MD  [address]  [place] [address]      Rosalba Churchill  1950    Chief Complaint   Patient presents with    Follow-up     1 year follow up w/ testing. Last seen 2/16/23. Patient denies any stroke type symptoms. BP is elevated, but states that she has been walking a lot this morning.        Dear LENI Lovett MD     HPI  I had the pleasure of seeing your patient Rosalba Churchill in the office today.    As you recall, Rosalba Churchill is a 73 y.o.  female who you are currently following for routine health maintenance.  She had a previous brainstem stroke.  At that time, she was unsteady and could not speak well.  She has no residual weakness at this time.  She is maintained on aspirin and Lipitor.  She did have noninvasive testing performed today, which I did review in office.      Review of Systems   Constitutional: Negative.    HENT: Negative.     Eyes: Negative.    Respiratory: Negative.     Cardiovascular: Negative.    Gastrointestinal: Negative.    Endocrine: Negative.    Genitourinary: Negative.    Musculoskeletal: Negative.    Skin: Negative.    Allergic/Immunologic: Negative.    Neurological: Negative.    Hematological: Negative.    Psychiatric/Behavioral: Negative.          /82   Pulse 77   Ht 158.8 cm (62.5\")   Wt 63.5 kg (140 lb)   SpO2 98%   BMI 25.20 kg/m²   Physical Exam  Vitals and nursing note reviewed.   Constitutional:       General: She is not in acute distress.     Appearance: Normal appearance. She is well-developed and normal weight. She is not diaphoretic.   HENT:      Head: Normocephalic and atraumatic.   Eyes:      General: No scleral icterus.     Pupils: Pupils are equal, round, and reactive to light.   Neck:      Thyroid: No thyromegaly.      Vascular: No carotid bruit or JVD.   Cardiovascular:      Rate and Rhythm: Normal rate and regular rhythm.      Pulses: Normal pulses.      Heart sounds: Normal heart " sounds and S2 normal. No murmur heard.     No friction rub. No gallop.   Pulmonary:      Effort: Pulmonary effort is normal.      Breath sounds: Normal breath sounds.   Abdominal:      General: Bowel sounds are normal.      Palpations: Abdomen is soft.   Musculoskeletal:         General: Normal range of motion.      Cervical back: Normal range of motion and neck supple.   Skin:     General: Skin is warm and dry.   Neurological:      General: No focal deficit present.      Mental Status: She is alert and oriented to person, place, and time.      Cranial Nerves: No cranial nerve deficit.   Psychiatric:         Mood and Affect: Mood normal.         Behavior: Behavior normal.         Thought Content: Thought content normal.         Judgment: Judgment normal.       Diagnostic Data:  Noninvasive testing including a carotid duplex shows less than 50% carotid stenosis bilaterally with bilateral antegrade vertebral flow.       Patient Active Problem List   Diagnosis    Essential hypertension    Hyperlipidemia    Type 2 diabetes mellitus with circulatory disorder, without long-term current use of insulin    Stroke    History of poliomyelitis        Diagnosis Plan   1. Bilateral carotid artery stenosis  US Carotid Bilateral      2. Essential hypertension        3. Mixed hyperlipidemia              Plan: After thoroughly evaluating Rosalba Churchill, I believe the best course of action is to remain conservative from vascular surgery standpoint.  Currently she is doing well and denies any strokelike symptoms.  I did review her testing which shows less than 50% carotid stenosis bilaterally.  We will see her back in 1 year with repeat noninvasive testing including carotid duplex for continued surveillance.  I did discuss vascular risk factors as they pertain to the progression of vascular disease including controlling her hypertension hyperlipidemia and diabetes.  Her blood pressure is elevated at 162/82.  She should continue on  her aspirin 81 mg daily and Lipitor 40 mg daily in addition to her other medications.  If blood pressure continues to be elevated she should contact her primary care provider.  Her diabetes is well-controlled with most recent hemoglobin A1c of 6.7%.  This was all discussed in full with complete understanding.  Thank you for allowing me to participate in the care of your patient.  Please do not hesitate to call with any questions or concerns.  We will keep you aware of any further encounters with Rosalba Churchill.        Sincerely yours,         JOHN Thomas D Ryan, MD

## 2024-02-01 NOTE — LETTER
"February 1, 2024       No Recipients    Patient: Rosalba Churchill   YOB: 1950   Date of Visit: 2/1/2024     Dear LENI Lovett MD:       Thank you for referring Rosalba Churchill to me for evaluation. Below are the relevant portions of my assessment and plan of care.    If you have questions, please do not hesitate to call me. I look forward to following Rosalba along with you.         Sincerely,        Saksia JOHN Tamayo        CC:   No Recipients    Alejandrabaylee JOHN Kruger  02/01/24 1141  Sign when Signing Visit  2/1/2024     LENI Lovett MD  [address]  [place] [address]      Rosalba Churchill  1950    Chief Complaint   Patient presents with   • Follow-up     1 year follow up w/ testing. Last seen 2/16/23. Patient denies any stroke type symptoms. BP is elevated, but states that she has been walking a lot this morning.        Dear LENI Lovett MD     HPI  I had the pleasure of seeing your patient Rosalba Churchill in the office today.    As you recall, Rosalba Churchill is a 73 y.o.  female who you are currently following for routine health maintenance.  She had a previous brainstem stroke.  At that time, she was unsteady and could not speak well.  She has no residual weakness at this time.  She is maintained on aspirin and Lipitor.  She did have noninvasive testing performed today, which I did review in office.      Review of Systems   Constitutional: Negative.    HENT: Negative.     Eyes: Negative.    Respiratory: Negative.     Cardiovascular: Negative.    Gastrointestinal: Negative.    Endocrine: Negative.    Genitourinary: Negative.    Musculoskeletal: Negative.    Skin: Negative.    Allergic/Immunologic: Negative.    Neurological: Negative.    Hematological: Negative.    Psychiatric/Behavioral: Negative.          /82   Pulse 77   Ht 158.8 cm (62.5\")   Wt 63.5 kg (140 lb)   SpO2 98%   BMI 25.20 kg/m²   Physical Exam  Vitals and " nursing note reviewed.   Constitutional:       General: She is not in acute distress.     Appearance: Normal appearance. She is well-developed and normal weight. She is not diaphoretic.   HENT:      Head: Normocephalic and atraumatic.   Eyes:      General: No scleral icterus.     Pupils: Pupils are equal, round, and reactive to light.   Neck:      Thyroid: No thyromegaly.      Vascular: No carotid bruit or JVD.   Cardiovascular:      Rate and Rhythm: Normal rate and regular rhythm.      Pulses: Normal pulses.      Heart sounds: Normal heart sounds and S2 normal. No murmur heard.     No friction rub. No gallop.   Pulmonary:      Effort: Pulmonary effort is normal.      Breath sounds: Normal breath sounds.   Abdominal:      General: Bowel sounds are normal.      Palpations: Abdomen is soft.   Musculoskeletal:         General: Normal range of motion.      Cervical back: Normal range of motion and neck supple.   Skin:     General: Skin is warm and dry.   Neurological:      General: No focal deficit present.      Mental Status: She is alert and oriented to person, place, and time.      Cranial Nerves: No cranial nerve deficit.   Psychiatric:         Mood and Affect: Mood normal.         Behavior: Behavior normal.         Thought Content: Thought content normal.         Judgment: Judgment normal.       Diagnostic Data:  Noninvasive testing including a carotid duplex shows less than 50% carotid stenosis bilaterally with bilateral antegrade vertebral flow.       Patient Active Problem List   Diagnosis   • Essential hypertension   • Hyperlipidemia   • Type 2 diabetes mellitus with circulatory disorder, without long-term current use of insulin   • Stroke   • History of poliomyelitis        Diagnosis Plan   1. Bilateral carotid artery stenosis  US Carotid Bilateral      2. Essential hypertension        3. Mixed hyperlipidemia              Plan: After thoroughly evaluating Rosalba SHELBY Churchill, I believe the best course of action  is to remain conservative from vascular surgery standpoint.  Currently she is doing well and denies any strokelike symptoms.  I did review her testing which shows less than 50% carotid stenosis bilaterally.  We will see her back in 1 year with repeat noninvasive testing including carotid duplex for continued surveillance.  I did discuss vascular risk factors as they pertain to the progression of vascular disease including controlling her hypertension hyperlipidemia and diabetes.  Her blood pressure is elevated at 162/82.  She should continue on her aspirin 81 mg daily and Lipitor 40 mg daily in addition to her other medications.  If blood pressure continues to be elevated she should contact her primary care provider.  Her diabetes is well-controlled with most recent hemoglobin A1c of 6.7%.  This was all discussed in full with complete understanding.  Thank you for allowing me to participate in the care of your patient.  Please do not hesitate to call with any questions or concerns.  We will keep you aware of any further encounters with Rosalba Churchill.        Sincerely yours,         JOHN Thomas D Ryan, MD

## 2024-03-12 DIAGNOSIS — E78.2 MIXED HYPERLIPIDEMIA: ICD-10-CM

## 2024-03-12 RX ORDER — ATORVASTATIN CALCIUM 40 MG/1
40 TABLET, FILM COATED ORAL DAILY
Qty: 90 TABLET | Refills: 3 | Status: SHIPPED | OUTPATIENT
Start: 2024-03-12

## 2024-03-12 NOTE — TELEPHONE ENCOUNTER
Rx Refill Note  Requested Prescriptions     Pending Prescriptions Disp Refills    atorvastatin (LIPITOR) 40 MG tablet [Pharmacy Med Name: ATORVASTATIN 40 MG TABLET] 90 tablet 3     Sig: TAKE 1 TABLET BY MOUTH EVERY DAY      Last office visit with prescribing clinician: 1/8/2024   Last telemedicine visit with prescribing clinician: Visit date not found   Next office visit with prescribing clinician: 7/8/2024                         Would you like a call back once the refill request has been completed: [] Yes [] No    If the office needs to give you a call back, can they leave a voicemail: [] Yes [] No    Rolando Junior MA  03/12/24, 08:25 CDT

## 2024-04-22 RX ORDER — SERTRALINE HYDROCHLORIDE 100 MG/1
100 TABLET, FILM COATED ORAL DAILY
Qty: 90 TABLET | Refills: 3 | Status: SHIPPED | OUTPATIENT
Start: 2024-04-22

## 2024-04-22 NOTE — TELEPHONE ENCOUNTER
Rx Refill Note  Requested Prescriptions     Pending Prescriptions Disp Refills    sertraline (ZOLOFT) 100 MG tablet [Pharmacy Med Name: SERTRALINE  MG TABLET] 90 tablet 2     Sig: TAKE 1 TABLET BY MOUTH EVERY DAY      Last office visit with prescribing clinician: 1/8/2024   Last telemedicine visit with prescribing clinician: Visit date not found   Next office visit with prescribing clinician: 7/8/2024                         Would you like a call back once the refill request has been completed: [] Yes [] No    If the office needs to give you a call back, can they leave a voicemail: [] Yes [] No    Rolando Junior MA  04/22/24, 09:18 CDT

## 2024-05-22 DIAGNOSIS — E11.59 TYPE 2 DIABETES MELLITUS WITH OTHER CIRCULATORY COMPLICATION, WITHOUT LONG-TERM CURRENT USE OF INSULIN: ICD-10-CM

## 2024-05-22 RX ORDER — SEMAGLUTIDE 0.68 MG/ML
0.5 INJECTION, SOLUTION SUBCUTANEOUS WEEKLY
Qty: 3 ML | Refills: 5 | Status: SHIPPED | OUTPATIENT
Start: 2024-05-22

## 2024-05-22 NOTE — TELEPHONE ENCOUNTER
Rx Refill Note  Requested Prescriptions     Pending Prescriptions Disp Refills    Ozempic, 0.25 or 0.5 MG/DOSE, 2 MG/3ML solution pen-injector [Pharmacy Med Name: OZEMPIC 0.25-0.5 MG/DOSE PEN]  5     Sig: INJECT 0.5 MG UNDER THE SKIN INTO THE APPROPRIATE AREA AS DIRECTED 1 (ONE) TIME PER WEEK.      Last office visit with prescribing clinician: 1/8/2024   Last telemedicine visit with prescribing clinician: Visit date not found   Next office visit with prescribing clinician: 7/8/2024                         Would you like a call back once the refill request has been completed: [] Yes [] No    If the office needs to give you a call back, can they leave a voicemail: [] Yes [] No    Rolando Junior MA  05/22/24, 08:39 CDT

## 2024-07-08 ENCOUNTER — OFFICE VISIT (OUTPATIENT)
Dept: INTERNAL MEDICINE | Facility: CLINIC | Age: 74
End: 2024-07-08
Payer: MEDICARE

## 2024-07-08 VITALS
HEART RATE: 72 BPM | TEMPERATURE: 97.4 F | OXYGEN SATURATION: 98 % | HEIGHT: 63 IN | DIASTOLIC BLOOD PRESSURE: 70 MMHG | WEIGHT: 137 LBS | SYSTOLIC BLOOD PRESSURE: 140 MMHG | BODY MASS INDEX: 24.27 KG/M2

## 2024-07-08 DIAGNOSIS — E11.59 TYPE 2 DIABETES MELLITUS WITH OTHER CIRCULATORY COMPLICATION, WITHOUT LONG-TERM CURRENT USE OF INSULIN: Primary | ICD-10-CM

## 2024-07-08 DIAGNOSIS — I10 ESSENTIAL HYPERTENSION: ICD-10-CM

## 2024-07-08 DIAGNOSIS — K62.5 BRIGHT RED BLOOD PER RECTUM: ICD-10-CM

## 2024-07-08 DIAGNOSIS — Z00.00 HEALTHCARE MAINTENANCE: ICD-10-CM

## 2024-07-08 DIAGNOSIS — M79.645 PAIN OF LEFT THUMB: ICD-10-CM

## 2024-07-08 DIAGNOSIS — Z86.73 HISTORY OF STROKE: ICD-10-CM

## 2024-07-08 LAB
EXPIRATION DATE: ABNORMAL
HBA1C MFR BLD: 6.5 % (ref 4.5–5.7)
Lab: ABNORMAL

## 2024-07-08 PROCEDURE — 3077F SYST BP >= 140 MM HG: CPT | Performed by: INTERNAL MEDICINE

## 2024-07-08 PROCEDURE — 3078F DIAST BP <80 MM HG: CPT | Performed by: INTERNAL MEDICINE

## 2024-07-08 PROCEDURE — 3044F HG A1C LEVEL LT 7.0%: CPT | Performed by: INTERNAL MEDICINE

## 2024-07-08 PROCEDURE — G2211 COMPLEX E/M VISIT ADD ON: HCPCS | Performed by: INTERNAL MEDICINE

## 2024-07-08 PROCEDURE — 83036 HEMOGLOBIN GLYCOSYLATED A1C: CPT | Performed by: INTERNAL MEDICINE

## 2024-07-08 PROCEDURE — 99214 OFFICE O/P EST MOD 30 MIN: CPT | Performed by: INTERNAL MEDICINE

## 2024-07-08 NOTE — PROGRESS NOTES
Chief Complaint   Patient presents with    Diabetes     Follow up: A1c=6.5         History:  Rosalba Churchill is a 74 y.o. female who presents today for evaluation of the above problems.      HPI    Rosalba presents today for 6-month follow-up.  Overall, she is doing well.    She has left thumb pain for the last 22 weeks but is feeling better.  She is also had some problems with left plantar fasciitis, but is not too bothersome.    She states that 2 or 3 months ago she had an episode of bright red blood per rectum for a few days that spontaneously resolved.  This happened to her previously in [place] she states that they could not find anything abnormal.  She has had a Cologuard within the last 3 years which was negative.      Her A1c today has improved to 6.5%.  She is maintained on Ozempic 0.5 mg weekly.  Significant side effect to metformin in the past.    ROS:  Review of Systems    As above      Current Outpatient Medications:     amLODIPine (NORVASC) 10 MG tablet, TAKE 1 TABLET BY MOUTH EVERY DAY, Disp: 90 tablet, Rfl: 3    aspirin 81 MG EC tablet, Take 1 tablet by mouth Daily., Disp: 30 tablet, Rfl: 5    atorvastatin (LIPITOR) 40 MG tablet, Take 1 tablet by mouth Daily., Disp: 90 tablet, Rfl: 3    cyclobenzaprine (FLEXERIL) 5 MG tablet, Take 1 tablet by mouth 2 (Two) Times a Day As Needed for Muscle Spasms., Disp: 30 tablet, Rfl: 0    lisinopril (PRINIVIL,ZESTRIL) 20 MG tablet, Take 0.5 tablets by mouth Daily., Disp: , Rfl:     Semaglutide,0.25 or 0.5MG/DOS, (Ozempic, 0.25 or 0.5 MG/DOSE,) 2 MG/3ML solution pen-injector, Inject 0.5 mg under the skin into the appropriate area as directed 1 (One) Time Per Week., Disp: 3 mL, Rfl: 5    sertraline (ZOLOFT) 100 MG tablet, Take 1 tablet by mouth Daily., Disp: 90 tablet, Rfl: 3    Lab Results   Component Value Date    GLUCOSE 148 (H) 01/04/2024    BUN 14 01/04/2024    CREATININE 0.57 01/04/2024    EGFR 96.7 01/04/2024    BCR 24.6 01/04/2024    K 4.2 01/04/2024     "CO2 30.0 01/04/2024    CALCIUM 9.4 01/04/2024    ALBUMIN 4.2 01/04/2024    BILITOT 0.5 01/04/2024    AST 27 01/04/2024    ALT 30 01/04/2024       WBC   Date Value Ref Range Status   01/04/2024 6.40 3.40 - 10.80 10*3/mm3 Final     RBC   Date Value Ref Range Status   01/04/2024 4.77 3.77 - 5.28 10*6/mm3 Final     Hemoglobin   Date Value Ref Range Status   01/04/2024 13.0 12.0 - 15.9 g/dL Final     Hematocrit   Date Value Ref Range Status   01/04/2024 41.2 34.0 - 46.6 % Final     MCV   Date Value Ref Range Status   01/04/2024 86.4 79.0 - 97.0 fL Final     MCH   Date Value Ref Range Status   01/04/2024 27.3 26.6 - 33.0 pg Final     MCHC   Date Value Ref Range Status   01/04/2024 31.6 31.5 - 35.7 g/dL Final     RDW   Date Value Ref Range Status   01/04/2024 11.6 (L) 12.3 - 15.4 % Final     RDW-SD   Date Value Ref Range Status   09/19/2022 38.5 37.0 - 54.0 fl Final     MPV   Date Value Ref Range Status   01/04/2024 8.3 6.0 - 12.0 fL Final     Platelets   Date Value Ref Range Status   01/04/2024 269 140 - 450 10*3/mm3 Final     Neutrophil %   Date Value Ref Range Status   07/23/2020 59.2 42.7 - 76.0 % Final     Lymphocyte %   Date Value Ref Range Status   07/23/2020 31.1 19.6 - 45.3 % Final     Neutrophils, Absolute   Date Value Ref Range Status   07/23/2020 3.90 1.70 - 7.00 10*3/mm3 Final     Lymphocytes, Absolute   Date Value Ref Range Status   07/23/2020 2.00 0.70 - 3.10 10*3/mm3 Final         OBJECTIVE:  Visit Vitals  /70 (BP Location: Left arm, Patient Position: Sitting, Cuff Size: Adult)   Pulse 72   Temp 97.4 °F (36.3 °C) (Temporal)   Ht 158.8 cm (62.5\")   Wt 62.1 kg (137 lb)   SpO2 98%   BMI 24.66 kg/m²      Physical Exam  Vitals and nursing note reviewed.   Constitutional:       General: She is not in acute distress.     Appearance: Normal appearance. She is well-developed. She is not diaphoretic.      Comments: Pleasant   HENT:      Head: Normocephalic and atraumatic.   Eyes:      Pupils: Pupils are equal, " round, and reactive to light.   Neck:      Thyroid: No thyromegaly.      Trachea: Phonation normal.   Cardiovascular:      Rate and Rhythm: Normal rate and regular rhythm.      Heart sounds: No murmur heard.  Pulmonary:      Effort: No respiratory distress.      Breath sounds: No wheezing or rales.   Musculoskeletal:      Right hand: No tenderness. Normal range of motion. Normal strength.      Left hand: Tenderness (First [facility]) present. Normal range of motion. Normal strength.   Skin:     Coloration: Skin is not pale.      Findings: No erythema.   Neurological:      Mental Status: She is alert.   Psychiatric:         Behavior: Behavior normal.         Thought Content: Thought content normal.         Judgment: Judgment normal.         Assessment/Plan      Diagnoses and all orders for this visit:    1. Type 2 diabetes mellitus with other circulatory complication, without long-term current use of insulin (Primary)  -     POC Glycosylated Hemoglobin (Hb A1C)  -     Hemoglobin A1c; Future  -     Lipid Panel; Future  -     Microalbumin / Creatinine Urine Ratio - Urine, Clean Catch; Future    2. Essential hypertension  -     Comprehensive Metabolic Panel; Future    3. Bright red blood per rectum  -     Ambulatory Referral to Gastroenterology    4. Pain of left thumb    5. Healthcare maintenance  -     Comprehensive Metabolic Panel; Future  -     Hemoglobin A1c; Future  -     Lipid Panel; Future  -     CBC (No Diff); Future    6. History of stroke  -     Hemoglobin A1c; Future  -     Lipid Panel; Future      I am pleased that Rosalba's diabetes has gotten better with an A1c of 6.5%.  She is interested in coming off of the medication at some point in the future and we will discuss at her next visit in 6 months.    Referred to gastroenterology for evaluation of her bright red blood per rectum.    We discussed possibility of getting an x-ray for the left thumb but she would like to hold off for now.  Recommend over-the-counter  diclofenac gel to help with her symptoms.    I have ordered blood work to be done in 6 months a few days before her Medicare wellness visit      Return in about 6 months (around 1/8/2025) for Medicare Wellness.      TALIA Lovett MD  14:53 CDT  7/8/2024   Electronically signed

## 2024-07-15 NOTE — H&P (VIEW-ONLY)
"Chief Complaint   Patient presents with    Rectal Bleeding     Bright red rectal bleeding has had 3 colons last one 7-8 years ago no polyps has had polyps       PCP: LENI Lovett MD  REFER: LENI Lovett MD    Subjective     HPI    Rosalba Churchill referred to office for evaluation of bright red blood per rectum.  Bright red blood noted one time \"couple months ago.\"  FRANKLIN burning followed by passing bright red blood.    Bowel habit described as alternating from diarrhea to constipation.  It was not uncommon for her to have diarrhea  in the past.  She will sometimes have fecal urgency.    No weight loss.  Similar symptoms several years ago without specific diagnosis.  She has had colonoscopy many years ago, she thinks polyp removed during first colonoscopy (per patient, no record)        Cologuard within past 3 years - negative per patient.      Past Medical History:   Diagnosis Date    Anxiety     Arthritis     Depression     Heart murmur     Hyperlipidemia     Hyperlipidemia 2019    Hypertension     Kidney stone     Polio     Stroke     Type 2 diabetes mellitus        Past Surgical History:   Procedure Laterality Date    ANKLE FUSION Right 1965    CARDIAC CATHETERIZATION      CERVICAL SPINE SURGERY       SECTION         Outpatient Medications Marked as Taking for the 24 encounter (Office Visit) with Marcell Rebollar APRN   Medication Sig Dispense Refill    amLODIPine (NORVASC) 10 MG tablet TAKE 1 TABLET BY MOUTH EVERY DAY 90 tablet 3    aspirin 81 MG EC tablet Take 1 tablet by mouth Daily. 30 tablet 5    atorvastatin (LIPITOR) 40 MG tablet Take 1 tablet by mouth Daily. 90 tablet 3    cyclobenzaprine (FLEXERIL) 5 MG tablet Take 1 tablet by mouth 2 (Two) Times a Day As Needed for Muscle Spasms. 30 tablet 0    lisinopril (PRINIVIL,ZESTRIL) 20 MG tablet Take 0.5 tablets by mouth Daily.      Semaglutide,0.25 or 0.5MG/DOS, (Ozempic, 0.25 or 0.5 MG/DOSE,) 2 MG/3ML solution " "pen-injector Inject 0.5 mg under the skin into the appropriate area as directed 1 (One) Time Per Week. 3 mL 5    sertraline (ZOLOFT) 100 MG tablet Take 1 tablet by mouth Daily. 90 tablet 3       Allergies   Allergen Reactions    Erythromycin Unknown (See Comments)    Ibuprofen Unknown (See Comments)    Moxifloxacin Unknown (See Comments)       Social History     Socioeconomic History    Marital status: Single   Tobacco Use    Smoking status: Never    Smokeless tobacco: Never   Vaping Use    Vaping status: Never Used   Substance and Sexual Activity    Alcohol use: No    Drug use: No    Sexual activity: Not Currently     Partners: Male       Review of Systems   Constitutional:  Negative for fever and unexpected weight change.   HENT:  Negative for trouble swallowing.    Respiratory:  Negative for shortness of breath.    Cardiovascular:  Negative for chest pain.   Gastrointestinal:  Positive for anal bleeding. Negative for abdominal pain.       Objective     Vitals:    07/16/24 1309   BP: 138/76   Pulse: 69   Temp: 98.6 °F (37 °C)   SpO2: 99%   Weight: 62.6 kg (138 lb)   Height: 160 cm (63\")     Body mass index is 24.45 kg/m².    Physical Exam  Constitutional:       Appearance: Normal appearance. She is well-developed.   Eyes:      General: No scleral icterus.  Cardiovascular:      Heart sounds: Normal heart sounds. No murmur heard.  Pulmonary:      Effort: Pulmonary effort is normal.   Abdominal:      General: Bowel sounds are normal. There is no distension.      Palpations: Abdomen is soft.      Tenderness: There is no abdominal tenderness. There is no guarding.   Skin:     General: Skin is warm and dry.      Coloration: Skin is not jaundiced.   Neurological:      Mental Status: She is alert.   Psychiatric:         Behavior: Behavior is cooperative.         Imaging Results (Most Recent)       None            Body mass index is 24.45 kg/m².    Assessment & Plan     Diagnoses and all orders for this visit:    1. Rectal " bleed (Primary)  -     Case Request; Standing  -     Implement Anesthesia Orders Day of Procedure; Standing  -     Obtain Informed Consent; Standing  -     Case Request    2. Long-term (current) use of injectable non-insulin antidiabetic drugs    3. History of colon polyps    Other orders  -     Clenpiq 10-3.5-12 MG-GM -GM/160ML solution; Take 160 mL by mouth Take As Directed.  Dispense: 320 mL; Refill: 0         COLONOSCOPY WITH ANESTHESIA (N/A)    If procedure is unremarkable will need to ensure adequate bowel movement with fiber, water, miralax as needed     Patient is to continue all blood pressure and cardiac medications prior to procedure and has been advised to take medications morning of procedure   Pt verbalized understanding     Advised pt to stop use of NSAIDs, Fish Oil, and MV 5 days prior to procedure, per Dr Fox protocol.  Tylenol based products are ok to take.  Pt verbalized understanding.    Rosalba Churchill asked to hold Semaglutide (Ozempic, Wegovy, Rybelsus) x 7 days prior to procedure per The American Society of Anesthesia guidelines    All risks, benefits, alternatives, and indications of colonoscopy procedure have been discussed with the patient. Risks to include perforation of the colon requiring possible surgery or colostomy, risk of bleeding from biopsies or removal of colon tissue, possibility of missing a colon polyp or cancer, or adverse drug reaction.  Benefits to include the diagnosis and management of disease of the colon and rectum. Alternatives to include barium enema, radiographic evaluation, lab testing or no intervention. Pt verbalizes understanding and agrees to proceed with procedure.        Marcell Rebollar, APRN  07/16/24          There are no Patient Instructions on file for this visit.

## 2024-07-16 ENCOUNTER — OFFICE VISIT (OUTPATIENT)
Dept: GASTROENTEROLOGY | Facility: CLINIC | Age: 74
End: 2024-07-16
Payer: MEDICARE

## 2024-07-16 VITALS
HEART RATE: 69 BPM | WEIGHT: 138 LBS | HEIGHT: 63 IN | SYSTOLIC BLOOD PRESSURE: 138 MMHG | OXYGEN SATURATION: 99 % | TEMPERATURE: 98.6 F | DIASTOLIC BLOOD PRESSURE: 76 MMHG | BODY MASS INDEX: 24.45 KG/M2

## 2024-07-16 DIAGNOSIS — Z86.010 HISTORY OF COLON POLYPS: ICD-10-CM

## 2024-07-16 DIAGNOSIS — K62.5 RECTAL BLEED: Primary | ICD-10-CM

## 2024-07-16 DIAGNOSIS — Z79.85 LONG-TERM (CURRENT) USE OF INJECTABLE NON-INSULIN ANTIDIABETIC DRUGS: ICD-10-CM

## 2024-07-16 PROCEDURE — 3078F DIAST BP <80 MM HG: CPT | Performed by: NURSE PRACTITIONER

## 2024-07-16 PROCEDURE — 3075F SYST BP GE 130 - 139MM HG: CPT | Performed by: NURSE PRACTITIONER

## 2024-07-16 PROCEDURE — 1159F MED LIST DOCD IN RCRD: CPT | Performed by: NURSE PRACTITIONER

## 2024-07-16 PROCEDURE — 99214 OFFICE O/P EST MOD 30 MIN: CPT | Performed by: NURSE PRACTITIONER

## 2024-07-16 PROCEDURE — 1160F RVW MEDS BY RX/DR IN RCRD: CPT | Performed by: NURSE PRACTITIONER

## 2024-07-16 RX ORDER — SODIUM PICOSULFATE, MAGNESIUM OXIDE, AND ANHYDROUS CITRIC ACID 10; 3.5; 12 MG/160ML; G/160ML; G/160ML
1 LIQUID ORAL TAKE AS DIRECTED
Qty: 320 ML | Refills: 0 | Status: SHIPPED | OUTPATIENT
Start: 2024-07-16

## 2024-07-24 ENCOUNTER — ANESTHESIA (OUTPATIENT)
Dept: GASTROENTEROLOGY | Facility: HOSPITAL | Age: 74
End: 2024-07-24
Payer: MEDICARE

## 2024-07-24 ENCOUNTER — HOSPITAL ENCOUNTER (OUTPATIENT)
Facility: HOSPITAL | Age: 74
Setting detail: HOSPITAL OUTPATIENT SURGERY
Discharge: HOME | End: 2024-07-24
Attending: INTERNAL MEDICINE | Admitting: INTERNAL MEDICINE
Payer: MEDICARE

## 2024-07-24 ENCOUNTER — TELEPHONE (OUTPATIENT)
Dept: GASTROENTEROLOGY | Facility: CLINIC | Age: 74
End: 2024-07-24
Payer: MEDICARE

## 2024-07-24 ENCOUNTER — ANESTHESIA EVENT (OUTPATIENT)
Dept: GASTROENTEROLOGY | Facility: HOSPITAL | Age: 74
End: 2024-07-24
Payer: MEDICARE

## 2024-07-24 VITALS
TEMPERATURE: 97.6 F | BODY MASS INDEX: 23.92 KG/M2 | HEART RATE: 80 BPM | DIASTOLIC BLOOD PRESSURE: 67 MMHG | RESPIRATION RATE: 14 BRPM | HEIGHT: 63 IN | OXYGEN SATURATION: 97 % | WEIGHT: 135 LBS | SYSTOLIC BLOOD PRESSURE: 141 MMHG

## 2024-07-24 DIAGNOSIS — K62.5 RECTAL BLEED: ICD-10-CM

## 2024-07-24 LAB — GLUCOSE BLDC GLUCOMTR-MCNC: 139 MG/DL (ref 70–130)

## 2024-07-24 PROCEDURE — 25810000003 SODIUM CHLORIDE 0.9 % SOLUTION: Performed by: ANESTHESIOLOGY

## 2024-07-24 PROCEDURE — 25010000002 PROPOFOL 10 MG/ML EMULSION: Performed by: NURSE ANESTHETIST, CERTIFIED REGISTERED

## 2024-07-24 PROCEDURE — 45378 DIAGNOSTIC COLONOSCOPY: CPT | Performed by: INTERNAL MEDICINE

## 2024-07-24 PROCEDURE — 82948 REAGENT STRIP/BLOOD GLUCOSE: CPT

## 2024-07-24 RX ORDER — LIDOCAINE HYDROCHLORIDE 20 MG/ML
INJECTION, SOLUTION EPIDURAL; INFILTRATION; INTRACAUDAL; PERINEURAL AS NEEDED
Status: DISCONTINUED | OUTPATIENT
Start: 2024-07-24 | End: 2024-07-24 | Stop reason: SURG

## 2024-07-24 RX ORDER — LIDOCAINE HYDROCHLORIDE 10 MG/ML
0.5 INJECTION, SOLUTION EPIDURAL; INFILTRATION; INTRACAUDAL; PERINEURAL ONCE AS NEEDED
Status: DISCONTINUED | OUTPATIENT
Start: 2024-07-24 | End: 2024-07-24 | Stop reason: HOSPADM

## 2024-07-24 RX ORDER — SODIUM CHLORIDE 0.9 % (FLUSH) 0.9 %
10 SYRINGE (ML) INJECTION AS NEEDED
Status: DISCONTINUED | OUTPATIENT
Start: 2024-07-24 | End: 2024-07-24 | Stop reason: HOSPADM

## 2024-07-24 RX ORDER — SODIUM CHLORIDE 9 MG/ML
500 INJECTION, SOLUTION INTRAVENOUS CONTINUOUS PRN
Status: DISCONTINUED | OUTPATIENT
Start: 2024-07-24 | End: 2024-07-24 | Stop reason: HOSPADM

## 2024-07-24 RX ORDER — PROPOFOL 10 MG/ML
VIAL (ML) INTRAVENOUS AS NEEDED
Status: DISCONTINUED | OUTPATIENT
Start: 2024-07-24 | End: 2024-07-24 | Stop reason: SURG

## 2024-07-24 RX ADMIN — PROPOFOL 300 MG: 10 INJECTION, EMULSION INTRAVENOUS at 08:45

## 2024-07-24 RX ADMIN — LIDOCAINE HYDROCHLORIDE 100 MG: 20 INJECTION, SOLUTION EPIDURAL; INFILTRATION; INTRACAUDAL; PERINEURAL at 08:45

## 2024-07-24 RX ADMIN — SODIUM CHLORIDE 500 ML: 9 INJECTION, SOLUTION INTRAVENOUS at 07:53

## 2024-07-24 NOTE — ANESTHESIA PREPROCEDURE EVALUATION
Anesthesia Evaluation     Patient summary reviewed   no history of anesthetic complications:   NPO Solid Status: > 8 hours             Airway   Mallampati: II  Dental      Pulmonary - negative pulmonary ROS   Cardiovascular   Exercise tolerance: good (4-7 METS)    (+) hypertension, hyperlipidemia      Neuro/Psych  (+) CVA  GI/Hepatic/Renal/Endo    (+) diabetes mellitus    Musculoskeletal     Abdominal    Substance History      OB/GYN          Other                    Anesthesia Plan    ASA 3     MAC       Anesthetic plan, risks, benefits, and alternatives have been provided, discussed and informed consent has been obtained with: patient.    CODE STATUS:

## 2024-07-24 NOTE — ANESTHESIA POSTPROCEDURE EVALUATION
Patient: Rosalba Churchill    Procedure Summary       Date: 07/24/24 Room / Location: [facility] ENDOSCOPY 2 / [facility]    Anesthesia Start: 0843 Anesthesia Stop: 0904    Procedure: COLONOSCOPY WITH ANESTHESIA Diagnosis:       Rectal bleed      (Rectal bleed [K62.5])    Surgeons: Dillon Fox DO Provider: ESTEPHANIA Vera CRNA    Anesthesia Type: MAC ASA Status: 3            Anesthesia Type: MAC    Vitals  Vitals Value Taken Time   /49 07/24/24 0904   Temp     Pulse 69 07/24/24 0904   Resp     SpO2 97 % 07/24/24 0904   Vitals shown include unfiled device data.        Post Anesthesia Care and Evaluation    Patient location during evaluation: PACU  Patient participation: complete - patient participated  Level of consciousness: awake and alert  Pain score: 0  Pain management: adequate    Airway patency: patent  Anesthetic complications: No anesthetic complications    Cardiovascular status: acceptable and stable  Respiratory status: acceptable and unassisted  Hydration status: acceptable

## 2024-09-09 DIAGNOSIS — I10 ESSENTIAL HYPERTENSION: ICD-10-CM

## 2024-09-09 RX ORDER — AMLODIPINE BESYLATE 10 MG/1
10 TABLET ORAL DAILY
Qty: 90 TABLET | Refills: 3 | Status: SHIPPED | OUTPATIENT
Start: 2024-09-09

## 2024-11-01 DIAGNOSIS — E11.59 TYPE 2 DIABETES MELLITUS WITH OTHER CIRCULATORY COMPLICATION, WITHOUT LONG-TERM CURRENT USE OF INSULIN: ICD-10-CM

## 2024-11-01 RX ORDER — SEMAGLUTIDE 0.68 MG/ML
0.5 INJECTION, SOLUTION SUBCUTANEOUS WEEKLY
Qty: 3 ML | Refills: 5 | Status: SHIPPED | OUTPATIENT
Start: 2024-11-01

## 2024-11-01 NOTE — TELEPHONE ENCOUNTER
Rx Refill Note  Requested Prescriptions     Pending Prescriptions Disp Refills    Ozempic, 0.25 or 0.5 MG/DOSE, 2 MG/3ML solution pen-injector [Pharmacy Med Name: OZEMPIC 0.25-0.5 MG/DOSE PEN]  5     Sig: INJECT 0.5 MG UNDER THE SKIN INTO THE APPROPRIATE AREA AS DIRECTED 1 (ONE) TIME PER WEEK.      Last office visit with prescribing clinician: Visit date not found   Last telemedicine visit with prescribing clinician: Visit date not found   Next office visit with prescribing clinician: Visit date not found                         Would you like a call back once the refill request has been completed: [] Yes [] No    If the office needs to give you a call back, can they leave a voicemail: [] Yes [] No    Rolando Junior MA  11/01/24, 11:08 CDTRx Refill Note  Requested Prescriptions     Pending Prescriptions Disp Refills    Ozempic, 0.25 or 0.5 MG/DOSE, 2 MG/3ML solution pen-injector [Pharmacy Med Name: OZEMPIC 0.25-0.5 MG/DOSE PEN]  5     Sig: INJECT 0.5 MG UNDER THE SKIN INTO THE APPROPRIATE AREA AS DIRECTED 1 (ONE) TIME PER WEEK.      Last office visit with prescribing clinician: Visit date not found   Last telemedicine visit with prescribing clinician: Visit date not found   Next office visit with prescribing clinician: Visit date not found                         Would you like a call back once the refill request has been completed: [] Yes [] No    If the office needs to give you a call back, can they leave a voicemail: [] Yes [] No    Rolando Junior MA  11/01/24, 11:08 CDT

## 2025-01-17 ENCOUNTER — LAB (OUTPATIENT)
Dept: LAB | Facility: HOSPITAL | Age: 75
End: 2025-01-17
Payer: MEDICARE

## 2025-01-17 DIAGNOSIS — Z00.00 HEALTHCARE MAINTENANCE: ICD-10-CM

## 2025-01-17 DIAGNOSIS — Z86.73 HISTORY OF STROKE: ICD-10-CM

## 2025-01-17 DIAGNOSIS — I10 ESSENTIAL HYPERTENSION: ICD-10-CM

## 2025-01-17 DIAGNOSIS — E11.59 TYPE 2 DIABETES MELLITUS WITH OTHER CIRCULATORY COMPLICATION, WITHOUT LONG-TERM CURRENT USE OF INSULIN: ICD-10-CM

## 2025-01-17 LAB
ALBUMIN SERPL-MCNC: 4.2 G/DL (ref 3.5–5)
ALBUMIN UR-MCNC: 1.5 MG/DL
ALBUMIN/GLOB SERPL: 1.2 G/DL (ref 1.1–2.5)
ALP SERPL-CCNC: 85 U/L (ref 24–120)
ALT SERPL W P-5'-P-CCNC: 28 U/L (ref 0–35)
ANION GAP SERPL CALCULATED.3IONS-SCNC: 8 MMOL/L (ref 4–13)
AST SERPL-CCNC: 26 U/L (ref 7–45)
BILIRUB SERPL-MCNC: 0.6 MG/DL (ref 0.1–1)
BUN SERPL-MCNC: 17 MG/DL (ref 5–21)
BUN/CREAT SERPL: 34
CALCIUM SPEC-SCNC: 8.9 MG/DL (ref 8.6–10.5)
CHLORIDE SERPL-SCNC: 100 MMOL/L (ref 98–110)
CHOLEST SERPL-MCNC: 153 MG/DL (ref 130–200)
CO2 SERPL-SCNC: 27 MMOL/L (ref 24–31)
CREAT SERPL-MCNC: 0.5 MG/DL (ref 0.5–1.4)
CREAT UR-MCNC: 57 MG/DL
EGFRCR SERPLBLD CKD-EPI 2021: 99.2 ML/MIN/1.73
ERYTHROCYTE [DISTWIDTH] IN BLOOD BY AUTOMATED COUNT: 11.7 % (ref 12.3–15.4)
GLOBULIN UR ELPH-MCNC: 3.4 GM/DL
GLUCOSE SERPL-MCNC: 142 MG/DL (ref 65–99)
HBA1C MFR BLD: 6.6 % (ref 4.8–5.9)
HCT VFR BLD AUTO: 40.7 % (ref 34–46.6)
HDLC SERPL-MCNC: 53 MG/DL
HGB BLD-MCNC: 12.9 G/DL (ref 12–15.9)
LDLC SERPL CALC-MCNC: 84 MG/DL (ref 0–99)
LDLC/HDLC SERPL: 1.56 {RATIO}
MCH RBC QN AUTO: 27.4 PG (ref 26.6–33)
MCHC RBC AUTO-ENTMCNC: 31.7 G/DL (ref 31.5–35.7)
MCV RBC AUTO: 86.4 FL (ref 79–97)
MICROALBUMIN/CREAT UR: 26.3 MG/G (ref 0–29)
PLATELET # BLD AUTO: 227 10*3/MM3 (ref 140–450)
PMV BLD AUTO: 8.5 FL (ref 6–12)
POTASSIUM SERPL-SCNC: 4.2 MMOL/L (ref 3.5–5.3)
PROT SERPL-MCNC: 7.6 G/DL (ref 6.3–8.7)
RBC # BLD AUTO: 4.71 10*6/MM3 (ref 3.77–5.28)
SODIUM SERPL-SCNC: 135 MMOL/L (ref 135–145)
TRIGL SERPL-MCNC: 86 MG/DL (ref 0–149)
VLDLC SERPL-MCNC: 16 MG/DL (ref 5–40)
WBC NRBC COR # BLD AUTO: 5.6 10*3/MM3 (ref 3.4–10.8)

## 2025-01-17 PROCEDURE — 80061 LIPID PANEL: CPT

## 2025-01-17 PROCEDURE — 83036 HEMOGLOBIN GLYCOSYLATED A1C: CPT

## 2025-01-17 PROCEDURE — 85027 COMPLETE CBC AUTOMATED: CPT

## 2025-01-17 PROCEDURE — 80053 COMPREHEN METABOLIC PANEL: CPT

## 2025-01-17 PROCEDURE — 36415 COLL VENOUS BLD VENIPUNCTURE: CPT

## 2025-01-17 PROCEDURE — 82570 ASSAY OF URINE CREATININE: CPT

## 2025-01-17 PROCEDURE — 82043 UR ALBUMIN QUANTITATIVE: CPT

## 2025-01-21 ENCOUNTER — OFFICE VISIT (OUTPATIENT)
Dept: INTERNAL MEDICINE | Facility: CLINIC | Age: 75
End: 2025-01-21
Payer: MEDICARE

## 2025-01-21 VITALS
WEIGHT: 139 LBS | HEART RATE: 80 BPM | OXYGEN SATURATION: 97 % | HEIGHT: 63 IN | SYSTOLIC BLOOD PRESSURE: 136 MMHG | TEMPERATURE: 98.5 F | DIASTOLIC BLOOD PRESSURE: 80 MMHG | BODY MASS INDEX: 24.63 KG/M2

## 2025-01-21 DIAGNOSIS — Z00.00 MEDICARE ANNUAL WELLNESS VISIT, SUBSEQUENT: Primary | ICD-10-CM

## 2025-01-21 DIAGNOSIS — Z91.81 AT MODERATE RISK FOR FALL: ICD-10-CM

## 2025-01-21 DIAGNOSIS — Z12.31 ENCOUNTER FOR SCREENING MAMMOGRAM FOR MALIGNANT NEOPLASM OF BREAST: ICD-10-CM

## 2025-01-21 DIAGNOSIS — E78.2 MIXED HYPERLIPIDEMIA: ICD-10-CM

## 2025-01-21 DIAGNOSIS — E11.59 TYPE 2 DIABETES MELLITUS WITH OTHER CIRCULATORY COMPLICATION, WITHOUT LONG-TERM CURRENT USE OF INSULIN: ICD-10-CM

## 2025-01-21 DIAGNOSIS — R00.2 PALPITATIONS: ICD-10-CM

## 2025-01-21 DIAGNOSIS — I10 ESSENTIAL HYPERTENSION: ICD-10-CM

## 2025-01-21 DIAGNOSIS — Z23 NEED FOR INFLUENZA VACCINATION: ICD-10-CM

## 2025-01-21 DIAGNOSIS — Z86.73 HISTORY OF STROKE: ICD-10-CM

## 2025-01-21 DIAGNOSIS — Z00.00 ENCOUNTER FOR PREVENTIVE CARE: ICD-10-CM

## 2025-01-21 DIAGNOSIS — Z13.31 DEPRESSION SCREEN: ICD-10-CM

## 2025-01-21 PROCEDURE — G0439 PPPS, SUBSEQ VISIT: HCPCS | Performed by: INTERNAL MEDICINE

## 2025-01-21 PROCEDURE — 3075F SYST BP GE 130 - 139MM HG: CPT | Performed by: INTERNAL MEDICINE

## 2025-01-21 PROCEDURE — 1170F FXNL STATUS ASSESSED: CPT | Performed by: INTERNAL MEDICINE

## 2025-01-21 PROCEDURE — G0008 ADMIN INFLUENZA VIRUS VAC: HCPCS | Performed by: INTERNAL MEDICINE

## 2025-01-21 PROCEDURE — 3044F HG A1C LEVEL LT 7.0%: CPT | Performed by: INTERNAL MEDICINE

## 2025-01-21 PROCEDURE — 90662 IIV NO PRSV INCREASED AG IM: CPT | Performed by: INTERNAL MEDICINE

## 2025-01-21 PROCEDURE — 99397 PER PM REEVAL EST PAT 65+ YR: CPT | Performed by: INTERNAL MEDICINE

## 2025-01-21 PROCEDURE — 3079F DIAST BP 80-89 MM HG: CPT | Performed by: INTERNAL MEDICINE

## 2025-01-21 NOTE — PROGRESS NOTES
Subjective   The ABCs of the Annual Wellness Visit  Medicare Wellness Visit      Rosalba Churchill is a 74 y.o. patient who presents for a Medicare Wellness Visit.    The following portions of the patient's history were reviewed and   updated as appropriate: allergies, current medications, past family history, past medical history, past social history, past surgical history, and problem list.    Compared to one year ago, the patient's physical   health is worse. Feels weaker in lower body  Compared to one year ago, the patient's mental   health is the same.    Recent Hospitalizations:  She was not admitted to the hospital during the last year.     Current Medical Providers:  Patient Care Team:  LENI Lovett MD as PCP - General (Hospitalist)    Outpatient Medications Prior to Visit   Medication Sig Dispense Refill    amLODIPine (NORVASC) 10 MG tablet Take 1 tablet by mouth Daily. 90 tablet 3    aspirin 81 MG EC tablet Take 1 tablet by mouth Daily. 30 tablet 5    atorvastatin (LIPITOR) 40 MG tablet Take 1 tablet by mouth Daily. 90 tablet 3    cyclobenzaprine (FLEXERIL) 5 MG tablet Take 1 tablet by mouth 2 (Two) Times a Day As Needed for Muscle Spasms. 30 tablet 0    lisinopril (PRINIVIL,ZESTRIL) 20 MG tablet Take 0.5 tablets by mouth Daily.      Semaglutide,0.25 or 0.5MG/DOS, (Ozempic, 0.25 or 0.5 MG/DOSE,) 2 MG/3ML solution pen-injector Inject 0.5 mg under the skin into the appropriate area as directed 1 (One) Time Per Week. 3 mL 5    sertraline (ZOLOFT) 100 MG tablet Take 1 tablet by mouth Daily. 90 tablet 3     No facility-administered medications prior to visit.     No opioid medication identified on active medication list. I have reviewed chart for other potential  high risk medication/s and harmful drug interactions in the elderly.      Aspirin is on active medication list. Aspirin use is indicated based on review of current medical condition/s. Pros and cons of this therapy have been discussed today.  "Benefits of this medication outweigh potential harm.  Patient has been encouraged to continue taking this medication.  .      Patient Active Problem List   Diagnosis    Essential hypertension    Hyperlipidemia    Type 2 diabetes mellitus with circulatory disorder, without long-term current use of insulin    Stroke    History of poliomyelitis    History of stroke    Rectal bleed     Advance Care Planning Advance Directive is not on file.  ACP discussion was held with the patient during this visit. Patient does not have an advance directive, information provided.            Objective   Vitals:    01/21/25 0947   BP: 136/80   BP Location: Left arm   Patient Position: Sitting   Cuff Size: Adult   Pulse: 80   Temp: 98.5 °F (36.9 °C)   TempSrc: Temporal   SpO2: 97%   Weight: 63 kg (139 lb)   Height: 160 cm (63\")       Estimated body mass index is 24.62 kg/m² as calculated from the following:    Height as of this encounter: 160 cm (63\").    Weight as of this encounter: 63 kg (139 lb).    BMI is within normal parameters. No other follow-up for BMI required.           Does the patient have evidence of cognitive impairment? No  Lab Results   Component Value Date    TRIG 86 01/17/2025    HDL 53 01/17/2025    LDL 84 01/17/2025    VLDL 16 01/17/2025    HGBA1C 6.6 (H) 01/17/2025                                                                                               Health  Risk Assessment    Smoking Status:  Social History     Tobacco Use   Smoking Status Never   Smokeless Tobacco Never     Alcohol Consumption:  Social History     Substance and Sexual Activity   Alcohol Use No       Fall Risk Screen  STEADI Fall Risk Assessment was completed, and patient is at MODERATE risk for falls. Assessment completed on:1/21/2025    Depression Screening   Little interest or pleasure in doing things? Not at all   Feeling down, depressed, or hopeless? Not at all   PHQ-2 Total Score 0      Health Habits and Functional and Cognitive " Screenin/21/2025     9:49 AM   Functional & Cognitive Status   Do you have difficulty preparing food and eating? No   Do you have difficulty bathing yourself, getting dressed or grooming yourself? No   Do you have difficulty using the toilet? No   Do you have difficulty moving around from place to place? No   Do you have trouble with steps or getting out of a bed or a chair? No   Current Diet Well Balanced Diet   Dental Exam Up to date   Eye Exam Up to date   Exercise (times per week) 2 times per week   Current Exercises Include House Cleaning;Walking   Do you need help using the phone?  No   Are you deaf or do you have serious difficulty hearing?  No   Do you need help to go to places out of walking distance? No   Do you need help shopping? No   Do you need help preparing meals?  No   Do you need help with housework?  No   Do you need help with laundry? No   Do you need help taking your medications? No   Do you need help managing money? No   Do you ever drive or ride in a car without wearing a seat belt? No   Have you felt unusual stress, anger or loneliness in the last month? No   Who do you live with? Alone   If you need help, do you have trouble finding someone available to you? No   Have you been bothered in the last four weeks by sexual problems? No   Do you have difficulty concentrating, remembering or making decisions? No           Age-appropriate Screening Schedule:  Refer to the list below for future screening recommendations based on patient's age, sex and/or medical conditions. Orders for these recommended tests are listed in the plan section. The patient has been provided with a written plan.    Health Maintenance List  Health Maintenance   Topic Date Due    TDAP/TD VACCINES (1 - Tdap) Never done    ZOSTER VACCINE (1 of 2) Never done    INFLUENZA VACCINE  2024    COVID-19 Vaccine (3 -  season) 2024    DIABETIC EYE EXAM  10/24/2024    ANNUAL WELLNESS VISIT  2025     MAMMOGRAM  01/18/2025    HEMOGLOBIN A1C  07/17/2025    LIPID PANEL  01/17/2026    DXA SCAN  01/18/2026    COLORECTAL CANCER SCREENING  07/24/2034    HEPATITIS C SCREENING  Completed    Pneumococcal Vaccine 65+  Completed    URINE MICROALBUMIN  Discontinued                                                                                                                                                CMS Preventative Services Quick Reference  Risk Factors Identified During Encounter  Fall Risk-High or Moderate:  Offered physical therapy referral  Immunizations Discussed/Encouraged: Tdap, Influenza, and Shingrix  Polypharmacy: Medication List reviewed    The above risks/problems have been discussed with the patient.  Pertinent information has been shared with the patient in the After Visit Summary.  An After Visit Summary and PPPS were made available to the patient.    Follow Up:   Next Medicare Wellness visit to be scheduled in 1 year.     Assessment & Plan  Medicare annual wellness visit, subsequent         Encounter for preventive care         Need for influenza vaccination    Orders:    Fluzone High-Dose 65+yrs ([id number])    Depression screen         At moderate risk for fall         History of stroke         Type 2 diabetes mellitus with other circulatory complication, without long-term current use of insulin           Essential hypertension           Mixed hyperlipidemia                 Follow Up:   Return in about 6 months (around 7/21/2025) for Recheck A1c.

## 2025-01-21 NOTE — PROGRESS NOTES
Chief Complaint   Patient presents with    Medicare Wellness-subsequent         History:  Rosalba Churchill is a 74 y.o. female who presents today for evaluation of the above problems.      HPI  History of Present Illness  The patient presents for her annual physical and Medicare visit.    She is due for an ophthalmological examination, which is currently a few months overdue. She has not yet established care with a local dentist. She reports no symptoms of depression. She has previously experienced shingles. She is due for a mammogram. She had a bone density test last year. Her daily fluid intake consists of approximately 64 ounces of water and 2 to 3 cups of coffee in the morning.    She experiences palpitations, which are not significantly bothersome and occur approximately once a week. These episodes are often accompanied by dizziness, which she manages by coughing hard. She has declined the use of a monitor at this time.    She reports experiencing back pain on the right side, which started a few days ago. The pain intensifies when she bends over or attempts to roll over in bed. She does not feel the need for an x-ray at this time. She has been managing the pain with Aleve, which provides some relief.    SOCIAL HISTORY  She reports no tobacco use, recreational drug use, or alcohol consumption.      Social History     Socioeconomic History    Marital status: Single   Tobacco Use    Smoking status: Never    Smokeless tobacco: Never   Vaping Use    Vaping status: Never Used   Substance and Sexual Activity    Alcohol use: No    Drug use: No    Sexual activity: Not Currently     Partners: Male       PHQ-9 Depression Screening  Little interest or pleasure in doing things? Not at all   Feeling down, depressed, or hopeless? Not at all   PHQ-2 Total Score 0   Trouble falling or staying asleep, or sleeping too much?     Feeling tired or having little energy?     Poor appetite or overeating?     Feeling bad about yourself  - or that you are a failure or have let yourself or your family down?     Trouble concentrating on things, such as reading the newspaper or watching television?     Moving or speaking so slowly that other people could have noticed? Or the opposite - being so fidgety or restless that you have been moving around a lot more than usual?     Thoughts that you would be better off dead, or of hurting yourself in some way?     PHQ-9 Total Score     If you checked off any problems, how difficult have these problems made it for you to do your work, take care of things at home, or get along with other people? Not difficult at all       PHQ-9 Total Score:      ROS:  Review of Systems   Constitutional:  Negative for activity change, appetite change, fatigue, fever and unexpected weight change.   HENT:  Negative for nosebleeds, sore throat and trouble swallowing.    Eyes: Negative.    Respiratory:  Negative for cough, chest tightness and shortness of breath.    Cardiovascular:  Positive for palpitations (not bothersome). Negative for chest pain and leg swelling.   Gastrointestinal:  Negative for abdominal pain, constipation, diarrhea, nausea and vomiting.   Endocrine: Negative for cold intolerance and heat intolerance.   Genitourinary: Negative.    Musculoskeletal:  Positive for gait problem. Negative for back pain, neck pain and neck stiffness.   Skin:  Negative for rash and wound.   Neurological:  Positive for weakness. Negative for dizziness, syncope, light-headedness and headaches.   Hematological:  Negative for adenopathy. Does not bruise/bleed easily.   Psychiatric/Behavioral:  Negative for agitation, behavioral problems and confusion.          Current Outpatient Medications:     amLODIPine (NORVASC) 10 MG tablet, Take 1 tablet by mouth Daily., Disp: 90 tablet, Rfl: 3    aspirin 81 MG EC tablet, Take 1 tablet by mouth Daily., Disp: 30 tablet, Rfl: 5    atorvastatin (LIPITOR) 40 MG tablet, Take 1 tablet by mouth Daily.,  Disp: 90 tablet, Rfl: 3    cyclobenzaprine (FLEXERIL) 5 MG tablet, Take 1 tablet by mouth 2 (Two) Times a Day As Needed for Muscle Spasms., Disp: 30 tablet, Rfl: 0    lisinopril (PRINIVIL,ZESTRIL) 20 MG tablet, Take 0.5 tablets by mouth Daily., Disp: , Rfl:     Semaglutide,0.25 or 0.5MG/DOS, (Ozempic, 0.25 or 0.5 MG/DOSE,) 2 MG/3ML solution pen-injector, Inject 0.5 mg under the skin into the appropriate area as directed 1 (One) Time Per Week., Disp: 3 mL, Rfl: 5    sertraline (ZOLOFT) 100 MG tablet, Take 1 tablet by mouth Daily., Disp: 90 tablet, Rfl: 3    Lab Results   Component Value Date    GLUCOSE 142 (H) 01/17/2025    BUN 17 01/17/2025    CREATININE 0.50 01/17/2025     01/17/2025    K 4.2 01/17/2025     01/17/2025    CALCIUM 8.9 01/17/2025    PROTEINTOT 7.6 01/17/2025    ALBUMIN 4.2 01/17/2025    ALT 28 01/17/2025    AST 26 01/17/2025    ALKPHOS 85 01/17/2025    BILITOT 0.6 01/17/2025    GLOB 3.4 01/17/2025    AGRATIO 1.2 01/17/2025    BCR 34.0 (H) 01/17/2025    ANIONGAP 8.0 01/17/2025    EGFR 99.2 01/17/2025       WBC   Date Value Ref Range Status   01/17/2025 5.60 3.40 - 10.80 10*3/mm3 Final     RBC   Date Value Ref Range Status   01/17/2025 4.71 3.77 - 5.28 10*6/mm3 Final     Hemoglobin   Date Value Ref Range Status   01/17/2025 12.9 12.0 - 15.9 g/dL Final     Hematocrit   Date Value Ref Range Status   01/17/2025 40.7 34.0 - 46.6 % Final     MCV   Date Value Ref Range Status   01/17/2025 86.4 79.0 - 97.0 fL Final     MCH   Date Value Ref Range Status   01/17/2025 27.4 26.6 - 33.0 pg Final     MCHC   Date Value Ref Range Status   01/17/2025 31.7 31.5 - 35.7 g/dL Final     RDW   Date Value Ref Range Status   01/17/2025 11.7 (L) 12.3 - 15.4 % Final     RDW-SD   Date Value Ref Range Status   09/19/2022 38.5 37.0 - 54.0 fl Final     MPV   Date Value Ref Range Status   01/17/2025 8.5 6.0 - 12.0 fL Final     Platelets   Date Value Ref Range Status   01/17/2025 227 140 - 450 10*3/mm3 Final     Neutrophil  "%   Date Value Ref Range Status   07/23/2020 59.2 42.7 - 76.0 % Final     Lymphocyte %   Date Value Ref Range Status   07/23/2020 31.1 19.6 - 45.3 % Final     Neutrophils, Absolute   Date Value Ref Range Status   07/23/2020 3.90 1.70 - 7.00 10*3/mm3 Final     Lymphocytes, Absolute   Date Value Ref Range Status   07/23/2020 2.00 0.70 - 3.10 10*3/mm3 Final         OBJECTIVE:  Visit Vitals  /80 (BP Location: Left arm, Patient Position: Sitting, Cuff Size: Adult)   Pulse 80   Temp 98.5 °F (36.9 °C) (Temporal)   Ht 160 cm (63\")   Wt 63 kg (139 lb)   SpO2 97%   BMI 24.62 kg/m²      Physical Exam  Vitals and nursing note reviewed.   Constitutional:       General: She is not in acute distress.     Appearance: Normal appearance. She is well-developed. She is not ill-appearing or toxic-appearing.   HENT:      Head: Normocephalic and atraumatic.      Right Ear: Tympanic membrane, ear canal and external ear normal. There is no impacted cerumen.      Left Ear: Tympanic membrane, ear canal and external ear normal. There is no impacted cerumen.   Eyes:      General: No scleral icterus.     Conjunctiva/sclera: Conjunctivae normal.      Pupils: Pupils are equal, round, and reactive to light.   Neck:      Thyroid: No thyromegaly.      Vascular: No carotid bruit or JVD.   Cardiovascular:      Rate and Rhythm: Normal rate and regular rhythm.      Heart sounds: Normal heart sounds. No murmur heard.     No friction rub. No gallop.   Pulmonary:      Effort: Pulmonary effort is normal. No respiratory distress.      Breath sounds: Normal breath sounds. No stridor. No wheezing, rhonchi or rales.   Abdominal:      General: Abdomen is flat. There is no distension.      Palpations: Abdomen is soft.      Tenderness: There is no abdominal tenderness.   Musculoskeletal:        Back:       Right lower leg: No edema.      Left lower leg: No edema.   Skin:     General: Skin is warm and dry.      Coloration: Skin is not jaundiced. "   Neurological:      Mental Status: She is alert.      Cranial Nerves: No cranial nerve deficit.   Psychiatric:         Mood and Affect: Mood normal.         Behavior: Behavior normal.         Thought Content: Thought content normal.         Judgment: Judgment normal.     Physical Exam  Lungs were auscultated.  Heart was examined.    Results  Laboratory Studies  RDW is a little low. Hemoglobin is normal. Red blood cell count is 4.71, which is normal. Cholesterol levels are perfect. A1c is 6.6. BUN to creatinine ratio is 34.    Imaging  Bone density test showed osteopenia, which has slightly worsened compared to 2021.  Assessment/Plan    Diagnoses and all orders for this visit:    1. Medicare annual wellness visit, subsequent (Primary)    2. Encounter for preventive care    3. Need for influenza vaccination  -     Fluzone High-Dose 65+yrs ([id number])    4. Depression screen    5. At moderate risk for fall    6. History of stroke  Assessment & Plan:             7. Type 2 diabetes mellitus with other circulatory complication, without long-term current use of insulin  Assessment & Plan:               8. Essential hypertension  Assessment & Plan:               9. Mixed hyperlipidemia  Assessment & Plan:                10. Palpitations    11. Encounter for screening mammogram for malignant neoplasm of breast  -     Mammo Screening Digital Tomosynthesis Bilateral With CAD; Future      Assessment & Plan  1. Annual physical examination.  Reviewed recent labs with her today in the office.  Her laboratory results are within normal limits. Her hemoglobin levels are within the normal range, indicating no anemia. Her cholesterol levels are optimal, and her A1c is well-controlled at 6.6. However, her BUN to creatinine ratio is elevated at 34, suggesting possible dehydration. Her bone density is slightly reduced, indicating osteopenia, which has shown a slight progression since 2021. She is advised to increase her water intake and  engage in weight-bearing exercises to improve her bone health. She is due for a mammogram, which will be ordered today. She is also eligible for influenza, COVID-19, tetanus, and shingles vaccines, but she declines.    2. Palpitations.  She experiences palpitations approximately once a week, sometimes accompanied by dizziness. She reports that coughing hard seems to alleviate the symptoms. A heart monitor was offered but she declined at this time.    3. Back pain.  She reports right-sided back pain that worsens with bending over or rolling in bed. She is currently taking Aleve, which provides some relief. Given her osteopenia, a compression fracture is a potential concern. An x-ray was offered but she declined at this time.    Counseling/Anticipatory Guidance Discussed: nutrition, physical activity, healthy weight, misuse of tobacco, alcohol and drugs, dental health, mental health, immunizations, and screenings    BMI is within normal parameters. No other follow-up for BMI required.      Return in about 6 months (around 7/21/2025) for Recheck A1c.    Patient was given instructions and counseling regarding his/her condition or for health maintenance advice. Please see specific information pulled into the AVS if appropriate.     TALIA Lovett MD  10:04 CST  1/21/2025  Electronically signed     Patient or patient representative verbalized consent for the use of Ambient Listening during the visit with  LENI Lovett MD for chart documentation. 1/21/2025  10:36 CST

## 2025-01-23 ENCOUNTER — TELEPHONE (OUTPATIENT)
Dept: VASCULAR SURGERY | Facility: CLINIC | Age: 75
End: 2025-01-23
Payer: MEDICARE

## 2025-01-23 NOTE — TELEPHONE ENCOUNTER
SPOKE WITH PT AS A REMINDER OF 0930 ARRIVAL FOR 1000 [facility]ING [facility] THEN TO SEE MARVA AT 1145 ON 01/24/2025

## 2025-01-24 ENCOUNTER — HOSPITAL ENCOUNTER (OUTPATIENT)
Dept: ULTRASOUND IMAGING | Facility: HOSPITAL | Age: 75
Discharge: HOME | End: 2025-01-24
Payer: MEDICARE

## 2025-01-24 ENCOUNTER — OFFICE VISIT (OUTPATIENT)
Dept: VASCULAR SURGERY | Facility: CLINIC | Age: 75
End: 2025-01-24
Payer: MEDICARE

## 2025-01-24 VITALS
WEIGHT: 140 LBS | BODY MASS INDEX: 24.8 KG/M2 | OXYGEN SATURATION: 97 % | DIASTOLIC BLOOD PRESSURE: 78 MMHG | SYSTOLIC BLOOD PRESSURE: 158 MMHG | HEIGHT: 63 IN | HEART RATE: 76 BPM

## 2025-01-24 DIAGNOSIS — I65.23 BILATERAL CAROTID ARTERY STENOSIS: Primary | ICD-10-CM

## 2025-01-24 DIAGNOSIS — I10 ESSENTIAL HYPERTENSION: ICD-10-CM

## 2025-01-24 DIAGNOSIS — I65.23 BILATERAL CAROTID ARTERY STENOSIS: ICD-10-CM

## 2025-01-24 DIAGNOSIS — E78.2 MIXED HYPERLIPIDEMIA: ICD-10-CM

## 2025-01-24 PROCEDURE — 3078F DIAST BP <80 MM HG: CPT | Performed by: NURSE PRACTITIONER

## 2025-01-24 PROCEDURE — 1159F MED LIST DOCD IN RCRD: CPT | Performed by: NURSE PRACTITIONER

## 2025-01-24 PROCEDURE — 99214 OFFICE O/P EST MOD 30 MIN: CPT | Performed by: NURSE PRACTITIONER

## 2025-01-24 PROCEDURE — 1160F RVW MEDS BY RX/DR IN RCRD: CPT | Performed by: NURSE PRACTITIONER

## 2025-01-24 PROCEDURE — 3077F SYST BP >= 140 MM HG: CPT | Performed by: NURSE PRACTITIONER

## 2025-01-24 PROCEDURE — 93880 EXTRACRANIAL BILAT STUDY: CPT

## 2025-01-24 NOTE — PROGRESS NOTES
"1/24/2025     LENI Lovett MD  [address]  [facility] [facility] [address]      Rosalba Churchill  1950    Chief Complaint   Patient presents with    Follow-up     1 year follow up w/ testing. Last seen 2/1/24. Patient denies any stroke type symptoms. Patient does not have med list, verbally listed current meds.       Dear LENI Lovett MD     HPI  I had the pleasure of seeing your patient Rosalba Churchill in the office today.    As you recall, Rosalba Churchill is a 74 y.o.  female who you are currently following for routine health maintenance.  She had a previous brainstem stroke.  At that time, she was unsteady and could not speak well.  She has done well and denies any further strokelike symptoms.  She is maintained on aspirin and Lipitor.  She did have noninvasive testing performed today, which I did review in office.      Review of Systems   Constitutional: Negative.    HENT: Negative.     Eyes: Negative.    Respiratory: Negative.     Cardiovascular: Negative.    Gastrointestinal: Negative.    Endocrine: Negative.    Genitourinary: Negative.    Musculoskeletal: Negative.    Skin: Negative.    Allergic/Immunologic: Negative.    Neurological: Negative.    Hematological: Negative.    Psychiatric/Behavioral: Negative.          /78   Pulse 76   Ht 160 cm (63\")   Wt 63.5 kg (140 lb)   SpO2 97%   BMI 24.80 kg/m²   Physical Exam  Vitals and nursing note reviewed.   Constitutional:       General: She is not in acute distress.     Appearance: Normal appearance. She is well-developed and normal weight. She is not diaphoretic.   HENT:      Head: Normocephalic and atraumatic.   Eyes:      General: No scleral icterus.     Pupils: Pupils are equal, round, and reactive to light.   Neck:      Thyroid: No thyromegaly.      Vascular: No carotid bruit or JVD.   Cardiovascular:      Rate and Rhythm: Normal rate and regular rhythm.      Pulses: Normal pulses.      Heart sounds: Normal " heart sounds and S2 normal. No murmur heard.     No friction rub. No gallop.   Pulmonary:      Effort: Pulmonary effort is normal.      Breath sounds: Normal breath sounds.   Abdominal:      General: Bowel sounds are normal.      Palpations: Abdomen is soft.   Musculoskeletal:         General: Normal range of motion.      Cervical back: Normal range of motion and neck supple.   Skin:     General: Skin is warm and dry.   Neurological:      General: No focal deficit present.      Mental Status: She is alert and oriented to person, place, and time.      Cranial Nerves: No cranial nerve deficit.   Psychiatric:         Mood and Affect: Mood normal.         Behavior: Behavior normal.         Thought Content: Thought content normal.         Judgment: Judgment normal.       Diagnostic Data:  Noninvasive testing including a carotid duplex shows less than 50% carotid stenosis bilaterally with bilateral antegrade vertebral flow.      Patient Active Problem List   Diagnosis    Essential hypertension    Hyperlipidemia    Type 2 diabetes mellitus with circulatory disorder, without long-term current use of insulin    Stroke    History of poliomyelitis    History of stroke    Rectal bleed        Diagnosis Plan   1. Bilateral carotid artery stenosis  US Carotid Bilateral      2. Essential hypertension        3. Mixed hyperlipidemia                Plan: After thoroughly evaluating Rosalba Churchill, I believe the best course of action is to remain conservative from vascular surgery standpoint.  Currently she is doing well and denies any strokelike symptoms.  I did review her testing which shows less than 50% carotid stenosis bilaterally.  We will see her back in 1 year with repeat noninvasive testing including a carotid duplex for continued surveillance.  I did discuss vascular risk factors as they pertain to the progression of vascular disease including controlling her hypertension, hyperlipidemia, and diabetes.  Her blood pressure  is elevated in office at 158/78.  She should continue her aspirin 81 mg daily and Lipitor 40 mg daily in addition to her other medications.   If blood pressure continues to be elevated she should contact her primary care provider.  Her diabetes is well-controlled with most recent hemoglobin A1c of 6.6%.  This was all discussed in full with complete understanding.  Thank you for allowing me to participate in the care of your patient.  Please do not hesitate to call with any questions or concerns.  We will keep you aware of any further encounters with Rosalba Churchill.        Sincerely yours,         JOHN Thomas D Ryan, MD

## 2025-01-24 NOTE — LETTER
"January 24, 2025     LENI Lovett MD  [address]  [address]  [place] [address]    Patient: Rosalba Churchill   YOB: 1950   Date of Visit: 1/24/2025     Dear LENI Lovett MD:       Thank you for referring Rosalba Churchill to me for evaluation. Below are the relevant portions of my assessment and plan of care.    If you have questions, please do not hesitate to call me. I look forward to following Rosalba along with you.         Sincerely,        JOHN Thomas        CC: No Recipients    Saskia Tamayo APRN  01/24/25 1110  Sign when Signing Visit  1/24/2025     LENI Lovett MD  [address]  [facility] [place] [facility] [address]      Rosalba Churchill  1950    Chief Complaint   Patient presents with   • Follow-up     1 year follow up w/ testing. Last seen 2/1/24. Patient denies any stroke type symptoms. Patient does not have med list, verbally listed current meds.       Dear LENI Lovett MD     HPI  I had the pleasure of seeing your patient Rosalba Churchill in the office today.    As you recall, Rosalba Churchill is a 74 y.o.  female who you are currently following for routine health maintenance.  She had a previous brainstem stroke.  At that time, she was unsteady and could not speak well.  She has done well and denies any further strokelike symptoms.  She is maintained on aspirin and Lipitor.  She did have noninvasive testing performed today, which I did review in office.      Review of Systems   Constitutional: Negative.    HENT: Negative.     Eyes: Negative.    Respiratory: Negative.     Cardiovascular: Negative.    Gastrointestinal: Negative.    Endocrine: Negative.    Genitourinary: Negative.    Musculoskeletal: Negative.    Skin: Negative.    Allergic/Immunologic: Negative.    Neurological: Negative.    Hematological: Negative.    Psychiatric/Behavioral: Negative.          /78   Pulse 76   Ht 160 cm (63\")   Wt 63.5 " kg (140 lb)   SpO2 97%   BMI 24.80 kg/m²   Physical Exam  Vitals and nursing note reviewed.   Constitutional:       General: She is not in acute distress.     Appearance: Normal appearance. She is well-developed and normal weight. She is not diaphoretic.   HENT:      Head: Normocephalic and atraumatic.   Eyes:      General: No scleral icterus.     Pupils: Pupils are equal, round, and reactive to light.   Neck:      Thyroid: No thyromegaly.      Vascular: No carotid bruit or JVD.   Cardiovascular:      Rate and Rhythm: Normal rate and regular rhythm.      Pulses: Normal pulses.      Heart sounds: Normal heart sounds and S2 normal. No murmur heard.     No friction rub. No gallop.   Pulmonary:      Effort: Pulmonary effort is normal.      Breath sounds: Normal breath sounds.   Abdominal:      General: Bowel sounds are normal.      Palpations: Abdomen is soft.   Musculoskeletal:         General: Normal range of motion.      Cervical back: Normal range of motion and neck supple.   Skin:     General: Skin is warm and dry.   Neurological:      General: No focal deficit present.      Mental Status: She is alert and oriented to person, place, and time.      Cranial Nerves: No cranial nerve deficit.   Psychiatric:         Mood and Affect: Mood normal.         Behavior: Behavior normal.         Thought Content: Thought content normal.         Judgment: Judgment normal.       Diagnostic Data:  Noninvasive testing including a carotid duplex shows less than 50% carotid stenosis bilaterally with bilateral antegrade vertebral flow.      Patient Active Problem List   Diagnosis   • Essential hypertension   • Hyperlipidemia   • Type 2 diabetes mellitus with circulatory disorder, without long-term current use of insulin   • Stroke   • History of poliomyelitis   • History of stroke   • Rectal bleed        Diagnosis Plan   1. Bilateral carotid artery stenosis  US Carotid Bilateral      2. Essential hypertension        3. Mixed  hyperlipidemia                Plan: After thoroughly evaluating Rosalba Churchill, I believe the best course of action is to remain conservative from vascular surgery standpoint.  Currently she is doing well and denies any strokelike symptoms.  I did review her testing which shows less than 50% carotid stenosis bilaterally.  We will see her back in 1 year with repeat noninvasive testing including a carotid duplex for continued surveillance.  I did discuss vascular risk factors as they pertain to the progression of vascular disease including controlling her hypertension, hyperlipidemia, and diabetes.  Her blood pressure is elevated in office at 158/78.  She should continue her aspirin 81 mg daily and Lipitor 40 mg daily in addition to her other medications.   If blood pressure continues to be elevated she should contact her primary care provider.  Her diabetes is well-controlled with most recent hemoglobin A1c of 6.6%.  This was all discussed in full with complete understanding.  Thank you for allowing me to participate in the care of your patient.  Please do not hesitate to call with any questions or concerns.  We will keep you aware of any further encounters with Rosalba Churchill.        Sincerely yours,         JOHN Thomas D Ryan, MD

## 2025-01-29 LAB
NCCN CRITERIA FLAG: ABNORMAL
TYRER CUZICK SCORE: 5.9

## 2025-01-30 ENCOUNTER — HOSPITAL ENCOUNTER (OUTPATIENT)
Dept: MAMMOGRAPHY | Facility: HOSPITAL | Age: 75
Discharge: HOME | End: 2025-01-30
Admitting: INTERNAL MEDICINE
Payer: MEDICARE

## 2025-01-30 DIAGNOSIS — Z12.31 ENCOUNTER FOR SCREENING MAMMOGRAM FOR MALIGNANT NEOPLASM OF BREAST: ICD-10-CM

## 2025-01-30 PROCEDURE — 77067 SCR MAMMO BI INCL CAD: CPT

## 2025-01-30 PROCEDURE — 77063 BREAST TOMOSYNTHESIS BI: CPT

## 2025-02-06 ENCOUNTER — DOCUMENTATION (OUTPATIENT)
Dept: GENETICS | Facility: HOSPITAL | Age: 75
End: 2025-02-06
Payer: MEDICARE

## 2025-03-04 DIAGNOSIS — E78.2 MIXED HYPERLIPIDEMIA: ICD-10-CM

## 2025-03-04 RX ORDER — ATORVASTATIN CALCIUM 40 MG/1
40 TABLET, FILM COATED ORAL DAILY
Qty: 90 TABLET | Refills: 3 | Status: SHIPPED | OUTPATIENT
Start: 2025-03-04

## 2025-03-04 NOTE — TELEPHONE ENCOUNTER
Rx Refill Note  Requested Prescriptions     Pending Prescriptions Disp Refills    atorvastatin (LIPITOR) 40 MG tablet [Pharmacy Med Name: ATORVASTATIN 40 MG TABLET] 90 tablet 3     Sig: TAKE 1 TABLET BY MOUTH EVERY DAY      Last office visit with prescribing clinician: 1/21/2025   Last telemedicine visit with prescribing clinician: Visit date not found   Next office visit with prescribing clinician: Visit date not found                         Would you like a call back once the refill request has been completed: [] Yes [] No    If the office needs to give you a call back, can they leave a voicemail: [] Yes [] No    Rolando Junior MA  03/04/25, 10:07 CST

## 2025-04-21 RX ORDER — SERTRALINE HYDROCHLORIDE 100 MG/1
100 TABLET, FILM COATED ORAL DAILY
Qty: 90 TABLET | Refills: 3 | Status: SHIPPED | OUTPATIENT
Start: 2025-04-21

## 2025-04-26 DIAGNOSIS — E11.59 TYPE 2 DIABETES MELLITUS WITH OTHER CIRCULATORY COMPLICATION, WITHOUT LONG-TERM CURRENT USE OF INSULIN: ICD-10-CM

## 2025-04-28 RX ORDER — SEMAGLUTIDE 0.68 MG/ML
0.5 INJECTION, SOLUTION SUBCUTANEOUS WEEKLY
Qty: 3 ML | Refills: 11 | Status: SHIPPED | OUTPATIENT
Start: 2025-04-28

## 2025-04-28 NOTE — TELEPHONE ENCOUNTER
Rx Refill Note  Requested Prescriptions     Pending Prescriptions Disp Refills    Ozempic, 0.25 or 0.5 MG/DOSE, 2 MG/3ML solution pen-injector [Pharmacy Med Name: OZEMPIC 0.25-0.5 MG/DOSE PEN]  5     Sig: INJECT 0.5 MG UNDER THE SKIN INTO THE APPROPRIATE AREA AS DIRECTED 1 (ONE) TIME PER WEEK.      Last office visit with prescribing clinician: 1/21/2025   Last telemedicine visit with prescribing clinician: Visit date not found   Next office visit with prescribing clinician: Visit date not found                         Would you like a call back once the refill request has been completed: [] Yes [] No    If the office needs to give you a call back, can they leave a voicemail: [] Yes [] No    GREY Mendoza  04/28/25, 09:25 CDT    Medication last filled 11/01/24, qty 3, 5 refills.

## 2025-06-02 ENCOUNTER — TELEPHONE (OUTPATIENT)
Dept: INTERNAL MEDICINE | Facility: CLINIC | Age: 75
End: 2025-06-02
Payer: MEDICARE

## 2025-06-02 ENCOUNTER — OFFICE VISIT (OUTPATIENT)
Dept: INTERNAL MEDICINE | Facility: CLINIC | Age: 75
End: 2025-06-02
Payer: MEDICARE

## 2025-06-02 VITALS
SYSTOLIC BLOOD PRESSURE: 191 MMHG | HEART RATE: 82 BPM | WEIGHT: 141 LBS | DIASTOLIC BLOOD PRESSURE: 85 MMHG | HEIGHT: 63 IN | BODY MASS INDEX: 24.98 KG/M2 | TEMPERATURE: 97.9 F | OXYGEN SATURATION: 98 % | RESPIRATION RATE: 16 BRPM

## 2025-06-02 DIAGNOSIS — E78.2 MIXED HYPERLIPIDEMIA: ICD-10-CM

## 2025-06-02 DIAGNOSIS — I10 ESSENTIAL HYPERTENSION: ICD-10-CM

## 2025-06-02 DIAGNOSIS — E11.59 TYPE 2 DIABETES MELLITUS WITH OTHER CIRCULATORY COMPLICATION, WITHOUT LONG-TERM CURRENT USE OF INSULIN: ICD-10-CM

## 2025-06-02 DIAGNOSIS — J01.00 ACUTE NON-RECURRENT MAXILLARY SINUSITIS: Primary | ICD-10-CM

## 2025-06-02 PROCEDURE — 1160F RVW MEDS BY RX/DR IN RCRD: CPT | Performed by: INTERNAL MEDICINE

## 2025-06-02 PROCEDURE — 99214 OFFICE O/P EST MOD 30 MIN: CPT | Performed by: INTERNAL MEDICINE

## 2025-06-02 PROCEDURE — 1159F MED LIST DOCD IN RCRD: CPT | Performed by: INTERNAL MEDICINE

## 2025-06-02 PROCEDURE — 3044F HG A1C LEVEL LT 7.0%: CPT | Performed by: INTERNAL MEDICINE

## 2025-06-02 PROCEDURE — 3079F DIAST BP 80-89 MM HG: CPT | Performed by: INTERNAL MEDICINE

## 2025-06-02 PROCEDURE — 3077F SYST BP >= 140 MM HG: CPT | Performed by: INTERNAL MEDICINE

## 2025-06-02 RX ORDER — AMOXICILLIN 875 MG/1
875 TABLET, COATED ORAL 2 TIMES DAILY
Qty: 20 TABLET | Refills: 0 | Status: SHIPPED | OUTPATIENT
Start: 2025-06-02

## 2025-06-02 RX ORDER — FLUTICASONE PROPIONATE 50 MCG
2 SPRAY, SUSPENSION (ML) NASAL DAILY
Qty: 16 G | Refills: 1 | Status: SHIPPED | OUTPATIENT
Start: 2025-06-02

## 2025-06-02 NOTE — PROGRESS NOTES
Subjective     Chief Complaint   Patient presents with    Facial Swelling     Right jaw swelling       Facial Swelling    History of Present Illness  The patient presents for evaluation of severe tooth pain.    She reports experiencing sharp tooth pain, initially thought to be dental-related. Despite undergoing a dental cleaning and additional x-rays a couple of months ago, her dentist ruled out any dental problems. The pain subsided temporarily but has since returned. She describes the pain as very sharp, akin to a knife-like sensation, particularly when pressure is applied to the area. This acute pain onset occurred a few days ago. She also reports a general feeling of malaise but does not believe she has a fever. She has identified a palpable mass at the junction of her cheek and gum, leading her to suspect a possible abscess.     She notes that the pain was initially associated with chewing but has since evolved into a sensation of pressure. She occasionally experiences a tickling sensation in her ears and perceives one side of her face to be more swollen than the other. She reports no cough or nasal discharge but does report facial pain. She is currently on medication for hypertension and Ozempic. She has a history of stroke.      Past Medical History:   Past Medical History:   Diagnosis Date    Anxiety     Arthritis     Depression     Heart murmur     Hyperlipidemia     Hyperlipidemia 2019    Hypertension     Kidney stone     Polio     Stroke     Type 2 diabetes mellitus      Past Surgical History:  Past Surgical History:   Procedure Laterality Date    ANKLE FUSION Right 1965    CARDIAC CATHETERIZATION      CERVICAL SPINE SURGERY       SECTION      COLONOSCOPY      COLONOSCOPY N/A 2024    Procedure: COLONOSCOPY WITH ANESTHESIA;  Surgeon: Dillon Fox DO;  Location: [facility];  Service: Gastroenterology;  Laterality: N/A;  pre op rectal bleed  post normal  pcp Robert  Rachell     Social History:  reports that she has never smoked. She has never used smokeless tobacco. She reports that she does not drink alcohol and does not use drugs.    Family History: family history includes Breast cancer (age of onset: 65) in her mother; Cancer in her mother and sister; Colon cancer in her paternal aunt; Diabetes in her maternal uncle and sister; Heart disease in her father; No Known Problems in her brother, daughter, maternal aunt, maternal grandmother, paternal grandmother, son, and another family member.      Allergies:  Allergies   Allergen Reactions    Erythromycin Unknown (See Comments)    Ibuprofen Unknown (See Comments)    Moxifloxacin Unknown (See Comments)     Medications:  Prior to Admission medications    Medication Sig Start Date End Date Taking? Authorizing Provider   amLODIPine (NORVASC) 10 MG tablet Take 1 tablet by mouth Daily. 9/9/24  Yes LENI Lovett MD   aspirin 81 MG EC tablet Take 1 tablet by mouth Daily. 7/9/19  Yes LENI Lovett MD   atorvastatin (LIPITOR) 40 MG tablet Take 1 tablet by mouth Daily. 3/4/25  Yes LENI Lovett MD   cyclobenzaprine (FLEXERIL) 5 MG tablet Take 1 tablet by mouth 2 (Two) Times a Day As Needed for Muscle Spasms. 12/6/21  Yes LENI Lovett MD   diphenhydrAMINE HCl (BENADRYL ALLERGY PO) Take  by mouth As Needed (alergies and sleep).   Yes Provider, MD Rg   Semaglutide,0.25 or 0.5MG/DOS, (Ozempic, 0.25 or 0.5 MG/DOSE,) 2 MG/3ML solution pen-injector INJECT 0.5 MG UNDER THE SKIN INTO THE APPROPRIATE AREA AS DIRECTED 1 (ONE) TIME PER WEEK. 4/28/25  Yes LENI Lovett MD   sertraline (ZOLOFT) 100 MG tablet Take 1 tablet by mouth Daily. 4/21/25  Yes LENI Lovett MD   amoxicillin (AMOXIL) 875 MG tablet Take 1 tablet by mouth 2 (Two) Times a Day. 6/2/25   Gary Parisi MD   fluticasone (FLONASE) 50 MCG/ACT nasal spray Administer 2 sprays into the nostril(s) as directed by provider Daily. 6/2/25   Ailin  "Gary Heller MD   lisinopril (PRINIVIL,ZESTRIL) 20 MG tablet Take 0.5 tablets by mouth Daily.  Patient taking differently: Take 0.5 tablets by mouth Daily. States that she does not take every day, lowers BP too much. 1/8/24   LENI Lovett MD           Review of systems   negative unless otherwise specified above in HPI    Objective     Vital Signs: BP (!) 191/85 (BP Location: Left arm, Patient Position: Sitting, Cuff Size: Other (Comment))   Pulse 82   Temp 97.9 °F (36.6 °C) (Temporal)   Resp 16   Ht 160 cm (63\")   Wt 64 kg (141 lb)   SpO2 98%   BMI 24.98 kg/m²     Physical Exam  Vitals reviewed.   Constitutional:       Appearance: Normal appearance. She is normal weight.   HENT:      Head: Normocephalic and atraumatic.      Jaw: There is normal jaw occlusion.      Right Ear: Tympanic membrane normal.      Left Ear: Tympanic membrane normal.      Nose: Nose normal.      Mouth/Throat:        Comments: Right upper gumline there is an area of exquisitely tender erythema she thinks is a sinus infection that looks to be gum infection  Cardiovascular:      Rate and Rhythm: Normal rate and regular rhythm.      Heart sounds: Normal heart sounds.   Pulmonary:      Effort: Pulmonary effort is normal.      Breath sounds: Normal breath sounds.   Musculoskeletal:      Cervical back: Neck supple.   Neurological:      Mental Status: She is alert.       Physical Exam  Mouth/Throat: Swelling in the gums, with a line of swelling noted.    BMI is within normal parameters. No other follow-up for BMI required.        Results Reviewed:  Glucose   Date Value Ref Range Status   01/17/2025 142 (H) 65 - 99 mg/dL Final     BUN   Date Value Ref Range Status   01/17/2025 17 5 - 21 mg/dL Final     Creatinine   Date Value Ref Range Status   01/17/2025 0.50 0.50 - 1.40 mg/dL Final   02/02/2023 0.50 (L) 0.60 - 1.30 mg/dL Final     Comment:     Serial Number: [id number]:  [id number]     Sodium   Date Value Ref Range Status   01/17/2025 " 135 135 - 145 mmol/L Final     Potassium   Date Value Ref Range Status   01/17/2025 4.2 3.5 - 5.3 mmol/L Final     Chloride   Date Value Ref Range Status   01/17/2025 100 98 - 110 mmol/L Final     CO2   Date Value Ref Range Status   01/17/2025 27.0 24.0 - 31.0 mmol/L Final     Calcium   Date Value Ref Range Status   01/17/2025 8.9 8.6 - 10.5 mg/dL Final     ALT (SGPT)   Date Value Ref Range Status   01/17/2025 28 0 - 35 U/L Final     AST (SGOT)   Date Value Ref Range Status   01/17/2025 26 7 - 45 U/L Final     WBC   Date Value Ref Range Status   01/17/2025 5.60 3.40 - 10.80 10*3/mm3 Final     Hematocrit   Date Value Ref Range Status   01/17/2025 40.7 34.0 - 46.6 % Final     Platelets   Date Value Ref Range Status   01/17/2025 227 140 - 450 10*3/mm3 Final     Total Cholesterol   Date Value Ref Range Status   01/17/2025 153 130 - 200 mg/dL Final     Triglycerides   Date Value Ref Range Status   01/17/2025 86 0 - 149 mg/dL Final     HDL Cholesterol   Date Value Ref Range Status   01/17/2025 53 >=50 mg/dL Final     LDL Cholesterol    Date Value Ref Range Status   01/17/2025 84 0 - 99 mg/dL Final     LDL/HDL Ratio   Date Value Ref Range Status   01/17/2025 1.56  Final     Hemoglobin A1C   Date Value Ref Range Status   01/17/2025 6.6 (H) 4.8 - 5.9 % Final   07/08/2024 6.5 (A) 4.5 - 5.7 % Final   09/19/2022 8.70 (H) 4.80 - 5.60 % Final             Results          Assessment / Plan     Assessment/Plan:   Diagnosis Plan   1. Acute non-recurrent maxillary sinusitis  amoxicillin (AMOXIL) 875 MG tablet    fluticasone (FLONASE) 50 MCG/ACT nasal spray      2. Essential hypertension        3. Mixed hyperlipidemia        4. Type 2 diabetes mellitus with other circulatory complication, without long-term current use of insulin            Assessment & Plan  1. Sinusitis.  - Sharp pain in the sinus area started a couple of days ago.  - Noticeable swelling in the gum line.  - Treatment plan includes amoxicillin 875 mg twice daily for  10 days and Flonase nasal spray, 2 squirts in each nostril once daily.  - If no improvement within 48 to 72 hours, consult the dentist for further evaluation.    2. Gum infection.  - Knot above the gum line.  - Swelling in the gum observed.  - Initial treatment for sinusitis prescribed; further dental evaluation if symptoms do not improve.  - Dentist consultation recommended if pain persists.  #3 hypertension she is quit taking her medicines encouraged her to immediately lisinopril 20 mg, but it looks like she had was complaining it was making her blood pressure too low    Return for Next scheduled follow up. unless patient needs to be seen sooner or acute issues arise.      I have discussed the patient results/orders and and plan/recommendation with them at today's visit.      Signed by:    Dr. Gary Parisi Date: 06/02/25    EMR Dictation/Transcription disclaimer:   Some of this note may be an electronic transcription/translation of spoken language to printed text. The electronic translation of spoken language may permit erroneous, or at times, nonsensical words or phrases to be inadvertently transcribed; Although I have reviewed the note for such errors, some may still exist.      Patient or patient representative verbalized consent for the use of Ambient Listening during the visit with  Gary Parisi MD for chart documentation. 6/2/2025  13:52 CDT

## 2025-06-02 NOTE — TELEPHONE ENCOUNTER
Patient informed, per Dr. Parisi, to be sure she takes both of her blood pressure medications daily (amlodipine and lisinopril) because of her high bp reading in the office today.  Patient stated she has been taking the amlodipine consistently, but not the lisinopril.  Patient stated she does not plan to restart lisinopril unless her bp continues to remain elevated at home.  She said she will just monitor her bp for now and will restart lisinopril if needed.

## 2025-07-21 ENCOUNTER — LAB (OUTPATIENT)
Dept: LAB | Facility: HOSPITAL | Age: 75
End: 2025-07-21
Payer: MEDICARE

## 2025-07-21 ENCOUNTER — OFFICE VISIT (OUTPATIENT)
Dept: INTERNAL MEDICINE | Facility: CLINIC | Age: 75
End: 2025-07-21
Payer: MEDICARE

## 2025-07-21 VITALS
HEIGHT: 63 IN | HEART RATE: 66 BPM | OXYGEN SATURATION: 96 % | BODY MASS INDEX: 24.98 KG/M2 | SYSTOLIC BLOOD PRESSURE: 152 MMHG | DIASTOLIC BLOOD PRESSURE: 64 MMHG

## 2025-07-21 DIAGNOSIS — M62.81 MUSCLE WEAKNESS (GENERALIZED): ICD-10-CM

## 2025-07-21 DIAGNOSIS — E11.59 TYPE 2 DIABETES MELLITUS WITH OTHER CIRCULATORY COMPLICATION, WITHOUT LONG-TERM CURRENT USE OF INSULIN: Primary | ICD-10-CM

## 2025-07-21 LAB
ALBUMIN SERPL-MCNC: 3.9 G/DL (ref 3.5–5)
ALBUMIN/GLOB SERPL: 1.3 G/DL (ref 1.1–2.5)
ALP SERPL-CCNC: 81 U/L (ref 24–120)
ALT SERPL W P-5'-P-CCNC: 23 U/L (ref 0–35)
ANION GAP SERPL CALCULATED.3IONS-SCNC: 6 MMOL/L (ref 4–13)
AST SERPL-CCNC: 26 U/L (ref 7–45)
AUTO MIXED CELLS #: 0.6 10*3/MM3 (ref 0.1–2.6)
AUTO MIXED CELLS %: 10.1 % (ref 0.1–24)
BILIRUB SERPL-MCNC: 0.3 MG/DL (ref 0.1–1)
BUN SERPL-MCNC: 14 MG/DL (ref 5–21)
BUN/CREAT SERPL: 28
CALCIUM SPEC-SCNC: 8.6 MG/DL (ref 8.6–10.5)
CHLORIDE SERPL-SCNC: 103 MMOL/L (ref 98–110)
CO2 SERPL-SCNC: 28 MMOL/L (ref 24–31)
CREAT SERPL-MCNC: 0.5 MG/DL (ref 0.5–1.4)
EGFRCR SERPLBLD CKD-EPI 2021: 98.6 ML/MIN/1.73
ERYTHROCYTE [DISTWIDTH] IN BLOOD BY AUTOMATED COUNT: 11.7 % (ref 12.3–15.4)
ERYTHROCYTE [SEDIMENTATION RATE] IN BLOOD: 11 MM/HR (ref 0–30)
EXPIRATION DATE: ABNORMAL
GLOBULIN UR ELPH-MCNC: 3 GM/DL
GLUCOSE SERPL-MCNC: 115 MG/DL (ref 65–99)
HBA1C MFR BLD: 6.5 % (ref 4.5–5.7)
HCT VFR BLD AUTO: 38.5 % (ref 34–46.6)
HGB BLD-MCNC: 12.5 G/DL (ref 12–15.9)
LYMPHOCYTES # BLD AUTO: 1.6 10*3/MM3 (ref 0.7–3.1)
LYMPHOCYTES NFR BLD AUTO: 27 % (ref 19.6–45.3)
Lab: ABNORMAL
MAGNESIUM SERPL-MCNC: 2 MG/DL (ref 1.6–2.4)
MCH RBC QN AUTO: 28 PG (ref 26.6–33)
MCHC RBC AUTO-ENTMCNC: 32.5 G/DL (ref 31.5–35.7)
MCV RBC AUTO: 86.1 FL (ref 79–97)
NEUTROPHILS NFR BLD AUTO: 3.8 10*3/MM3 (ref 1.7–7)
NEUTROPHILS NFR BLD AUTO: 62.9 % (ref 42.7–76)
PLATELET # BLD AUTO: 226 10*3/MM3 (ref 140–450)
PMV BLD AUTO: 8.6 FL (ref 6–12)
POTASSIUM SERPL-SCNC: 4.1 MMOL/L (ref 3.5–5.3)
PROT SERPL-MCNC: 6.9 G/DL (ref 6.3–8.7)
RBC # BLD AUTO: 4.47 10*6/MM3 (ref 3.77–5.28)
SODIUM SERPL-SCNC: 137 MMOL/L (ref 135–145)
WBC NRBC COR # BLD AUTO: 6 10*3/MM3 (ref 3.4–10.8)

## 2025-07-21 PROCEDURE — 83735 ASSAY OF MAGNESIUM: CPT

## 2025-07-21 PROCEDURE — 82550 ASSAY OF CK (CPK): CPT

## 2025-07-21 PROCEDURE — 86200 CCP ANTIBODY: CPT

## 2025-07-21 PROCEDURE — 85025 COMPLETE CBC W/AUTO DIFF WBC: CPT

## 2025-07-21 PROCEDURE — 3044F HG A1C LEVEL LT 7.0%: CPT

## 2025-07-21 PROCEDURE — 36415 COLL VENOUS BLD VENIPUNCTURE: CPT

## 2025-07-21 PROCEDURE — 3078F DIAST BP <80 MM HG: CPT

## 2025-07-21 PROCEDURE — 86431 RHEUMATOID FACTOR QUANT: CPT

## 2025-07-21 PROCEDURE — 3077F SYST BP >= 140 MM HG: CPT

## 2025-07-21 PROCEDURE — 99214 OFFICE O/P EST MOD 30 MIN: CPT

## 2025-07-21 PROCEDURE — 86140 C-REACTIVE PROTEIN: CPT

## 2025-07-21 PROCEDURE — 83036 HEMOGLOBIN GLYCOSYLATED A1C: CPT

## 2025-07-21 PROCEDURE — 80053 COMPREHEN METABOLIC PANEL: CPT

## 2025-07-21 PROCEDURE — 84443 ASSAY THYROID STIM HORMONE: CPT

## 2025-07-21 PROCEDURE — 85652 RBC SED RATE AUTOMATED: CPT

## 2025-07-21 NOTE — PROGRESS NOTES
Chief Complaint   Patient presents with    Diabetes         History:      Rosalba Churchill is a 75 y.o. female who presents today for evaluation of the above problems.      HPI  History of Present Illness  The patient is a 75-year-old female who presents for a 6-month follow-up.    She reports overall satisfaction with her Ozempic treatment, which she has been on for several years. However, she expresses concern about potential side effects, particularly generalized muscle weakness. She has experienced significant weight loss since starting the medication and struggles with tasks such as opening jars and climbing stairs. She also mentions a history of polio, which has left her with no muscle in the right leg, and notes that her previously strong left leg is now weakening. This has led to difficulty in walking and an increased risk of tripping and falling. She uses a walker in the morning before putting on her brace but is resistant to relying on it or a scooter. She is determined to maintain her current level of mobility and independence. She does not typically experience muscle aches or pains unless she overexerts herself. She has tried physical therapy in the past but did not find it beneficial. She continues to perform household chores and yard work but fears losing her mobility.    She also mentions arthritis in her hand, which causes pain, particularly in her thumbs. She believes her arthritis is worsening but attempts to manage it through exercise and stretching. She occasionally experiences hip pain but attributes most of her discomfort to weakness rather than pain.      ROS:  Review of Systems   Neurological:  Positive for weakness.         Current Outpatient Medications:     amLODIPine (NORVASC) 10 MG tablet, Take 1 tablet by mouth Daily., Disp: 90 tablet, Rfl: 3    aspirin 81 MG EC tablet, Take 1 tablet by mouth Daily., Disp: 30 tablet, Rfl: 5    atorvastatin (LIPITOR) 40 MG tablet, Take 1 tablet by mouth  Daily., Disp: 90 tablet, Rfl: 3    cyclobenzaprine (FLEXERIL) 5 MG tablet, Take 1 tablet by mouth 2 (Two) Times a Day As Needed for Muscle Spasms., Disp: 30 tablet, Rfl: 0    diphenhydrAMINE HCl (BENADRYL ALLERGY PO), Take  by mouth As Needed (alergies and sleep)., Disp: , Rfl:     fluticasone (FLONASE) 50 MCG/ACT nasal spray, Administer 2 sprays into the nostril(s) as directed by provider Daily., Disp: 16 g, Rfl: 1    lisinopril (PRINIVIL,ZESTRIL) 20 MG tablet, Take 0.5 tablets by mouth Daily. (Patient taking differently: Take 0.5 tablets by mouth Daily. States that she does not take every day, lowers BP too much.), Disp: , Rfl:     Semaglutide,0.25 or 0.5MG/DOS, (Ozempic, 0.25 or 0.5 MG/DOSE,) 2 MG/3ML solution pen-injector, INJECT 0.5 MG UNDER THE SKIN INTO THE APPROPRIATE AREA AS DIRECTED 1 (ONE) TIME PER WEEK., Disp: 3 mL, Rfl: 11    sertraline (ZOLOFT) 100 MG tablet, Take 1 tablet by mouth Daily., Disp: 90 tablet, Rfl: 3    amoxicillin (AMOXIL) 875 MG tablet, Take 1 tablet by mouth 2 (Two) Times a Day., Disp: 20 tablet, Rfl: 0    Lab Results   Component Value Date    GLUCOSE 142 (H) 01/17/2025    BUN 17 01/17/2025    CREATININE 0.50 01/17/2025     01/17/2025    K 4.2 01/17/2025     01/17/2025    CALCIUM 8.9 01/17/2025    PROTEINTOT 7.6 01/17/2025    ALBUMIN 4.2 01/17/2025    ALT 28 01/17/2025    AST 26 01/17/2025    ALKPHOS 85 01/17/2025    BILITOT 0.6 01/17/2025    GLOB 3.4 01/17/2025    AGRATIO 1.2 01/17/2025    BCR 34.0 (H) 01/17/2025    ANIONGAP 8.0 01/17/2025    EGFR 99.2 01/17/2025       WBC   Date Value Ref Range Status   07/21/2025 6.00 3.40 - 10.80 10*3/mm3 Final     RBC   Date Value Ref Range Status   07/21/2025 4.47 3.77 - 5.28 10*6/mm3 Final     Hemoglobin   Date Value Ref Range Status   07/21/2025 12.5 12.0 - 15.9 g/dL Final     Hematocrit   Date Value Ref Range Status   07/21/2025 38.5 34.0 - 46.6 % Final     MCV   Date Value Ref Range Status   07/21/2025 86.1 79.0 - 97.0 fL Final  "    MCH   Date Value Ref Range Status   07/21/2025 28.0 26.6 - 33.0 pg Final     MCHC   Date Value Ref Range Status   07/21/2025 32.5 31.5 - 35.7 g/dL Final     RDW   Date Value Ref Range Status   07/21/2025 11.7 (L) 12.3 - 15.4 % Final     RDW-SD   Date Value Ref Range Status   09/19/2022 38.5 37.0 - 54.0 fl Final     MPV   Date Value Ref Range Status   07/21/2025 8.6 6.0 - 12.0 fL Final     Platelets   Date Value Ref Range Status   07/21/2025 226 140 - 450 10*3/mm3 Final     Neutrophil %   Date Value Ref Range Status   07/21/2025 62.9 42.7 - 76.0 % Final     Lymphocyte %   Date Value Ref Range Status   07/21/2025 27.0 19.6 - 45.3 % Final     Neutrophils, Absolute   Date Value Ref Range Status   07/21/2025 3.80 1.70 - 7.00 10*3/mm3 Final     Lymphocytes, Absolute   Date Value Ref Range Status   07/21/2025 1.60 0.70 - 3.10 10*3/mm3 Final         OBJECTIVE:  Visit Vitals  /64 (BP Location: Right arm, Patient Position: Sitting, Cuff Size: Adult)   Pulse 66   Ht 160 cm (63\")   SpO2 96%   BMI 24.98 kg/m²      Physical Exam  Constitutional:       Appearance: Normal appearance.   Cardiovascular:      Rate and Rhythm: Normal rate and regular rhythm.   Pulmonary:      Effort: Pulmonary effort is normal.      Breath sounds: Normal breath sounds.   Musculoskeletal:         General: No tenderness.   Skin:     General: Skin is warm and dry.   Neurological:      Mental Status: She is alert.   Psychiatric:         Mood and Affect: Mood normal.         Behavior: Behavior normal.         Thought Content: Thought content normal.         Judgment: Judgment normal.       Physical Exam  Respiratory: Clear to auscultation, no wheezing, rales or rhonchi    Results  Labs   - A1c: 6.5    Assessment/Plan    Diagnoses and all orders for this visit:    1. Type 2 diabetes mellitus with other circulatory complication, without long-term current use of insulin (Primary)  -     POC Glycosylated Hemoglobin (Hb A1C)    2. Muscle weakness " (generalized)  -     Magnesium; Future  -     Comprehensive Metabolic Panel; Future  -     CK; Future  -     CBC Auto Differential; Future  -     C-reactive Protein; Future  -     Cyclic Citrul Peptide Antibody, IgG / IgA; Future  -     Sedimentation Rate; Future  -     Rheumatoid Factor; Future  -     TSH; Future      Assessment & Plan  1. Generalized weakness.  - Weight has remained stable over the past few months.  - A1c levels have improved from 6.6 to 6.5.  - Discussed that rapid weight loss can sometimes lead to muscle wasting, but this does not seem to be the case here. Arthritis and atorvastatin could be contributing factors.  - Ordered an arthritis panel, metabolic panel to check potassium and magnesium levels, thyroid function, and creatinine kinase to evaluate further. Physical therapy may be considered pending lab results.    2. Arthritis.  - Reports arthritis mainly affecting her thumbs, with occasional hip pain.  - Physical exam findings include bumps from arthritis and pain in the thumbs.  - Discussed that arthritis could be contributing to her symptoms over time.  - Ordered an arthritis panel to further investigate.    Follow-up: She will return in 6 months for her Medicare wellness visit.      Return in about 6 months (around 1/21/2026) for Medicare Wellness.      JOHN Turner  13:07 CDT  7/21/2025   Electronically signed      Patient or patient representative verbalized consent for the use of Ambient Listening during the visit with  JOHN Turner for chart documentation. 7/21/2025  14:18 CDT

## 2025-07-22 LAB
CHROMATIN AB SERPL-ACNC: <10 IU/ML (ref 0–14)
CK SERPL-CCNC: 93 U/L (ref 20–180)
CRP SERPL-MCNC: <0.3 MG/DL (ref 0–0.5)
TSH SERPL DL<=0.05 MIU/L-ACNC: 0.64 UIU/ML (ref 0.27–4.2)

## 2025-07-23 LAB — CCP IGA+IGG SERPL IA-ACNC: 3 UNITS (ref 0–19)

## 2025-07-29 DIAGNOSIS — J01.00 ACUTE NON-RECURRENT MAXILLARY SINUSITIS: ICD-10-CM

## 2025-07-29 RX ORDER — FLUTICASONE PROPIONATE 50 MCG
2 SPRAY, SUSPENSION (ML) NASAL DAILY
Qty: 16 G | Refills: 1 | Status: SHIPPED | OUTPATIENT
Start: 2025-07-29

## 2025-08-27 DIAGNOSIS — I10 ESSENTIAL HYPERTENSION: ICD-10-CM

## 2025-08-27 RX ORDER — AMLODIPINE BESYLATE 10 MG/1
10 TABLET ORAL DAILY
Qty: 90 TABLET | Refills: 3 | Status: SHIPPED | OUTPATIENT
Start: 2025-08-27

## (undated) DEVICE — MASK,OXYGEN,MED CONC,ADLT,7' TUB, UC: Brand: PENDING

## (undated) DEVICE — Device: Brand: DEFENDO AIR/WATER/SUCTION AND BIOPSY VALVE

## (undated) DEVICE — YANKAUER,BULB TIP WITH VENT: Brand: ARGYLE

## (undated) DEVICE — THE CHANNEL CLEANING BRUSH IS A NYLON FLEXI BRUSH ATTACHED TO A FLEXIBLE PLASTIC SHEATH DESIGNED TO SAFELY REMOVE DEBRIS FROM FLEXIBLE ENDOSCOPES.

## (undated) DEVICE — SENSR O2 OXIMAX FNGR A/ 18IN NONSTR